# Patient Record
Sex: FEMALE | Race: WHITE | HISPANIC OR LATINO | Employment: OTHER | URBAN - METROPOLITAN AREA
[De-identification: names, ages, dates, MRNs, and addresses within clinical notes are randomized per-mention and may not be internally consistent; named-entity substitution may affect disease eponyms.]

---

## 2017-02-24 ENCOUNTER — TELEPHONE (OUTPATIENT)
Dept: NEUROLOGY | Facility: CLINIC | Age: 47
End: 2017-02-24

## 2017-02-24 NOTE — TELEPHONE ENCOUNTER
----- Message from Jw Prescott sent at 2/24/2017  8:23 AM CST -----  Contact: Pt  Pt would like call back from nurse    Pt states she has movement issues and would like to establish w/ Dr. Edwards.    Pt can be reached at [phone]

## 2017-03-14 ENCOUNTER — PATIENT MESSAGE (OUTPATIENT)
Dept: ENDOCRINOLOGY | Facility: CLINIC | Age: 47
End: 2017-03-14

## 2017-03-14 DIAGNOSIS — E03.9 HYPOTHYROIDISM, UNSPECIFIED TYPE: Primary | ICD-10-CM

## 2017-03-15 ENCOUNTER — PATIENT MESSAGE (OUTPATIENT)
Dept: ENDOCRINOLOGY | Facility: CLINIC | Age: 47
End: 2017-03-15

## 2017-04-08 ENCOUNTER — PATIENT MESSAGE (OUTPATIENT)
Dept: ENDOCRINOLOGY | Facility: CLINIC | Age: 47
End: 2017-04-08

## 2017-04-11 DIAGNOSIS — E07.9 THYROID DISEASE: ICD-10-CM

## 2017-04-11 RX ORDER — LEVOTHYROXINE SODIUM 88 UG/1
88 TABLET ORAL
Qty: 30 TABLET | Refills: 6 | Status: SHIPPED | OUTPATIENT
Start: 2017-04-11 | End: 2017-04-25 | Stop reason: SDUPTHER

## 2017-04-23 ENCOUNTER — PATIENT MESSAGE (OUTPATIENT)
Dept: NEUROLOGY | Facility: CLINIC | Age: 47
End: 2017-04-23

## 2017-04-23 ENCOUNTER — PATIENT MESSAGE (OUTPATIENT)
Dept: ENDOCRINOLOGY | Facility: CLINIC | Age: 47
End: 2017-04-23

## 2017-04-25 ENCOUNTER — OFFICE VISIT (OUTPATIENT)
Dept: SURGERY | Facility: CLINIC | Age: 47
End: 2017-04-25
Payer: COMMERCIAL

## 2017-04-25 ENCOUNTER — PATIENT MESSAGE (OUTPATIENT)
Dept: SURGERY | Facility: CLINIC | Age: 47
End: 2017-04-25

## 2017-04-25 VITALS
SYSTOLIC BLOOD PRESSURE: 119 MMHG | WEIGHT: 130.5 LBS | DIASTOLIC BLOOD PRESSURE: 65 MMHG | HEIGHT: 63 IN | BODY MASS INDEX: 23.12 KG/M2 | HEART RATE: 87 BPM

## 2017-04-25 DIAGNOSIS — K64.9 HEMORRHOIDS, UNSPECIFIED HEMORRHOID TYPE: Primary | ICD-10-CM

## 2017-04-25 DIAGNOSIS — E07.9 THYROID DISEASE: ICD-10-CM

## 2017-04-25 PROCEDURE — 99203 OFFICE O/P NEW LOW 30 MIN: CPT | Mod: 25,S$GLB,, | Performed by: COLON & RECTAL SURGERY

## 2017-04-25 PROCEDURE — 1160F RVW MEDS BY RX/DR IN RCRD: CPT | Mod: S$GLB,,, | Performed by: COLON & RECTAL SURGERY

## 2017-04-25 PROCEDURE — 46600 DIAGNOSTIC ANOSCOPY SPX: CPT | Mod: S$GLB,,, | Performed by: COLON & RECTAL SURGERY

## 2017-04-25 PROCEDURE — 99999 PR PBB SHADOW E&M-EST. PATIENT-LVL III: CPT | Mod: PBBFAC,,, | Performed by: COLON & RECTAL SURGERY

## 2017-04-25 RX ORDER — LEVOTHYROXINE SODIUM 88 UG/1
88 TABLET ORAL
Qty: 90 TABLET | Refills: 3 | Status: SHIPPED | OUTPATIENT
Start: 2017-04-25 | End: 2018-03-02

## 2017-04-25 RX ORDER — LIOTHYRONINE SODIUM 5 UG/1
5 TABLET ORAL 2 TIMES DAILY
Refills: 3 | COMMUNITY
Start: 2017-03-25 | End: 2017-05-09 | Stop reason: SDUPTHER

## 2017-04-25 NOTE — TELEPHONE ENCOUNTER
Pt requested rx for synthroid be printed an faxed to a specific pharmacy not on file,  Fax number provided

## 2017-04-25 NOTE — PROGRESS NOTES
"Subjective:       Patient ID: Ntahalie Rodriguez is a 46 y.o. female.    Chief Complaint: No chief complaint on file.    HPI   46 F who presents to clinic with complaints of hemorrhoids for several years. She has seen Dr. Sharpe in 2012 who performed a RBL. Once a month her hemorrhoids "flare up", she has rectal pain for 2-3 days then subsides. She takes benefiber daily. She uses preperation H during the flares. Has a soft BM daily. No blood in stool. No previous colonoscopy. No family histroy of CRC.  Review of Systems   Constitutional: Negative for fatigue, fever and unexpected weight change.   Respiratory: Negative for shortness of breath.    Cardiovascular: Negative for chest pain.   Gastrointestinal: Positive for rectal pain. Negative for abdominal distention, abdominal pain, anal bleeding, blood in stool, constipation, diarrhea, nausea and vomiting.       Objective:      Physical Exam   Constitutional: She is oriented to person, place, and time. She appears well-developed and well-nourished. No distress.   Eyes: Conjunctivae and EOM are normal.   Pulmonary/Chest: Effort normal. No respiratory distress.   Abdominal: Soft. She exhibits no distension. There is no tenderness.   Genitourinary:   Genitourinary Comments: Resolving thrombosed internal hemorrhoids    Musculoskeletal: Normal range of motion.   Neurological: She is alert and oriented to person, place, and time.   Skin: Skin is warm and dry.   Psychiatric: She has a normal mood and affect. Her behavior is normal.       Assessment:       1. External hemorrhoid        Plan:       Increase fiber and water intake  Tucks pads PRN  Discussed hemorrhoidectomy  RTC if desires surgical intervention   Have seen and examined the patient with the fellow and agree with their plan.  BARB GOODSON    "

## 2017-04-26 ENCOUNTER — PATIENT MESSAGE (OUTPATIENT)
Dept: ENDOCRINOLOGY | Facility: CLINIC | Age: 47
End: 2017-04-26

## 2017-05-09 ENCOUNTER — PATIENT MESSAGE (OUTPATIENT)
Dept: ENDOCRINOLOGY | Facility: CLINIC | Age: 47
End: 2017-05-09

## 2017-05-09 RX ORDER — LIOTHYRONINE SODIUM 5 UG/1
5 TABLET ORAL 2 TIMES DAILY
Qty: 60 TABLET | Refills: 3 | Status: SHIPPED | OUTPATIENT
Start: 2017-05-09 | End: 2017-09-04 | Stop reason: SDUPTHER

## 2017-05-11 ENCOUNTER — TELEPHONE (OUTPATIENT)
Dept: SURGERY | Facility: CLINIC | Age: 47
End: 2017-05-11

## 2017-05-11 NOTE — TELEPHONE ENCOUNTER
----- Message from Gissel Madsen sent at 5/2/2017  8:33 AM CDT -----  Contact: Pt:[phone]  Pt states she saw  in clinic and she has some questions and concerns and would like to speak with the doctor or his nurse.

## 2017-05-12 ENCOUNTER — OFFICE VISIT (OUTPATIENT)
Dept: ENDOCRINOLOGY | Facility: CLINIC | Age: 47
End: 2017-05-12
Payer: COMMERCIAL

## 2017-05-12 VITALS
WEIGHT: 129.19 LBS | SYSTOLIC BLOOD PRESSURE: 110 MMHG | BODY MASS INDEX: 22.89 KG/M2 | HEIGHT: 63 IN | DIASTOLIC BLOOD PRESSURE: 70 MMHG | HEART RATE: 88 BPM

## 2017-05-12 DIAGNOSIS — E03.9 HYPOTHYROIDISM, UNSPECIFIED TYPE: Primary | ICD-10-CM

## 2017-05-12 PROCEDURE — 99213 OFFICE O/P EST LOW 20 MIN: CPT | Mod: S$GLB,,, | Performed by: INTERNAL MEDICINE

## 2017-05-12 PROCEDURE — 1160F RVW MEDS BY RX/DR IN RCRD: CPT | Mod: S$GLB,,, | Performed by: INTERNAL MEDICINE

## 2017-05-12 PROCEDURE — 99999 PR PBB SHADOW E&M-EST. PATIENT-LVL III: CPT | Mod: PBBFAC,,, | Performed by: INTERNAL MEDICINE

## 2017-05-12 NOTE — MR AVS SNAPSHOT
[facility]  [address]  [place] [address]  Phone: [phone]  Fax: [phone]                  Nathalie Rodriguez   2017 8:30 AM   Office Visit    Description:  Female : 1970   Provider:  Yousif Gibbons DO   Department:  [facility]           Reason for Visit     Hypothyroidism           Diagnoses this Visit        Comments    Hypothyroidism, unspecified type    -  Primary            To Do List           Future Appointments        Provider Department Dept Phone    5/15/2017 3:20 PM Krish Edwards MD [facility] [phone]      Goals (5 Years of Data)     None      [facility]     [facility] On Call Nurse Care Line -  Assistance  Unless otherwise directed by your provider, please contact [facility], our nurse care line that is available for  assistance.     Registered nurses in the [facility] provide: appointment scheduling, clinical advisement, health education, and other advisory services.  Call: [phone] (toll free)               Medications           Message regarding Medications     Verify the changes and/or additions to your medication regime listed below are the same as discussed with your clinician today.  If any of these changes or additions are incorrect, please notify your healthcare provider.        STOP taking these medications     fluticasone (FLONASE) 50 mcg/actuation nasal spray 1 spray by Each Nare route 2 (two) times daily as needed for Rhinitis.    PROCTOSOL HC 2.5 % rectal cream APPLY TO ANAL AREA TWICE DAILY           Verify that the below list of medications is an accurate representation of the medications you are currently taking.  If none reported, the list may be blank. If incorrect, please contact your healthcare provider. Carry this list with you in case of emergency.           Current Medications     cyclobenzaprine (FLEXERIL) 10 MG tablet Tab 1 po at HS prn muscle spasms.  Warning  "of drowsiness.    levothyroxine (SYNTHROID) 88 MCG tablet Take 1 tablet (88 mcg total) by mouth before breakfast.    liothyronine (CYTOMEL) 5 MCG Tab Take 1 tablet (5 mcg total) by mouth 2 (two) times daily.    mecobal-levomefolat Ca-B6 phos 3-35-2 mg Tab Take one tablet by mouth one time daily    meloxicam (MOBIC) 15 MG tablet TAKE 1 TABLET BY MOUTH ONCE DAILY AS NEEDED FOR KNEE PAIN    VITAMIN D2 50,000 unit capsule 50,000 Units every 30 days.            Clinical Reference Information           Your Vitals Were     BP Pulse Height Weight Last Period BMI    110/70 (BP Location: Left arm, Patient Position: Sitting, BP Method: Manual) 88 5' 3" (1.6 m) 58.6 kg (129 lb 3 oz) 05/04/2017 (Exact Date) 22.88 kg/m2      Blood Pressure          Most Recent Value    BP  110/70      Allergies as of 5/12/2017     Codeine    Hydrocodone    Sulfa (Sulfonamide Antibiotics)      Immunizations Administered on Date of Encounter - 5/12/2017     None      Orders Placed During Today's Visit     Future Labs/Procedures Expected by Expires    T3, free  5/12/2017 5/12/2018    T4, free  5/12/2017 5/12/2018    TSH  5/12/2017 5/12/2018      Language Assistance Services     ATTENTION: Language assistance services are available, free of charge. Please call [phone].      ATENCIÓN: Si habla español, tiene a tubbs disposición servicios gratuitos de asistencia lingüística. Llame al [phone].     CHÚ Ý: N?u b?n nói Ti?ng Vi?t, có các d?ch v? h? tr? ngôn ng? mi?n phí dành cho b?n. G?i s? [phone].         [facility] complies with applicable Federal civil rights laws and does not discriminate on the basis of race, color, national origin, age, disability, or sex.        "

## 2017-05-12 NOTE — PROGRESS NOTES
CHIEF COMPLAINT: Hypothyroid  46 year old being seen as a f/u. Hypothyroidism x 25 years. On synthroid 88 mcg and cytomel 5 twice a day. No Palpitations. No fatigue. No diarrhea or constipation.                PAST MEDICAL HISTORY/PAST SURGICAL HISTORY:  Reviewed in EPIC    SOCIAL HISTORY: No Tobacco. Social alcohol.     FAMILY HISTORY:  Sister hypothyroid. No DM 2. Parkinsons.     MEDICATIONS/ALLERGIES: The patient's MedCard has been updated and reviewed.      ROS:   Constitutional: No recent significant weight change  Eyes: No recent visual changes  ENT: No dysphagia  Cardiovascular: Denies current anginal symptoms  Respiratory: Denies current respiratory difficulty  Gastrointestinal: No N/V  GenitoUrinary - No dysuria  Skin: No new skin rash  Neurologic: No focal neurologic complaints  Remainder ROS negative        PE:    GENERAL: Well developed, well nourished.  NECK: Supple, trachea midline, No palpable thyroid nodules  CHEST: Resp even and unlabored, CTA bilateral.  CARDIAC: RRR, S1, S2 heard, no murmurs, rubs, S3, or S4    Labs scanned in Media (from April)        ASSESSMENT/PLAN:  1. Hypothyroidism- TSH WNL. Continue current Tx.     FOLLOWUP  F/U 1 year- TSH, Ft4, FT3

## 2017-05-15 ENCOUNTER — OFFICE VISIT (OUTPATIENT)
Dept: NEUROLOGY | Facility: CLINIC | Age: 47
End: 2017-05-15
Payer: COMMERCIAL

## 2017-05-15 ENCOUNTER — PATIENT MESSAGE (OUTPATIENT)
Dept: NEUROLOGY | Facility: CLINIC | Age: 47
End: 2017-05-15

## 2017-05-15 VITALS
BODY MASS INDEX: 23.01 KG/M2 | WEIGHT: 129.88 LBS | HEIGHT: 63 IN | HEART RATE: 84 BPM | SYSTOLIC BLOOD PRESSURE: 111 MMHG | DIASTOLIC BLOOD PRESSURE: 74 MMHG

## 2017-05-15 DIAGNOSIS — G20.C PARKINSONISM, UNSPECIFIED PARKINSONISM TYPE: Primary | ICD-10-CM

## 2017-05-15 DIAGNOSIS — R25.1 TREMOR: ICD-10-CM

## 2017-05-15 DIAGNOSIS — G24.8 HEMIDYSTONIA: ICD-10-CM

## 2017-05-15 PROCEDURE — 99999 PR PBB SHADOW E&M-EST. PATIENT-LVL III: CPT | Mod: PBBFAC,,, | Performed by: PSYCHIATRY & NEUROLOGY

## 2017-05-15 PROCEDURE — 99214 OFFICE O/P EST MOD 30 MIN: CPT | Mod: S$GLB,,, | Performed by: PSYCHIATRY & NEUROLOGY

## 2017-05-15 RX ORDER — CARBIDOPA AND LEVODOPA 25; 100 MG/1; MG/1
TABLET ORAL
Qty: 20 TABLET | Refills: 0 | Status: SHIPPED | OUTPATIENT
Start: 2017-05-15 | End: 2017-06-20 | Stop reason: SDUPTHER

## 2017-05-15 NOTE — ASSESSMENT & PLAN NOTE
Young onset PD vs hemidystonia vs Dopar esponsive dystonia     - Check ceruloplasmin, 24 hour urine copper   - Levodopa trial

## 2017-05-15 NOTE — ASSESSMENT & PLAN NOTE
Tremor + Hemidystonia vs tremor as a part of dopa responsive dystonia or young onset Parkinson's     - Levodopa trial  - Urine copper and serum ceruloplasmin   - F/U in 6-8 weeks

## 2017-05-15 NOTE — PROGRESS NOTES
Name: Nathalie Rodriguez  MRN: [id number]   CSN: [id number]      Date: 05/15/2017    Referring physician:  Aaareferral Self  No address on file    Chief Complaint / Interval History: Consult  Consult tremor    History of Present Illness (HPI):  RH female of mixed southern  and mediterranean descent. Pt has had a left hand tremor for about a year and a half now. Her father had PD, dx in 60s and passed in his 70s. She has seen Dr. Norman as well on the [facility]. She has also had a strange feeling in her left leg. It always feels tense, like she has to consciously relax it. No decrease in mobility or change in strength. She thinks this has not gotten any worse in the last few months but she gets a feeling as if her toes want to curl down as well. Tremor in the left hand can sometimes involve the arm, she feels like she has lost some strength in the left hand too. Tremor increases with stress. Not present at rest, only present during action. No feeling of pulling or twisting in the left hand or arm. Sometimes the left eye twitches. Has known bony abnormality of the neck that she reports as well.      Nonmotor/Premotor ROS:  Hyposmia (HENT)?No  RBD/sleep issues (Constitutional)?No  Depression/anxiety (Psychiatric)?some anxiety, has not required medications  Fatigue (Constitutional)?No  Constipation (GI)?intermittent, takes fiber and water   Urinary issues ()?No  Sexual dysfunction ()?No  Orthostasis (Cardiovascular)?No  Leg swelling (Cardiovascular)? No  Falls (Musculoskeletal)?No  Cognitive impairment (Neurologic)?No  Psychoses (Psychiatric)?No  Pain/Paresthesia (Neurologic)?chronic neck pain   Visual changes (Eyes)?No  Moles / skin changes (Skin)?No  Stridor / SOB (Pulm)?No  Bruising (Heme)?No    Past Medical History: The patient  has a past medical history of Allergy; Hypothyroidism; and Sleep apnea. Sleep apnea was related to pregnancy - had surgical correction     Social History: The patient  reports that  "she has never smoked. She has never used smokeless tobacco. She reports that she drinks alcohol. She reports that she does not use illicit drugs.    Family History: Their family history includes Cancer in her maternal aunt; Parkinsonism in her father.    No other family members with neurologic disorders     Allergies: Codeine; Hydrocodone; and Sulfa (sulfonamide antibiotics)     Meds:   Current Outpatient Prescriptions on File Prior to Visit   Medication Sig Dispense Refill    cyclobenzaprine (FLEXERIL) 10 MG tablet Tab 1 po at HS prn muscle spasms.  Warning of drowsiness. 30 tablet 1    levothyroxine (SYNTHROID) 88 MCG tablet Take 1 tablet (88 mcg total) by mouth before breakfast. 90 tablet 3    liothyronine (CYTOMEL) 5 MCG Tab Take 1 tablet (5 mcg total) by mouth 2 (two) times daily. 60 tablet 3    mecobal-levomefolat Ca-B6 phos 3-35-2 mg Tab Take one tablet by mouth one time daily 90 tablet 4    meloxicam (MOBIC) 15 MG tablet TAKE 1 TABLET BY MOUTH ONCE DAILY AS NEEDED FOR KNEE PAIN 90 tablet 0    VITAMIN D2 50,000 unit capsule 50,000 Units every 30 days.        No current facility-administered medications on file prior to visit.        Exam:  /74 (BP Location: Right arm, Patient Position: Sitting, BP Method: Automatic)  Pulse 84  Ht 5' 3" (1.6 m)  Wt 58.9 kg (129 lb 13.6 oz)  LMP 05/04/2017 (Exact Date)  BMI 23 kg/m2    Constitutional  Well-developed, well-nourished, appears stated age   HEENT  NC/AT, MMM, normal pharynx    Neurological    * Mental status      - Orientation  Oriented to person, place, time, and situation     - Memory   Intact recent and remote     - Attention/concentration  Attentive, vigilant during exam     - Language  Naming & repetition intact     - Fund of knowledge  Aware of current events     - Executive  Well-organized thoughts     - Other     * Cranial nerves       - CN II  PERRL, visual fields full to confrontation     - CN III, IV, VI  Extraocular movements full, " normal pursuits and saccades     - CN V  Sensation V1 - V3 intact     - CN VII  Face strong and symmetric bilaterally     - CN VIII  Hearing intact bilaterally     - CN IX, X  Palate raises midline and symmetric     - CN XI  SCM and trapezius 5/5 bilaterally     - CN XII  Tongue midline   * Motor  Muscle bulk normal, strength 5/5 throughout   * Sensory   Intact to temperature and vibration throughout   * Coordination  No dysmetria with finger-to-nose or heel-to-shin   * Gait  See below.   * Deep tendon reflexes  2+ R biceps, triceps, aiden, knee and ankle jerk 3+ L biceps, triceps, aiden, knee and ankle jerk   Hoffmans negative bilaterally    Babinski downgoing bilaterally   * Specialized movement exam  No hypophonic speech.    No facial masking.   No cogwheel rigidity.     Mild bradykinesia on hand open/close, finger taps, pronation/supination on the left    High frequency, low amplitude tremor of the L>R hand on posturing and distraction    No other visual dystonia, chorea, athetosis, myoclonus, or tics.   No motor impersistence.   Normal-based gait.   No shortened stride length.   Reduced arm swing on the left    No postural instability.      Laboratory/Radiological:  - Results:No visits with results within 3 Month(s) from this visit.  Latest known visit with results is:    Lab Visit on 12/09/2016   Component Date Value Ref Range Status    TSH 12/09/2016 0.111* 0.400 - 4.000 uIU/mL Final    Free T4 12/09/2016 1.11  0.71 - 1.51 ng/dL Final       - Independent review of images:    Diagnoses:          1. Hemidystonia  Copper, urine, 24 hour    CERULOPLASMIN    CERULOPLASMIN   2. Tremor     3. Parkinsonism, unspecified Parkinsonism type         Medical Decision Making:    Problem List Items Addressed This Visit        Neuro    Hemidystonia    Overview     Feeling of pulling/tightness and toes curling mostly at night          Current Assessment & Plan     Young onset PD vs hemidystonia vs Dopar esponsive dystonia      - Check ceruloplasmin, 24 hour urine copper   - Levodopa trial          Relevant Orders    Copper, urine, 24 hour    CERULOPLASMIN    Parkinsonism - Primary    Overview     Bradykinesia and resting tremor when distracted          Current Assessment & Plan     Father with PD    - Levodopa trial as above  - Consider future CRIS scan             Other    Tremor    Overview     High frequency, low amplitude          Current Assessment & Plan     Tremor + Hemidystonia vs tremor as a part of dopa responsive dystonia or young onset Parkinson's     - Levodopa trial  - Urine copper and serum ceruloplasmin   - F/U in 6-8 weeks                Alok Sarabia MD  Movement Disorders Fellow  [facility]     ==========================  Patient seen and examined.  I agree with the history, exam, assessment and plan within the fellow's note as stated above.  Note has been edited by me to reflect my work and changes.    Krish Edwards MD, MPH  Division of Movement and Memory Disorders  [facility]

## 2017-05-16 ENCOUNTER — PATIENT MESSAGE (OUTPATIENT)
Dept: NEUROLOGY | Facility: CLINIC | Age: 47
End: 2017-05-16

## 2017-05-18 ENCOUNTER — PATIENT MESSAGE (OUTPATIENT)
Dept: NEUROLOGY | Facility: CLINIC | Age: 47
End: 2017-05-18

## 2017-05-23 ENCOUNTER — PATIENT MESSAGE (OUTPATIENT)
Dept: SURGERY | Facility: CLINIC | Age: 47
End: 2017-05-23

## 2017-05-24 ENCOUNTER — TELEPHONE (OUTPATIENT)
Dept: SURGERY | Facility: CLINIC | Age: 47
End: 2017-05-24

## 2017-05-24 NOTE — TELEPHONE ENCOUNTER
Spoke with patient. Requests to speak with Dr. Sebastian concerning other surgical options other than Hemorrhoidectomy surgery. Discussed clinic options but states she has found alternative surgeries on the Internet and wants his options of them. Message to Dr. Sebastian.

## 2017-05-29 ENCOUNTER — PATIENT MESSAGE (OUTPATIENT)
Dept: NEUROLOGY | Facility: CLINIC | Age: 47
End: 2017-05-29

## 2017-05-29 DIAGNOSIS — R25.1 TREMOR: Primary | ICD-10-CM

## 2017-05-31 ENCOUNTER — PATIENT MESSAGE (OUTPATIENT)
Dept: NEUROLOGY | Facility: CLINIC | Age: 47
End: 2017-05-31

## 2017-06-02 ENCOUNTER — PATIENT MESSAGE (OUTPATIENT)
Dept: NEUROLOGY | Facility: CLINIC | Age: 47
End: 2017-06-02

## 2017-06-08 ENCOUNTER — PATIENT MESSAGE (OUTPATIENT)
Dept: NEUROLOGY | Facility: CLINIC | Age: 47
End: 2017-06-08

## 2017-06-08 ENCOUNTER — TELEPHONE (OUTPATIENT)
Dept: NEUROLOGY | Facility: CLINIC | Age: 47
End: 2017-06-08

## 2017-06-08 NOTE — TELEPHONE ENCOUNTER
Returned call to pt and she wants and update on her [employer] message sent to provider. She will await for an answer from Dr. Edwards

## 2017-06-08 NOTE — TELEPHONE ENCOUNTER
----- Message from Laila Geiger sent at 6/8/2017 10:14 AM CDT -----  Contact: Patient [phone]  Patient is calling to speak with nurse about some bad side effects that she is having to her medication carbidopa-levodopa  mg (SINEMET) 25-. Please call

## 2017-06-09 ENCOUNTER — PATIENT MESSAGE (OUTPATIENT)
Dept: NEUROLOGY | Facility: CLINIC | Age: 47
End: 2017-06-09

## 2017-06-12 ENCOUNTER — PATIENT MESSAGE (OUTPATIENT)
Dept: NEUROLOGY | Facility: CLINIC | Age: 47
End: 2017-06-12

## 2017-06-14 ENCOUNTER — OFFICE VISIT (OUTPATIENT)
Dept: NEUROLOGY | Facility: CLINIC | Age: 47
End: 2017-06-14
Payer: COMMERCIAL

## 2017-06-14 VITALS
HEART RATE: 98 BPM | WEIGHT: 126.13 LBS | HEIGHT: 63 IN | BODY MASS INDEX: 22.35 KG/M2 | SYSTOLIC BLOOD PRESSURE: 134 MMHG | DIASTOLIC BLOOD PRESSURE: 82 MMHG

## 2017-06-14 DIAGNOSIS — G47.00 INSOMNIA, UNSPECIFIED TYPE: ICD-10-CM

## 2017-06-14 DIAGNOSIS — R25.1 TREMOR: ICD-10-CM

## 2017-06-14 DIAGNOSIS — G20.C PARKINSONISM, UNSPECIFIED PARKINSONISM TYPE: ICD-10-CM

## 2017-06-14 PROCEDURE — 99999 PR PBB SHADOW E&M-EST. PATIENT-LVL III: CPT | Mod: PBBFAC,,, | Performed by: PSYCHIATRY & NEUROLOGY

## 2017-06-14 PROCEDURE — 99213 OFFICE O/P EST LOW 20 MIN: CPT | Mod: S$GLB,,, | Performed by: PSYCHIATRY & NEUROLOGY

## 2017-06-14 NOTE — ASSESSMENT & PLAN NOTE
Slightly worse on exam today. Discussed multiple treatment options including propranolol, artane. Management of tremor somewhat clouded by degree of anxiety and reported lack of sleep as these factors could be contributing to worsening physiologic tremor.     - Encouraged levodopa trial for now but cautioned that this is not likely to help her anxiety or sleep  - Offered to test metanephrines today, pt and family declined  - Referred to psychiatry on the [facility]   - To have ophtho eval tomorrow to examine for KF rings

## 2017-06-14 NOTE — ASSESSMENT & PLAN NOTE
- Psychiatry referral, pt requests to be seen on the [facility], message left with demographics on [facility] answering system.

## 2017-06-14 NOTE — PROGRESS NOTES
Name: Nathalie Rodriguez  MRN: [id number]   CSN: [id number]      Date: 06/14/2017    Referring physician:  No referring provider defined for this encounter.    Chief Complaint / Interval History:   Patient states that she had insomnia about a week after he saw us. Started on Lexapro and Xannax by primary care doctor. Anxiety has been very up and down.     Patient has not been sleeping. She has not been sleeping. Tremors have been worse since last visit. She is not sure if anxiety is making things worse.     Has not started CD/LD yet     She is also loosing weight.     Tightness in leg is worse.     She is very concerned about not sleeping. Has been about two weeks now.       History of Present Illness (HPI):  RH female of mixed southern  and mediterranean descent. Pt has had a left hand tremor for about a year and a half now. Her father had PD, dx in 60s and passed in his 70s. She has seen Dr. Norman as well on the [facility]. She has also had a strange feeling in her left leg. It always feels tense, like she has to consciously relax it. No decrease in mobility or change in strength. She thinks this has not gotten any worse in the last few months but she gets a feeling as if her toes want to curl down as well. Tremor in the left hand can sometimes involve the arm, she feels like she has lost some strength in the left hand too. Tremor increases with stress. Not present at rest, only present during action. No feeling of pulling or twisting in the left hand or arm. Sometimes the left eye twitches. Has known bony abnormality of the neck that she reports as well.        Past Medical History: The patient  has a past medical history of Allergy; Hypothyroidism; and Sleep apnea. Sleep apnea was related to pregnancy - had surgical correction     Social History: The patient  reports that she has never smoked. She has never used smokeless tobacco. She reports that she drinks alcohol. She reports that she does not use  "drugs.    Family History: Their family history includes Cancer in her maternal aunt; Parkinsonism in her father.    No other family members with neurologic disorders     Allergies: Codeine; Hydrocodone; and Sulfa (sulfonamide antibiotics)     Meds:   Current Outpatient Prescriptions on File Prior to Visit   Medication Sig Dispense Refill    alprazolam (XANAX) 0.5 MG tablet Tab 1/2 to 1 po at HS prn anxiety/insomnia. 30 tablet 0    carbidopa-levodopa  mg (SINEMET)  mg per tablet Take 1/2 - 1 tab twice per day 20 tablet 0    cyclobenzaprine (FLEXERIL) 10 MG tablet Tab 1 po at HS prn muscle spasms.  Warning of drowsiness. 30 tablet 1    escitalopram oxalate (LEXAPRO) 20 MG tablet Take 1 tablet (20 mg total) by mouth once daily. 30 tablet 0    levothyroxine (SYNTHROID) 88 MCG tablet Take 1 tablet (88 mcg total) by mouth before breakfast. 90 tablet 3    liothyronine (CYTOMEL) 5 MCG Tab Take 1 tablet (5 mcg total) by mouth 2 (two) times daily. 60 tablet 3    mecobal-levomefolat Ca-B6 phos 3-35-2 mg Tab Take one tablet by mouth one time daily 90 tablet 4    meloxicam (MOBIC) 15 MG tablet TAKE 1 TABLET BY MOUTH ONCE DAILY AS NEEDED FOR KNEE PAIN 90 tablet 0    trazodone (DESYREL) 50 MG tablet Tab 1 po at HS as needed for insomnia. 30 tablet 1    VITAMIN D2 50,000 unit capsule 50,000 Units every 30 days.        No current facility-administered medications on file prior to visit.        Exam:  /82   Pulse 98   Ht 5' 3" (1.6 m)   Wt 57.2 kg (126 lb 1.7 oz)   BMI 22.34 kg/m²     Constitutional  Well-developed, well-nourished, appears stated age   HEENT  NC/AT, MMM, normal pharynx    * Deep tendon reflexes  2+ R biceps, triceps, aiden, knee and ankle jerk 3+ L biceps, triceps, aiden, knee and ankle jerk    * Specialized movement exam  No hypophonic speech.    No facial masking.   No cogwheel rigidity.     Mild bradykinesia on hand open/close, finger taps, pronation/supination on the left    High " frequency, low amplitude tremor of the L>R hand on posturing and distraction at rest  Left foot is slightly intorted    No other visual dystonia, chorea, athetosis, myoclonus, or tics.   No motor impersistence.     Laboratory/Radiological:  - Results:  No visits with results within 3 Month(s) from this visit.   Latest known visit with results is:   Lab Visit on 12/09/2016   Component Date Value Ref Range Status    TSH 12/09/2016 0.111* 0.400 - 4.000 uIU/mL Final    Free T4 12/09/2016 1.11  0.71 - 1.51 ng/dL Final       - Independent review of images:    Medical Decision Making:    Problem List Items Addressed This Visit        Neuro    Parkinsonism    Overview     Bradykinesia and resting tremor when distracted     - Levodopa trial as above. Consider future CRIS scan          Insomnia    Overview     Managed by PCP currently, refractory to Ambien, trazodone, Xanax          Current Assessment & Plan     - Psychiatry referral, pt requests to be seen on the [facility], message left with demographics on [facility] answering system.          Relevant Orders    Ambulatory consult to Psychiatry       Other    Tremor    Overview     High frequency, low amplitude          Current Assessment & Plan     Slightly worse on exam today. Discussed multiple treatment options including propranolol, artane. Management of tremor somewhat clouded by degree of anxiety and reported lack of sleep as these factors could be contributing to worsening physiologic tremor.     - Encouraged levodopa trial for now but cautioned that this is not likely to help her anxiety or sleep  - Offered to test metanephrines today, pt and family declined  - Referred to psychiatry on the [place]   - To have ophtho eval tomorrow to examine for KF rings            Other Visit Diagnoses    None.       - F/ U in three weeks for med trial results    Alok Sarabia MD  Movement Disorders Fellow  [facility]

## 2017-06-16 ENCOUNTER — PATIENT MESSAGE (OUTPATIENT)
Dept: NEUROLOGY | Facility: CLINIC | Age: 47
End: 2017-06-16

## 2017-06-20 ENCOUNTER — PATIENT MESSAGE (OUTPATIENT)
Dept: NEUROLOGY | Facility: CLINIC | Age: 47
End: 2017-06-20

## 2017-06-20 RX ORDER — CARBIDOPA AND LEVODOPA 25; 100 MG/1; MG/1
TABLET ORAL
Qty: 60 TABLET | Refills: 0 | Status: SHIPPED | OUTPATIENT
Start: 2017-06-20 | End: 2018-04-26

## 2017-07-05 ENCOUNTER — OFFICE VISIT (OUTPATIENT)
Dept: NEUROLOGY | Facility: CLINIC | Age: 47
End: 2017-07-05
Payer: COMMERCIAL

## 2017-07-05 VITALS
HEIGHT: 63 IN | HEART RATE: 78 BPM | SYSTOLIC BLOOD PRESSURE: 103 MMHG | BODY MASS INDEX: 22.27 KG/M2 | DIASTOLIC BLOOD PRESSURE: 65 MMHG | WEIGHT: 125.69 LBS

## 2017-07-05 DIAGNOSIS — G20.C PARKINSONISM, UNSPECIFIED PARKINSONISM TYPE: Primary | ICD-10-CM

## 2017-07-05 DIAGNOSIS — G24.8 HEMIDYSTONIA: ICD-10-CM

## 2017-07-05 PROCEDURE — 99999 PR PBB SHADOW E&M-EST. PATIENT-LVL III: CPT | Mod: PBBFAC,,, | Performed by: PSYCHIATRY & NEUROLOGY

## 2017-07-05 PROCEDURE — 99213 OFFICE O/P EST LOW 20 MIN: CPT | Mod: S$GLB,,, | Performed by: PSYCHIATRY & NEUROLOGY

## 2017-07-05 NOTE — LETTER
July 5, 2017      Osito Sykes  [address]  [place] [address]           [facility]  [address]  [facility] [address]  Phone: [phone]  Fax: [phone]          Patient: Nathalie Rodriguez   MR Number: [id number]   YOB: 1970   Date of Visit: 7/5/2017       Dear Osito Sykes:    Thank you for referring Nathalie Rodriguez to me for evaluation. Attached you will find relevant portions of my assessment and plan of care.    If you have questions, please do not hesitate to call me. I look forward to following Nathalie Rodriguez along with you.    Sincerely,    Alok Sarabia MD    Enclosure  CC:  No Recipients    If you would like to receive this communication electronically, please contact [email or web address] or [phone] to request more information on [facility] access.    For providers and/or their staff who would like to refer a patient to [facility], please contact us through our one-stop-shop provider referral line, [facility], at [phone].    If you feel you have received this communication in error or would no longer like to receive these types of communications, please e-mail [email or web address]

## 2017-07-06 ENCOUNTER — PATIENT MESSAGE (OUTPATIENT)
Dept: NEUROLOGY | Facility: CLINIC | Age: 47
End: 2017-07-06

## 2017-07-06 DIAGNOSIS — G24.8 HEMIDYSTONIA: ICD-10-CM

## 2017-07-06 DIAGNOSIS — R25.1 TREMOR: Primary | ICD-10-CM

## 2017-07-06 DIAGNOSIS — G20.C PARKINSONISM, UNSPECIFIED PARKINSONISM TYPE: ICD-10-CM

## 2017-07-07 NOTE — TELEPHONE ENCOUNTER
I have both referrals in but I'm unable to to attach both providers to the referral. So within the comment I put the providers name.

## 2017-07-11 ENCOUNTER — TELEPHONE (OUTPATIENT)
Dept: NEUROLOGY | Facility: CLINIC | Age: 47
End: 2017-07-11

## 2017-07-11 ENCOUNTER — PATIENT MESSAGE (OUTPATIENT)
Dept: NEUROLOGY | Facility: CLINIC | Age: 47
End: 2017-07-11

## 2017-07-11 NOTE — TELEPHONE ENCOUNTER
----- Message from Georgette Rivera sent at 7/10/2017 10:58 AM CDT -----  Contact: self @ [phone]  Pt says she sent a message through the portal last week because she will be going out of town.  Pt says the message has not been addressed yet.  pls call asap.

## 2017-07-11 NOTE — TELEPHONE ENCOUNTER
Nathalie waldron needs two referrals in for neurologist Dr. Ingrid Choe and Dr. Philly Mckeon at [facility].     Dr. Leonardo can you sign pt referrals while DR. Edwards is out.

## 2017-07-11 NOTE — TELEPHONE ENCOUNTER
I'm going to be in [place] in September, and my mother would like me to take the opportunity to consult with a neurologist up there. However, they require a referral from a neurologist. Would you kindly email me or mail me a referral to Dr. Ingrid Choe and Dr. Philly Mckeon at [facility]? Maybe you can somehow leave it on the portal and I can just print it out at home. I was also wondering if the Escitalopram is going to interfere with the daTscan.  Also,  when I'm ready can I just stop taking trazadone or do I have to wean off of it?    Thank you, Nathalie Rodriguez

## 2017-07-11 NOTE — LETTER
July 11, 2017    [facility]             [facility] - Neurology  [address]  [facility] [address]  Phone: [phone]  Fax: [phone]   Patient: Nathalie Rodriguez   MR Number: [id number]   YOB: 1970   Date of Visit: 7/11/2017       To Whom It May Concern:    I am referring my patient, Nathalie Rodriguez, to you for evaluation of parkinsonism. She seeks second opinion at [facility] given planned travel to [place].    Last clinic visit assessment and plan:    High frequency low amplitude tremor present at rest and on posturing with dystonia and parkinsonism     - Obtain CRIS scan  - Stop Levodopa to see if it was having beneficial effect  - Consider Artane as next add on (already on trazodone and lexapro)   - Pt to speak to PCP about reducing lexapro  - Counseled patient on diet and exercise      Alok Sarabia MD  Movement Disorders Fellow  [facility]      Patient seen and examined.  I agree with the history, exam, assessment and plan within the fellow's note as stated above.  Note has been edited by me to reflect my work and changes.     Krish Edwards MD, MPH  Division of Movement and Memory Disorders  [facility]      I appreciate your assistance in her care and look forward to your findings and recommendations.    Sincerely,                      Krish Edwards MD

## 2017-07-14 ENCOUNTER — TELEPHONE (OUTPATIENT)
Dept: NEUROLOGY | Facility: CLINIC | Age: 47
End: 2017-07-14

## 2017-07-14 NOTE — TELEPHONE ENCOUNTER
----- Message from Martha Veliz sent at 7/13/2017  9:16 AM CDT -----  Contact: pt [phone]  Pt is calling to get her orders for [facility] ct [facility] [place].pls call

## 2017-07-14 NOTE — TELEPHONE ENCOUNTER
Iona, please tell the patient that it is pending review.  That information does not need to come from me.

## 2017-07-14 NOTE — TELEPHONE ENCOUNTER
Called pt and she stated that the order is taking to long do the brain imaging and that she willing to pay out of pocket, she mentioned that the center only need the order!

## 2017-07-18 ENCOUNTER — TELEPHONE (OUTPATIENT)
Dept: NEUROLOGY | Facility: CLINIC | Age: 47
End: 2017-07-18

## 2017-07-18 NOTE — TELEPHONE ENCOUNTER
----- Message from Martha Veliz sent at 7/18/2017  9:32 AM CDT -----  Contact: pt [phone]  Pt states she is still waiting on her orders to be sent to [facility].donn call

## 2017-07-18 NOTE — TELEPHONE ENCOUNTER
Called patient and informed test is not approve by her insurance. She states she will pay for test out-of-pocket and would still like referral sent over to [facility]. Will fax to [facility] per patient's request.

## 2017-07-19 ENCOUNTER — TELEPHONE (OUTPATIENT)
Dept: NEUROLOGY | Facility: CLINIC | Age: 47
End: 2017-07-19

## 2017-07-19 NOTE — TELEPHONE ENCOUNTER
----- Message from Alejandro Herrera sent at 7/19/2017  4:36 PM CDT -----  Contact: Krish with [facility] [phone]  Caller is calling to get the pt's medication list faxed to [phone] AttN: nuclear medicine

## 2017-07-20 ENCOUNTER — PATIENT MESSAGE (OUTPATIENT)
Dept: NEUROLOGY | Facility: CLINIC | Age: 47
End: 2017-07-20

## 2017-07-24 ENCOUNTER — PATIENT MESSAGE (OUTPATIENT)
Dept: NEUROLOGY | Facility: CLINIC | Age: 47
End: 2017-07-24

## 2017-07-25 ENCOUNTER — PATIENT MESSAGE (OUTPATIENT)
Dept: NEUROLOGY | Facility: CLINIC | Age: 47
End: 2017-07-25

## 2017-07-31 ENCOUNTER — PATIENT MESSAGE (OUTPATIENT)
Dept: NEUROLOGY | Facility: CLINIC | Age: 47
End: 2017-07-31

## 2017-08-01 ENCOUNTER — PATIENT MESSAGE (OUTPATIENT)
Dept: NEUROLOGY | Facility: CLINIC | Age: 47
End: 2017-08-01

## 2017-09-04 RX ORDER — LIOTHYRONINE SODIUM 5 UG/1
TABLET ORAL
Qty: 60 TABLET | Refills: 3 | Status: SHIPPED | OUTPATIENT
Start: 2017-09-04 | End: 2018-03-02

## 2017-10-16 ENCOUNTER — PATIENT MESSAGE (OUTPATIENT)
Dept: NEUROLOGY | Facility: CLINIC | Age: 47
End: 2017-10-16

## 2018-01-22 ENCOUNTER — OFFICE VISIT (OUTPATIENT)
Dept: PHYSICAL MEDICINE AND REHAB | Facility: CLINIC | Age: 48
End: 2018-01-22
Payer: COMMERCIAL

## 2018-01-22 VITALS
HEART RATE: 81 BPM | HEIGHT: 63 IN | DIASTOLIC BLOOD PRESSURE: 71 MMHG | BODY MASS INDEX: 20.88 KG/M2 | SYSTOLIC BLOOD PRESSURE: 101 MMHG | WEIGHT: 117.81 LBS

## 2018-01-22 DIAGNOSIS — R25.1 TREMORS OF NERVOUS SYSTEM: Primary | ICD-10-CM

## 2018-01-22 PROCEDURE — 99999 PR PBB SHADOW E&M-EST. PATIENT-LVL III: CPT | Mod: PBBFAC,,, | Performed by: PHYSICAL MEDICINE & REHABILITATION

## 2018-01-22 PROCEDURE — 99204 OFFICE O/P NEW MOD 45 MIN: CPT | Mod: S$GLB,,, | Performed by: PHYSICAL MEDICINE & REHABILITATION

## 2018-01-22 RX ORDER — BACLOFEN 10 MG/1
10 TABLET ORAL NIGHTLY
Qty: 30 TABLET | Refills: 6 | Status: SHIPPED | OUTPATIENT
Start: 2018-01-22 | End: 2018-06-07

## 2018-01-22 RX ORDER — PROGESTERONE 100 MG/1
50 CAPSULE ORAL DAILY
COMMUNITY
End: 2018-10-16

## 2018-01-22 NOTE — PROGRESS NOTES
HPI:  Patient is a 47 y.o. year old female previously evaluated by numerous neurologist including  and . Initially was diagnosed w. Essential tremor. She was a musician for over 20yrs and was told there was also a possibility of having developed musician's dystonia. The last diagnosis was parkinson's. She was at one point started on Carbidopa/levidopa with no improvement. MRI of brain was nl. MRI of cervical spine showed some degenerative changes, but neither indicated an etiology of her symptoms.  Her tremors originated in the left arm, which was the one she would use the most during her 20yrs as a musician. Currently she has noticed tremors of her leg. She does feel subjective weakness of her left side of her body. She also describes muscle spasms and tightness on this side. She routinely goes to a massage therapist to help w. The muscle tightness.    Imaging   MRI cervical spine  Multilevel degenerative facet joint dz  Small broad based central disc protrusion w. Mild encroachment of anterior thecal sac at C5/C6    Mri brain  Nl    Labs  Low hormones-cortisol, progesterone, estradiol, testorone being followed by endocrinology  Low total T4, myron TPO      Past Medical History:   Diagnosis Date    Allergy     Hypothyroidism     Sleep apnea      Past Surgical History:   Procedure Laterality Date    KNEE SURGERY      bilateral    MOUTH SURGERY      NOSE SURGERY       Family History   Problem Relation Age of Onset    Parkinsonism Father     Cancer Maternal Aunt      breast    Breast cancer Maternal Aunt     Ovarian cancer Maternal Aunt      Social History     Social History    Marital status:      Spouse name: N/A    Number of children: N/A    Years of education: N/A     Social History Main Topics    Smoking status: Never Smoker    Smokeless tobacco: Never Used    Alcohol use 0.0 oz/week      Comment: occasional    Drug use: No    Sexual activity: Not on file     Other  Topics Concern    Not on file     Social History Narrative    No narrative on file       Review of patient's allergies indicates:   Allergen Reactions    Codeine     Hydrocodone     Sulfa (sulfonamide antibiotics)        Current Outpatient Prescriptions:     escitalopram oxalate (LEXAPRO) 10 MG tablet, Take 1 tablet (10 mg total) by mouth once daily., Disp: 90 tablet, Rfl: 1    levothyroxine (SYNTHROID) 88 MCG tablet, Take 1 tablet (88 mcg total) by mouth before breakfast., Disp: 90 tablet, Rfl: 3    liothyronine (CYTOMEL) 5 MCG Tab, TAKE 1 TABLET BY MOUTH TWICE DAILY, Disp: 60 tablet, Rfl: 3    METANX 3-35-2 mg Tab, TAKE ONE TABLET BY MOUTH ONE TIME DAILY, Disp: 90 tablet, Rfl: 3    progesterone (PROMETRIUM) 100 MG capsule, Take 100 mg by mouth once daily. Pt takes 150mg from [facility], Disp: , Rfl:     VITAMIN D2 50,000 unit capsule, 50,000 Units every 30 days. , Disp: , Rfl:     baclofen (LIORESAL) 10 MG tablet, Take 1 tablet (10 mg total) by mouth every evening., Disp: 30 tablet, Rfl: 6    carbidopa-levodopa  mg (SINEMET)  mg per tablet, Take 1 tab twice per day, Disp: 60 tablet, Rfl: 0    meloxicam (MOBIC) 15 MG tablet, TAKE 1 TABLET BY MOUTH ONCE DAILY AS NEEDED FOR KNEE PAIN, Disp: 90 tablet, Rfl: 0    trazodone (DESYREL) 50 MG tablet, Tab 1 1/2 po at HS as needed for insomnia., Disp: 135 tablet, Rfl: 1      Review of Systems:  No nausea, vomiting, fevers, chills , contipation, diarrhea +sweats,no weight change, occ neck stiffness, no chest pain, no sob, no change of bowel or bladder habits,no coordination issues        Physical Exam:      Vitals:    01/22/18 0809   BP: 101/71   Pulse: 81   alert and oriented ×4 follows commands answers all questions appropriately,affect wnl  Manual muscle test 5 out of 5 except triceps 4/5 sensation to light touch grossly intact  +resting tremors left hand and left leg  Good arm swing   No masked facies  No shuffling of gait  Nl  gait  -slR  -JOHN  Full hip ROM  babinsky down  No clonus  DTR's symmetric 2+  No C/C/E        Assessment:  Parkinsonian symptoms including tremors  Spasms likely d/t #1  Perimenopause  Left arm weakness  History of parkinson's (father)    Plan:  Emg/ncs of left arm and leg to rule out other sources of weakness and tremors  Trial of baclofen  This is likely a parkinson's presentation, does not appear as a focal dystonia (ie musciians dystonia) but more widespread. I have recommended she follow wJanet Mchugh. It may be to her benefit to do a trial of Artane vs other parkinson's medication that was previously recommended by movement disorder specialist

## 2018-02-02 ENCOUNTER — PROCEDURE VISIT (OUTPATIENT)
Dept: PHYSICAL MEDICINE AND REHAB | Facility: CLINIC | Age: 48
End: 2018-02-02
Payer: COMMERCIAL

## 2018-02-02 ENCOUNTER — OFFICE VISIT (OUTPATIENT)
Dept: PHYSICAL MEDICINE AND REHAB | Facility: CLINIC | Age: 48
End: 2018-02-02
Payer: COMMERCIAL

## 2018-02-02 ENCOUNTER — PATIENT MESSAGE (OUTPATIENT)
Dept: PHYSICAL MEDICINE AND REHAB | Facility: CLINIC | Age: 48
End: 2018-02-02

## 2018-02-02 VITALS
WEIGHT: 117.75 LBS | SYSTOLIC BLOOD PRESSURE: 108 MMHG | HEIGHT: 63 IN | BODY MASS INDEX: 20.86 KG/M2 | DIASTOLIC BLOOD PRESSURE: 71 MMHG | HEART RATE: 81 BPM

## 2018-02-02 DIAGNOSIS — R25.1 TREMORS OF NERVOUS SYSTEM: ICD-10-CM

## 2018-02-02 DIAGNOSIS — M54.16 LUMBAR RADICULOPATHY, CHRONIC: ICD-10-CM

## 2018-02-02 PROCEDURE — 95886 MUSC TEST DONE W/N TEST COMP: CPT | Mod: S$GLB,,, | Performed by: PHYSICAL MEDICINE & REHABILITATION

## 2018-02-02 PROCEDURE — 99999 PR PBB SHADOW E&M-EST. PATIENT-LVL III: CPT | Mod: PBBFAC,,, | Performed by: PHYSICAL MEDICINE & REHABILITATION

## 2018-02-02 PROCEDURE — 99214 OFFICE O/P EST MOD 30 MIN: CPT | Mod: S$GLB,,, | Performed by: PHYSICAL MEDICINE & REHABILITATION

## 2018-02-02 PROCEDURE — 95913 NRV CNDJ TEST 13/> STUDIES: CPT | Mod: S$GLB,,, | Performed by: PHYSICAL MEDICINE & REHABILITATION

## 2018-02-02 PROCEDURE — 3008F BODY MASS INDEX DOCD: CPT | Mod: S$GLB,,, | Performed by: PHYSICAL MEDICINE & REHABILITATION

## 2018-02-02 NOTE — PROCEDURES
Procedures     [facility]  Emily Cho D.O.  Physical Medicine and Rehab  Phone: [phone]  Fax: [phone]              Patient: Nathalie Rodriguez   Patient ID: [id number]   Sex: Female   YOB: 1970   Age: 47 Years 4 Months   Notes:     Last visit date: 2/2/2018             Visit date and time: 2/2/2018 07:53   Patient Age on Visit Date: 47 Years 4 Months   Referring Physician:     Diagnoses:               Sensory NCS      Nerve / Sites Rec. Site Onset Lat Peak Lat Ref. NP Amp Ref. PP Amp Ref. Segments Distance Velocity     ms ms ms µV µV µV µV  cm m/s   R Median - Digit II (Antidromic)      Wrist Dig II 3.23 4.01 ?3.40 17.7 ?15.0 28.9 ?20.0 Wrist - Dig II 13 40   L Median - Digit II (Antidromic)      Wrist Dig II 2.76 3.65 ?3.40 23.4 ?15.0 40.6 ?20.0 Wrist - Dig II 13 47   R Ulnar - Digit V (Antidromic)      Wrist Dig V 2.34 3.07 ?3.10 16.2 ?10.0 29.8 ?15.0 Wrist - Dig V 11 47   L Ulnar - Digit V (Antidromic)      Wrist Dig V 2.08 2.76 ?3.10 18.5 ?10.0 32.1 ?15.0 Wrist - Dig V 11 53   R Sural - Ankle (Calf)      Calf Ankle 2.86 4.17 ?4.20 15.9 ?5.0 5.2 ?5.0 Calf - Ankle 14 49   L Sural - Ankle (Calf)      Calf Ankle 3.65 4.17 ?4.20 5.5 ?5.0 5.0 ?5.0 Calf - Ankle 14 38   R Superficial peroneal - Ankle      Lat leg Ankle 1.46 1.77 ?4.40 16.5 ?5.0 18.8 ?5.0 Lat leg - Ankle 14 96   L Superficial peroneal - Ankle      Lat leg Ankle 0.89 1.25 ?4.40 10.4 ?5.0 13.4 ?5.0 Lat leg - Ankle 14 158           Motor NCS      Nerve / Sites Muscle Latency Ref. Amplitude Ref. Amp % Duration Segments Distance Lat Diff Velocity Ref.     ms ms mV mV % ms  cm ms m/s m/s   L Median - APB      Wrist APB 3.65 ?4.40 5.3 ?4.0 100 8.54 Wrist - APB 7         Elbow APB 7.86  4.5  84.8 8.65 Elbow - Wrist 20.5 4.22 49 ?49   R Median - APB      Wrist APB 3.85 ?4.40 6.8 ?4.0 100 6.98 Wrist - APB 7         Elbow APB 8.07  6.1  89.8 6.93 Elbow - Wrist 20 4.22 47 ?49   L Ulnar - ADM      Wrist ADM 2.71 ?3.60 9.4 ?5.0 100  5.26 Wrist - ADM 7         B.Elbow ADM 6.41  11.2  119 5.26 B.Elbow - Wrist 18.5 3.70 50 ?49      A.Elbow ADM 8.33  10.2  108 5.36 A.Elbow - B.Elbow 10 1.93 52 ?49   R Ulnar - ADM      Wrist ADM 2.86 ?3.60 7.1 ?5.0 100 5.89 Wrist - ADM 7         B.Elbow ADM 6.04  10.3  146 5.78 B.Elbow - Wrist 19 3.18 60 ?49      A.Elbow ADM 8.02  10.8  152 5.73 A.Elbow - B.Elbow 10 1.98 51 ?49   R Peroneal - EDB      Ankle EDB 4.43 ?6.20 5.5 ?2.0 100 6.61 Ankle - EDB 8         Fib head EDB 10.21  5.6  102 7.08 Fib head - Ankle 27.5 5.78 48 ?39      Pop fossa EDB 11.51  6.7  123 7.81 Pop fossa - Fib head 10 1.30 77 ?39   L Peroneal - EDB      Ankle EDB 7.55 ?6.20 0.1 ?2.0 100 11.82 Ankle - EDB 8         Pop fossa EDB       Pop fossa - Ankle    ?39   R Tibial - AH      Ankle AH 4.53 ?6.00 11.8 ?3.0 100 7.92 Ankle - AH 8         Pop fossa AH 12.29  10.1  85.3  Pop fossa - Ankle 35 7.76 45 ?39   L Tibial - AH      Ankle AH 3.96 ?6.00 13.7 ?3.0 100 8.13 Ankle - AH 8         Pop fossa AH 11.67  7.1  51.8  Pop fossa - Ankle 32 7.71 42 ?39           F  Wave      Nerve Fmin Ref.    ms ms   R Tibial - AH 45.94 ?58.00   L Tibial - AH 48.02 ?58.00           EMG          EMG Summary Table     Spontaneous MUAP Recruitment   Muscle IA Fib PSW Fasc H.F. Amp Dur. PPP Pattern   L. Biceps femoris (short head) N None None None None N N N N   R. Biceps femoris (short head) N None None None None N N N N   L. Vastus lateralis N None None None None 1+ 1+ 1+ Reduced   R. Vastus lateralis N None None None None 1+ 1+ 1+ Reduced   L. Gastrocnemius (Medial head) N None None None None N N N N   R. Gastrocnemius (Medial head) N None None None None N N N N   L. Rectus femoris N None None None None 1+ 1+ 1+ Reduced   R. Rectus femoris N None None None None 1+ 1+ 1+ Reduced   L. Tibialis anterior N None None None None 1+ 1+ 1+ Reduced   R. Tibialis anterior N None None None None 1+ 1+ 1+ Reduced   L. Lumbar paraspinals (low) N None None None None N N N N   R.  Lumbar paraspinals (low) N None None None None N N N N   L. Lumbar paraspinals (mid) N None None None None N N N N   R. Lumbar paraspinals (mid) N None None None None N N N N   L. Biceps brachii N None None None None N N N + intention and restingtremors   R. Biceps brachii N None None None None N N N N   L. Deltoid N None None None None N N N    R. Deltoid N None None None None N N N N   L. Triceps brachii N None None None None N N N +intention/resting tremor   R. Triceps brachii N None None None None N N N N   L. Extensor carpi radialis brevis N None None None None N N N N   R. Extensor carpi radialis brevis N None None None None N N N N   L. First dorsal interosseous N None None None None N N N N   R. First dorsal interosseous N None None None None N N N N           Summary    The motor conduction test was performed on 8 nerve(s). The results were normal in 5 nerve(s): L Ulnar - ADM, R Ulnar - ADM, R Peroneal - EDB, R Tibial - AH, L Tibial - AH. Results outside the specified normal range were found in 3 nerve(s), as follows:   In the L Median - APB study  o the take off velocity result was reduced for Elbow - Wrist segment   In the R Median - APB study  o the take off velocity result was reduced for Elbow - Wrist segment   In the L Peroneal - EDB study  o the take off latency result was increased for Ankle stimulation  o the peak amplitude result was reduced for Ankle stimulation    The sensory conduction test was performed on 8 nerve(s). The results were normal in 6 nerve(s): R Ulnar - Digit V (Antidromic), L Ulnar - Digit V (Antidromic), R Sural - Ankle (Calf), L Sural - Ankle (Calf), R Superficial peroneal - Ankle, L Superficial peroneal - Ankle. Results outside the specified normal range were found in 2 nerve(s), as follows:   In the R Median - Digit II (Antidromic) study  o the peak latency result was increased for Wrist stimulation   In the L Median - Digit II (Antidromic) study  o the peak latency  result was increased for Wrist stimulation    The F wave study was normal in all 2 of the tested nerves: R Tibial - AH, L Tibial - AH.    The needle EMG examination was performed in 24 muscles. It was normal in 15 muscle(s): L. Biceps femoris (short head), R. Biceps femoris (short head), L. Gastrocnemius (Medial head), R. Gastrocnemius (Medial head), L. Lumbar paraspinals (low), R. Lumbar paraspinals (low), L. Lumbar paraspinals (mid), R. Lumbar paraspinals (mid), R. Biceps brachii, R. Deltoid, R. Triceps brachii, L. Extensor carpi radialis brevis, R. Extensor carpi radialis brevis, L. First dorsal interosseous, R. First dorsal interosseous. The study was abnormal in 9 muscle(s), with the following distribution:   The MUP waveform abnormality was found in L. Vastus lateralis, R. Vastus lateralis, L. Rectus femoris, R. Rectus femoris, L. Tibialis anterior, R. Tibialis anterior.   Abnormal interference pattern was found in L. Vastus lateralis, R. Vastus lateralis, L. Rectus femoris, R. Rectus femoris, L. Tibialis anterior, R. Tibialis anterior, L. Biceps brachii, L. Deltoid, L. Triceps brachii.              Conclusion:   Moderate bilateral carpal tunnel syndrome  Mild right chronic L4/L5 radiculopathy with NO active denervation  Moderate left chronic L4/L5 radiculopathy with NO active denervation  There were no dystonic waveforms noted  There were prominent tremors of the left arm, both intention and resting tremors, in all muscles tested in the left arm..  There were occasional tremors in the left tibialis anterior , to a much lesser degree than the arm. They were both intention and resting tremors.          ____________________________  Emily Cho D.O.

## 2018-02-02 NOTE — PROGRESS NOTES
HPI:  Patient is a 47 y.o. year old female w. Parkinsonian symptoms including tremors of the left arm and leg. She had an emg/ncs today which indicated the following:    Moderate bilateral carpal tunnel syndrome  Mild right chronic L4/L5 radiculopathy with NO active denervation  Moderate left chronic L4/L5 radiculopathy with NO active denervation  There were no dystonic waveforms noted  There were prominent tremors of the left arm, both intention and resting tremors, in all muscles tested in the left arm..  There were occasional tremors in the left tibialis anterior , to a much lesser degree than the arm. They were both intention and resting tremors.       Past Medical History:   Diagnosis Date    Allergy     Hypothyroidism     Sleep apnea      Past Surgical History:   Procedure Laterality Date    KNEE SURGERY      bilateral    MOUTH SURGERY      NOSE SURGERY       Family History   Problem Relation Age of Onset    Parkinsonism Father     Cancer Maternal Aunt      breast    Breast cancer Maternal Aunt     Ovarian cancer Maternal Aunt      Social History     Social History    Marital status:      Spouse name: N/A    Number of children: N/A    Years of education: N/A     Social History Main Topics    Smoking status: Never Smoker    Smokeless tobacco: Never Used    Alcohol use 0.0 oz/week      Comment: occasional    Drug use: No    Sexual activity: Not on file     Other Topics Concern    Not on file     Social History Narrative    No narrative on file       Review of patient's allergies indicates:   Allergen Reactions    Codeine     Hydrocodone     Sulfa (sulfonamide antibiotics)        Current Outpatient Prescriptions:     baclofen (LIORESAL) 10 MG tablet, Take 1 tablet (10 mg total) by mouth every evening., Disp: 30 tablet, Rfl: 6    carbidopa-levodopa  mg (SINEMET)  mg per tablet, Take 1 tab twice per day, Disp: 60 tablet, Rfl: 0    escitalopram oxalate (LEXAPRO) 10 MG  tablet, Take 1 tablet (10 mg total) by mouth once daily., Disp: 90 tablet, Rfl: 1    levothyroxine (SYNTHROID) 88 MCG tablet, Take 1 tablet (88 mcg total) by mouth before breakfast., Disp: 90 tablet, Rfl: 3    liothyronine (CYTOMEL) 5 MCG Tab, TAKE 1 TABLET BY MOUTH TWICE DAILY, Disp: 60 tablet, Rfl: 3    meloxicam (MOBIC) 15 MG tablet, TAKE 1 TABLET BY MOUTH ONCE DAILY AS NEEDED FOR KNEE PAIN, Disp: 90 tablet, Rfl: 0    METANX 3-35-2 mg Tab, TAKE ONE TABLET BY MOUTH ONE TIME DAILY, Disp: 90 tablet, Rfl: 3    progesterone (PROMETRIUM) 100 MG capsule, Take 100 mg by mouth once daily. Pt takes 150mg from [facility], Disp: , Rfl:     trazodone (DESYREL) 50 MG tablet, Tab 1 1/2 po at HS as needed for insomnia., Disp: 135 tablet, Rfl: 1    VITAMIN D2 50,000 unit capsule, 50,000 Units every 30 days. , Disp: , Rfl:       Review of Systems  No nausea, vomiting, fevers, Chills , contipation, diarrhea or sweats      Physical Exam:      Vitals:    02/02/18 0744   BP: 108/71   Pulse: 81     alert and oriented ×4 follows commands answers all questions appropriately,affect wnl  Manual muscle test 5 out of 5 sensation to light touch grossly intact  +tremors lue  And left foot  Nl gait  No C/C/E      Assessment:  Parkinsonian symptoms  Moderate bilateral carpal tunnel syndrome  Mild right chronic L4/L5 radiculopathy with NO active denervation  Moderate left chronic L4/L5 radiculopathy with NO active denervation    Plan:  Follow up w. Movement disorder specialist -tremors likely related to parkinson's  No dystonia noted on emg, so I have not offered botox but will defer to movement specialist  Will get MRI of lumbar spine d/t emg findings  Use carpal tunnel splints for now

## 2018-02-05 ENCOUNTER — TELEPHONE (OUTPATIENT)
Dept: NEUROLOGY | Facility: CLINIC | Age: 48
End: 2018-02-05

## 2018-02-05 NOTE — TELEPHONE ENCOUNTER
----- Message from Alejandro Herrera sent at 2/5/2018  9:04 AM CST -----  Contact: Patient @ [phone]  Patient is requesting a return call about scheduling a NP appt on the [facility] with  to consult on a movement disorder,pls call

## 2018-02-06 ENCOUNTER — TELEPHONE (OUTPATIENT)
Dept: PHYSICAL MEDICINE AND REHAB | Facility: CLINIC | Age: 48
End: 2018-02-06

## 2018-02-06 NOTE — TELEPHONE ENCOUNTER
----- Message from Kira Jurado sent at 2/6/2018  2:16 PM CST -----  Contact Padmaja with [facility] regarding obtaining a signed order for patients appointment tomorrow fax to [phone] cotnact # [phone].    Thank you

## 2018-02-06 NOTE — TELEPHONE ENCOUNTER
Dr. Cho is gone for the day, but she will sign the order first thing in the morning and I will fax to [facility] tomorrow.

## 2018-02-07 ENCOUNTER — TELEPHONE (OUTPATIENT)
Dept: PHYSICAL MEDICINE AND REHAB | Facility: CLINIC | Age: 48
End: 2018-02-07

## 2018-02-07 NOTE — TELEPHONE ENCOUNTER
----- Message from Catrinachris Atkinson sent at 2/7/2018 10:50 AM CST -----  Patient is at the office. [facility] states they need the orders signed and fax back.  Please fax to [phone].  Any questions call  [phone]

## 2018-02-07 NOTE — TELEPHONE ENCOUNTER
Spoke with [facility] and let them know I would fax the order today. She thanked me for calling and is scanning the pt now.

## 2018-02-08 ENCOUNTER — PATIENT MESSAGE (OUTPATIENT)
Dept: PHYSICAL MEDICINE AND REHAB | Facility: CLINIC | Age: 48
End: 2018-02-08

## 2018-02-09 ENCOUNTER — OFFICE VISIT (OUTPATIENT)
Dept: PHYSICAL MEDICINE AND REHAB | Facility: CLINIC | Age: 48
End: 2018-02-09
Payer: COMMERCIAL

## 2018-02-09 ENCOUNTER — TELEPHONE (OUTPATIENT)
Dept: PHYSICAL MEDICINE AND REHAB | Facility: CLINIC | Age: 48
End: 2018-02-09

## 2018-02-09 VITALS
WEIGHT: 117.75 LBS | HEART RATE: 80 BPM | SYSTOLIC BLOOD PRESSURE: 96 MMHG | DIASTOLIC BLOOD PRESSURE: 63 MMHG | HEIGHT: 63 IN | BODY MASS INDEX: 20.86 KG/M2

## 2018-02-09 DIAGNOSIS — M79.10 MYALGIA: ICD-10-CM

## 2018-02-09 DIAGNOSIS — G20.A1 PARKINSON DISEASE: Primary | ICD-10-CM

## 2018-02-09 DIAGNOSIS — M54.2 CERVICALGIA: ICD-10-CM

## 2018-02-09 PROCEDURE — 99214 OFFICE O/P EST MOD 30 MIN: CPT | Mod: 25,S$GLB,, | Performed by: PHYSICAL MEDICINE & REHABILITATION

## 2018-02-09 PROCEDURE — 99999 PR PBB SHADOW E&M-EST. PATIENT-LVL III: CPT | Mod: PBBFAC,,, | Performed by: PHYSICAL MEDICINE & REHABILITATION

## 2018-02-09 PROCEDURE — 20553 NJX 1/MLT TRIGGER POINTS 3/>: CPT | Mod: S$GLB,,, | Performed by: PHYSICAL MEDICINE & REHABILITATION

## 2018-02-09 PROCEDURE — 3008F BODY MASS INDEX DOCD: CPT | Mod: S$GLB,,, | Performed by: PHYSICAL MEDICINE & REHABILITATION

## 2018-02-09 RX ORDER — LIDOCAINE HYDROCHLORIDE 10 MG/ML
3 INJECTION INFILTRATION; PERINEURAL ONCE
Status: COMPLETED | OUTPATIENT
Start: 2018-02-09 | End: 2018-02-09

## 2018-02-09 RX ORDER — GABAPENTIN 300 MG/1
300 CAPSULE ORAL NIGHTLY
Qty: 30 CAPSULE | Refills: 11 | Status: SHIPPED | OUTPATIENT
Start: 2018-02-09 | End: 2018-06-07

## 2018-02-09 RX ADMIN — LIDOCAINE HYDROCHLORIDE 3 ML: 10 INJECTION INFILTRATION; PERINEURAL at 03:02

## 2018-02-09 NOTE — PROGRESS NOTES
HPI:  Patient is a 47 y.o. year old female w. Left sided neck pain. It hurts to ambulate. She was last eval. For emg which indicated b/l l4/L5 radics left>right. MRI of lspine was ordered. She is having a sensation on tightness down her legs L>R but not significant pain. No weakness. Continues to have tremors in left arm and leg.     Imaging  Mild facet arthropathy at L4/L5 and L5/S1 wout significant compromise.      Past Medical History:   Diagnosis Date    Allergy     Hypothyroidism     Sleep apnea      Past Surgical History:   Procedure Laterality Date    KNEE SURGERY      bilateral    MOUTH SURGERY      NOSE SURGERY       Family History   Problem Relation Age of Onset    Parkinsonism Father     Cancer Maternal Aunt      breast    Breast cancer Maternal Aunt     Ovarian cancer Maternal Aunt      Social History     Social History    Marital status:      Spouse name: N/A    Number of children: N/A    Years of education: N/A     Social History Main Topics    Smoking status: Never Smoker    Smokeless tobacco: Never Used    Alcohol use 0.0 oz/week      Comment: occasional    Drug use: No    Sexual activity: Not on file     Other Topics Concern    Not on file     Social History Narrative    No narrative on file       Review of patient's allergies indicates:   Allergen Reactions    Codeine     Hydrocodone     Sulfa (sulfonamide antibiotics)        Current Outpatient Prescriptions:     baclofen (LIORESAL) 10 MG tablet, Take 1 tablet (10 mg total) by mouth every evening., Disp: 30 tablet, Rfl: 6    carbidopa-levodopa  mg (SINEMET)  mg per tablet, Take 1 tab twice per day, Disp: 60 tablet, Rfl: 0    escitalopram oxalate (LEXAPRO) 10 MG tablet, Take 1 tablet (10 mg total) by mouth once daily., Disp: 90 tablet, Rfl: 1    levothyroxine (SYNTHROID) 88 MCG tablet, Take 1 tablet (88 mcg total) by mouth before breakfast., Disp: 90 tablet, Rfl: 3    liothyronine (CYTOMEL) 5 MCG Tab,  "TAKE 1 TABLET BY MOUTH TWICE DAILY, Disp: 60 tablet, Rfl: 3    meloxicam (MOBIC) 15 MG tablet, TAKE 1 TABLET BY MOUTH ONCE DAILY AS NEEDED FOR KNEE PAIN, Disp: 90 tablet, Rfl: 0    METANX 3-35-2 mg Tab, TAKE ONE TABLET BY MOUTH ONE TIME DAILY, Disp: 90 tablet, Rfl: 3    progesterone (PROMETRIUM) 100 MG capsule, Take 100 mg by mouth once daily. Pt takes 150mg from compound pharm, Disp: , Rfl:     trazodone (DESYREL) 50 MG tablet, Tab 1 1/2 po at HS as needed for insomnia., Disp: 135 tablet, Rfl: 1    VITAMIN D2 50,000 unit capsule, 50,000 Units every 30 days. , Disp: , Rfl:         Review of Systems  No nausea, vomiting, fevers, Chills , contipation, diarrhea or sweats      Physical Exam:      Vitals:    02/09/18 1436   BP: 96/63   Pulse: 80   alert and oriented ×4 follows commands answers all questions appropriately,affect wnl  Manual muscle test 5 out of 5 sensation to light touch grossly intact  +tenderness left cervical paraspinals  Dec rom of cervical spine  +tremors LUE  Nl gait  No C/C/E      Assessment:  cervcial myofascial pain  Parkinson's symptoms  Lumbar radic  Plan:  F/u w. Dr Leonardo-sent her email pt. Would like to establish in the [facility]  tpi's to neck today  Trial of neurontin  Big an loud therapy at [facility]  PROCEDURE NOTE:Risk and benefit of trigger point injections given to pt. Injections performed w. A" 25G needle after sterile prep w. Betadine, verbal consent obtained . NO complications. b/l trapezius, splenius cap and lev scapulae were inected with a total of 3ML of 1% Lidocaine            "

## 2018-02-16 ENCOUNTER — TELEPHONE (OUTPATIENT)
Dept: NEUROLOGY | Facility: CLINIC | Age: 48
End: 2018-02-16

## 2018-02-16 NOTE — TELEPHONE ENCOUNTER
----- Message from Dariela Leonardo MD sent at 2/16/2018  7:40 AM CST -----      ----- Message -----  From: Emily Cho DO  Sent: 2/9/2018   2:53 PM  To: Dariela Leonardo MD    Could you evaluate this pt. She has parkinson's symptoms.  has seen her in past, but she would like to establish in the [facility]. thanks

## 2018-02-19 ENCOUNTER — TELEPHONE (OUTPATIENT)
Dept: PHYSICAL MEDICINE AND REHAB | Facility: CLINIC | Age: 48
End: 2018-02-19

## 2018-02-19 NOTE — TELEPHONE ENCOUNTER
----- Message from RT Miguelina sent at 2/19/2018 10:16 AM CST -----  Contact: Leonor [phone] [facility] Re[facility]     Leonor , w [facility] rehab needs the pt's  orders please fax to , thanks.

## 2018-02-20 ENCOUNTER — PATIENT MESSAGE (OUTPATIENT)
Dept: PHYSICAL MEDICINE AND REHAB | Facility: CLINIC | Age: 48
End: 2018-02-20

## 2018-03-02 ENCOUNTER — PATIENT MESSAGE (OUTPATIENT)
Dept: ENDOCRINOLOGY | Facility: CLINIC | Age: 48
End: 2018-03-02

## 2018-03-02 DIAGNOSIS — E03.9 HYPOTHYROIDISM, UNSPECIFIED TYPE: Primary | ICD-10-CM

## 2018-03-02 RX ORDER — THYROID 60 MG/1
60 TABLET ORAL
Qty: 30 TABLET | Refills: 11 | Status: SHIPPED | OUTPATIENT
Start: 2018-03-02 | End: 2018-05-23

## 2018-03-02 NOTE — TELEPHONE ENCOUNTER
Stp synthroid and cytomel. Take armour 60 mg once daily. Check TSH, FT4, Total T 3 in 6 weeks. On day of abs always take armour after labs

## 2018-03-16 ENCOUNTER — TELEPHONE (OUTPATIENT)
Dept: PHYSICAL MEDICINE AND REHAB | Facility: CLINIC | Age: 48
End: 2018-03-16

## 2018-03-26 RX ORDER — LIOTHYRONINE SODIUM 5 UG/1
TABLET ORAL
Qty: 60 TABLET | Refills: 2 | Status: SHIPPED | OUTPATIENT
Start: 2018-03-26 | End: 2018-10-16

## 2018-04-19 ENCOUNTER — PATIENT MESSAGE (OUTPATIENT)
Dept: NEUROLOGY | Facility: CLINIC | Age: 48
End: 2018-04-19

## 2018-04-24 NOTE — TELEPHONE ENCOUNTER
I'll be 4th or 5th opinion.  Wants [facility].  Records reviewed, ([facility], [facility]), but nothing of surprise.

## 2018-04-26 ENCOUNTER — OFFICE VISIT (OUTPATIENT)
Dept: NEUROLOGY | Facility: CLINIC | Age: 48
End: 2018-04-26
Payer: COMMERCIAL

## 2018-04-26 ENCOUNTER — PATIENT MESSAGE (OUTPATIENT)
Dept: NEUROLOGY | Facility: CLINIC | Age: 48
End: 2018-04-26

## 2018-04-26 VITALS
HEIGHT: 63 IN | WEIGHT: 118.81 LBS | DIASTOLIC BLOOD PRESSURE: 73 MMHG | SYSTOLIC BLOOD PRESSURE: 124 MMHG | HEART RATE: 81 BPM | BODY MASS INDEX: 21.05 KG/M2

## 2018-04-26 DIAGNOSIS — G20.A1 PARKINSON'S DISEASE: Primary | ICD-10-CM

## 2018-04-26 DIAGNOSIS — G24.8 HEMIDYSTONIA: ICD-10-CM

## 2018-04-26 DIAGNOSIS — G24.8 LIMB DYSTONIA: ICD-10-CM

## 2018-04-26 DIAGNOSIS — R25.1 TREMOR: ICD-10-CM

## 2018-04-26 DIAGNOSIS — Z86.39 HISTORY OF HASHIMOTO THYROIDITIS: ICD-10-CM

## 2018-04-26 PROCEDURE — 99215 OFFICE O/P EST HI 40 MIN: CPT | Mod: S$GLB,,, | Performed by: PSYCHIATRY & NEUROLOGY

## 2018-04-26 PROCEDURE — 99999 PR PBB SHADOW E&M-EST. PATIENT-LVL III: CPT | Mod: PBBFAC,,, | Performed by: PSYCHIATRY & NEUROLOGY

## 2018-04-26 RX ORDER — RASAGILINE 1 MG/1
TABLET ORAL
Qty: 90 TABLET | Refills: 3 | Status: SHIPPED | OUTPATIENT
Start: 2018-04-26 | End: 2018-06-12

## 2018-04-26 RX ORDER — RASAGILINE 0.5 MG/1
0.5 TABLET ORAL DAILY
Qty: 7 TABLET | Refills: 0 | Status: SHIPPED | OUTPATIENT
Start: 2018-04-26 | End: 2018-04-26 | Stop reason: CLARIF

## 2018-04-26 RX ORDER — RASAGILINE 1 MG/1
1 TABLET ORAL DAILY
Qty: 90 TABLET | Refills: 3 | Status: SHIPPED | OUTPATIENT
Start: 2018-04-26 | End: 2018-04-26 | Stop reason: SDUPTHER

## 2018-04-26 RX ORDER — CARBIDOPA AND LEVODOPA 25; 100 MG/1; MG/1
1 TABLET ORAL 3 TIMES DAILY
Qty: 90 TABLET | Refills: 4 | Status: SHIPPED | OUTPATIENT
Start: 2018-04-26 | End: 2018-06-12

## 2018-04-26 NOTE — LETTER
April 27, 2018      Emily Cho, [address] [place] [address]           [facility]  [address] [facility] [address] [place] [address]  Phone: [phone]  Fax: [phone]          Patient: Nathalie Rodriguez   MR Number: [id number]   YOB: 1970   Date of Visit: 4/26/2018       Dear Dr. Emily Cho:    Thank you for referring Nathalie Rodriguez to me for evaluation. Attached you will find relevant portions of my assessment and plan of care.    If you have questions, please do not hesitate to call me. I look forward to following Nathalie Rodriguez along with you.    Sincerely,    Dariela Leonardo MD    Enclosure  CC:  No Recipients    If you would like to receive this communication electronically, please contact [email or web address] or [phone] to request more information on [employer] access.    For providers and/or their staff who would like to refer a patient to [facility], please contact us through our one-stop-shop provider referral line, [facility], at [phone].    If you feel you have received this communication in error or would no longer like to receive these types of communications, please e-mail [email or web address]

## 2018-04-26 NOTE — PROGRESS NOTES
Nathalie LAL Chief Complaints during this visit:  New Patient visit for  parkinsonism    Emily Trish Cho, DO  [address] [place] [address]    Primary Care Physician  Osito Sykes, DO  [address] [place] [address]        History of present illness:   47 y.o.  female seen in consultation at the request of  Dr. Cho for evaluation of parkinsonism.  She was seen by Martir last year and has had multiple other neurologic second opinions ([facility], [place], [facility]).  Now looking for a home-base neurologist.  She would like to stay off medications as long as possible (mostly because of the trials and trauma seen in her father).  Tried one sinemet morning and evening.  No nausea and no effect.  Mucina puriens twice a day seemed to reduce the tightness.    Recognizes anxiety is a big trigger for her.  Insomnia triggers.  CBD oil were not helpful.  Off lexapro due to tremors.  Two docs have consider remeron.    3-4 years ago, had an action tremor at work, intermittently at work (with stained glass).  First 3 docs thought it was ET, offered meds that she declined.   Now the tremor is present most of the time, not just action tremor.     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  She really struggled with diagnosis and could not reach .  This spiraled into insomnia, anxiety.    Unique tried botox in left arm and foot.  Suggested amantadine, azilect.    Her father had PD.  She did the 23&Me, negative for LRRK.  Had whole genome at [facility], still pending.    Former symphony Bass player.    Has Hashimotos and luisito barr.      Interval history 6/14/17:  Patient states that she had insomnia about a week after he saw us. Started on Lexapro and Xannax by primary care doctor. Anxiety has been very up and down.   Patient has not been sleeping. She has not been sleeping. Tremors have been worse since last visit. She is not sure if anxiety is making things worse.   Has  not started CD/LD yet   She is also loosing weight.   Tightness in leg is worse.   She is very concerned about not sleeping. Has been about two weeks now.         History of Present Illness (HPI):  RH female of mixed southern  and med[place] descent. Pt has had a left hand tremor for about a year and a half now. Her father had PD, dx in 60s and passed in his 70s. She has seen Dr. Norman as well on the [facility]. She has also had a strange feeling in her left leg. It always feels tense, like she has to consciously relax it. No decrease in mobility or change in strength. She thinks this has not gotten any worse in the last few months but she gets a feeling as if her toes want to curl down as well. Tremor in the left hand can sometimes involve the arm, she feels like she has lost some strength in the left hand too. Tremor increases with stress. Not present at rest, only present during action. No feeling of pulling or twisting in the left hand or arm. Sometimes the left eye twitches. Has known bony abnormality of the neck that she reports as well.     II.  Review of systems:  As in HPI,  otherwise, balance 10 systems reviewed and are negative.    III.  Past Medical History:   Diagnosis Date    Allergy     Hypothyroidism     Sleep apnea      Family History   Problem Relation Age of Onset    Parkinsonism Father     Cancer Maternal Aunt      breast    Breast cancer Maternal Aunt     Ovarian cancer Maternal Aunt      Social History     Social History    Marital status:      Spouse name: N/A    Number of children: N/A    Years of education: N/A     Social History Main Topics    Smoking status: Never Smoker    Smokeless tobacco: Never Used    Alcohol use 0.0 oz/week      Comment: occasional    Drug use: No    Sexual activity: Not Asked     Other Topics Concern    None     Social History Narrative    None         Current Outpatient Prescriptions on File Prior to Visit   Medication Sig  "Dispense Refill    meloxicam (MOBIC) 15 MG tablet TAKE 1 TABLET BY MOUTH ONCE DAILY AS NEEDED FOR KNEE PAIN 90 tablet 0    METANX 3-35-2 mg Tab TAKE ONE TABLET BY MOUTH ONE TIME DAILY 90 tablet 3    progesterone (PROMETRIUM) 100 MG capsule Take 100 mg by mouth once daily. Pt takes 150mg from compound pharm      thyroid, pork, (ARMOUR THYROID) 60 mg Tab Take 1 tablet (60 mg total) by mouth before breakfast. 30 tablet 11    VITAMIN D2 50,000 unit capsule 50,000 Units every 30 days.       baclofen (LIORESAL) 10 MG tablet Take 1 tablet (10 mg total) by mouth every evening. 30 tablet 6    gabapentin (NEURONTIN) 300 MG capsule Take 1 capsule (300 mg total) by mouth every evening. 30 capsule 11    liothyronine (CYTOMEL) 5 MCG Tab TAKE 1 TABLET BY MOUTH TWICE DAILY 60 tablet 2    trazodone (DESYREL) 50 MG tablet Tab 1 1/2 po at HS as needed for insomnia. 135 tablet 1     No current facility-administered medications on file prior to visit.        PRIOR problem-specific medications tried:  Sinemet bid not helpful    Review of patient's allergies indicates:   Allergen Reactions    Codeine     Hydrocodone     Sulfa (sulfonamide antibiotics)        IV. Physical Exam    Vitals:    04/26/18 1512   BP: 124/73   Pulse: 81   Weight: 53.9 kg (118 lb 13.3 oz)   Height: 5' 3" (1.6 m)     Observational exam only as 100% of this 60min visit was in intake, counseling, education    General appearance: Well nourished, well developed, no acute distress.           -------------------------------------------------------------  Facial Expression: 1: Slight: Minimal masked facies manifested only by decreased frequency of blinking.       Affect: full       Orientation to time & place:  Oriented to time, place, person and situation       Attention & concentration:  Normal attention span and concentration       Fund of knowledge:  Aware of current events        Speech:  normal (not " dysarthric)  -------------------------------------------------------  Cranial nerves: wears glasses, face symmetrical, hearing intact        -------------------------------------------------------   MOVEMENT DISORDERS FOCUSED EXAM  Abnormality of movement (bradykinesia, hyperkinesia) present? Yes, bradykinetic, left posturing hand   Tremor present?   Yes, resting tremor left hand and foot      V.  Laboratory/ Radiological Data:          Lab Results   Component Value Date    TSH 0.111 (L) 12/09/2016     Lab Results   Component Value Date    DSWVKYUK98 1001 (H) 03/11/2016         VI. Assessment and Plan            Problem List Items Addressed This Visit        1 - High    Parkinson's disease - Primary    Overview     2013 Bradykinesia and left resting tremor.         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  She is reluctant, but ready for pharmacologic therapy.  VERY interested in DBS as she has seen the negative effects of medications.   -> start azilect   -> RE-test sinemet using my instructions   -> OT   -> DBS candidate for right gpi         Relevant Medications    carbidopa-levodopa  mg (SINEMET)  mg per tablet    rasagiline (AZILECT) 1 mg Tab    Tremor    Overview     High frequency, low amplitude             2     Hemidystonia    Overview     Feeling of pulling/tightness and toes curling mostly at night          Limb dystonia    Current Assessment & Plan     Left foot dystonia.   -> plan for botox next [facility]            3     History of Hashimoto thyroiditis    Current Assessment & Plan     Autoimmune history so I do wonder about her PD as a result of this in some way (not hashimotos, but other autoimmunity) and wonder if steroids or ivig would have any effect.   -> briefly discussed                     No Follow-up on file.

## 2018-04-26 NOTE — PATIENT INSTRUCTIONS
Test sinemet (carbidopa-levodopa) to see if it works on your tremor as well as your foot cramping.      Take one pill about 4 hours apart, 3 times a day.  Example:  Take one at 8am, 12pm and 4pm.  Do this for 3-4 days.  If you get nauseated, please stop taking it.  If you feel it HELPS your tremor or your walking, please let me know by eVoter message.  If you cannot tell any difference, DOUBLE the dose to 2 pills and then let me know.    Let me know results.

## 2018-04-27 ENCOUNTER — PATIENT MESSAGE (OUTPATIENT)
Dept: NEUROLOGY | Facility: CLINIC | Age: 48
End: 2018-04-27

## 2018-04-27 PROBLEM — G24.8 LIMB DYSTONIA: Status: ACTIVE | Noted: 2018-04-27

## 2018-04-27 NOTE — ASSESSMENT & PLAN NOTE
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  She is reluctant, but ready for pharmacologic therapy.  VERY interested in DBS as she has seen the negative effects of medications.   -> start azilect   -> RE-test sinemet using my instructions   -> OT   -> DBS candidate for right gpi

## 2018-04-27 NOTE — ASSESSMENT & PLAN NOTE
Autoimmune history so I do wonder about her PD as a result of this in some way (not hashimotos, but other autoimmunity) and wonder if steroids or ivig would have any effect.   -> briefly discussed

## 2018-04-28 ENCOUNTER — PATIENT MESSAGE (OUTPATIENT)
Dept: NEUROLOGY | Facility: CLINIC | Age: 48
End: 2018-04-28

## 2018-04-30 DIAGNOSIS — G24.8 LIMB DYSTONIA: Primary | ICD-10-CM

## 2018-05-01 ENCOUNTER — PATIENT MESSAGE (OUTPATIENT)
Dept: NEUROLOGY | Facility: CLINIC | Age: 48
End: 2018-05-01

## 2018-05-02 ENCOUNTER — PATIENT MESSAGE (OUTPATIENT)
Dept: NEUROLOGY | Facility: CLINIC | Age: 48
End: 2018-05-02

## 2018-05-02 NOTE — LETTER
May 8, 2018    Pharmacy             [facility]  [address] [facility] [address] [place] [address]  Phone: [phone]  Fax: [phone]   Patient: Nathalie Rodriguez   MR Number: [id number]   YOB: 1970   Date of Visit: 5/2/2018 5/7/18  Naltrexone 1.5mg QHS.  Defer formulation choice to pharmacist.     One month supply, 3 refills.                       Dariela Leonardo MD  NARCISA:  [id number]

## 2018-05-08 ENCOUNTER — CLINICAL SUPPORT (OUTPATIENT)
Dept: REHABILITATION | Facility: HOSPITAL | Age: 48
End: 2018-05-08
Attending: PSYCHIATRY & NEUROLOGY
Payer: COMMERCIAL

## 2018-05-08 ENCOUNTER — PATIENT MESSAGE (OUTPATIENT)
Dept: NEUROLOGY | Facility: CLINIC | Age: 48
End: 2018-05-08

## 2018-05-08 DIAGNOSIS — R29.898 DECREASED GRIP STRENGTH: Primary | ICD-10-CM

## 2018-05-08 DIAGNOSIS — R29.898 DECREASED PINCH STRENGTH: ICD-10-CM

## 2018-05-08 PROCEDURE — 97165 OT EVAL LOW COMPLEX 30 MIN: CPT | Mod: PN

## 2018-05-08 PROCEDURE — 97110 THERAPEUTIC EXERCISES: CPT | Mod: 59,PN

## 2018-05-09 NOTE — PLAN OF CARE
"Occupational Therapy Evaluation    Patient:  Nathalie Rodriguez "Maile"  Date of Therapy Visit:  2018  Referring Physician:  Dr Dariela Leonardo  Diagnosis: Parkinson's disease   MRN : [id number]  Referral Orders:  Eval and treat     Start Time: 400pm  End Time:  500pm  Total Time:  60 mins    Certification period from to 18  Visit # 1 of 20      HISTORY/OCCUPATIONAL PROFILE/ Medical and Therapy History:  Subjective:  Patient is a 47 year old  Right hand dominant female who presents for occupational therapy today,2018 with a diagnosis of Parkinson's disease.  She states she has a light tremor in her for the past 3 years.  She feels that her hand is "slower" on the left.   She is also complaining of loss of strength in her left hand.  She lives locally and went to OT one time about a month ago but did not want to go back to therapy because she did not feel she was given "useful information".  She does the "Big [facility]" for Parkinson's every day.  She has had a trial of botox injections to her arm and her foot. Her father had parkinson's disease.    Date of onset:  3-4 years ago  Location of injury: left hand     Current History of Injury /Mechanism of Injury:  unknown  Rehabilitation Expectation/Goals: Patient's goals for therapy are to be able to  -increase strength  Pain:   During no work/At Rest:     0 out of 10   While working/ With Activity:  0 out of 10   Sleepin out of 10      Previous Medical Management/ Past Medical History/Physical Systems Review:    She has a history of hashimoto's and luisito barr  Past Medical History:   Diagnosis Date    Allergy     Hypothyroidism     Sleep apnea      Review of patient's allergies indicates:   Allergen Reactions    Codeine     Hydrocodone     Sulfa (sulfonamide antibiotics)        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     FUNCTIONAL STATUS:   Previous functional status includes: Independent with all ADLs and " ambulating without assistance   Current FunctionalStatus:  Independent with all ADLs and ambulating without assistance   A typical day includes:  Exercise 7 days per week   Exercise routine prior to onset : cardio 30 mins, ana lilia 2 x week, modesta chi 2 x week, weight training 2 x week   Home/Living environment :  Lives with , 10 year old daughter and puppy   Limitation of Functional Status:   ADLs (difficulty with the following tasks):    - Feeding: toss salad    - Meal Prep:  i    - Bathing: washing hair    - Dressing: i    - Grooming: clip in hair       Self Care / ADL:     IADLs: (difficulty with the following tasks):    - managing finances/ writing: i    - driving/handling transportation: i    - shopping: i    - using phone: i    - computer: [employer]     - managing medications:  i    - housework & basic home maintenance: i    Leisure: birdwatcher binoculars; stained glass; drawing designs for stained glass > precision on right causes increased triggering on left    Environmental Concerns/ Fall Risk:  None   Barriers to Learning:  None   Cultural/Spiritual : None  Developmental/Education: None  Nutritional Deficit: None   Abuse/Neglect : None    Language: None   Hearing/Vision Deficit: nearsighted    Objective:  Edema/ Girth/Volume: Pre-Treatment Girth (cm):     Date: 5/8/18  Left 5/8/18  Right   Wrist 14.8 15.4   mcps 18.8 19.3     Observations:    Sensation Test:  Sensation grossly intact to light touch all dermatomal regions at the time of this evaluation.  However, she does complain of occasionally waking up with numbness in hands.   Stereognosis:  Intact  Houston-Sudhakar Testing:  n/a  Sensitivity: Patient denies numbness & tingling; Temp, touch and pressure sense are intact bilaterally    Palpation:  Skin Condition: unremarkable    Range of Motion: AROM    Manual Muscle Test:  Brachioradialis: 5/5  Brachialis: 5/5  Biceps: 5/5  Triceps: 5/5  Wrist Extension: 5/5  Wrist Flexion: 5/5  Supinator:  5/5  Pronator Teres: 5/5  FDP: 5/5  EPL: 5/5  Dorsal Interossei: 4/5       Special Testing:  Cozen's Test: -  Phalen's Test: -  Durkan's Test: -  Tinel's @ cubital tunnel: -  Tinel's @ carpal tunnel: -  Valgus Instability: -  Varus Instability: -    Coordination:  not impaired for FMC in left  in ADL/IADL's; however, pt does complain of left hand tremoring    9 hole peg test; results in seconds   Right Left    5/8/18 5/8/18   seconds 15.83 17.83     Endurance: not impaired for light ADL/IADL's w/ use of left    Strength: (MARCOS Dynamometer in lbs.), Average 3 trials, Position II             RIGHT           LEFT     Date:   5/8/18 5/8/18     Gross  II Elbow flex 80,80,80# 65,64,62#     Gross  II Elbow ext 84,84,84# 65,66,65#     Lateral Pinch 23,23,22# 20,20,19#     Palmar Pinch/ 3JC 23,26,24# 16,17,16#     Tip Pinch 15,15,15# 9,9,10#     Treatment/ Patient and family/caregiver learning and education:     OT eval performed today and instruction in written HEP including putty strengthening exercises  Patient did require any verbal  cues to perform exercises correctly.  THERAPEUTIC EXERCISES x 15 mins: to increase ROM, increase strength and increase functional use   -issued turquoise putty x 2 with specific exercises related to muscle weakness x 15 reps each    Assessment:   Profile and History Assessment of Occupational Performance Level of Clinical Decision Making Complexity Score   Occupational Profile:   Nathalie Rodriguez is a 47 y.o. female who lives with their family and is currently on disability. Nathalie Rodriguez has difficulty with  Placing hair clip in her hair but independent with everything else  affecting his/her daily functional abilities. His/her main goal for therapy is to avoid losing any more strength.     Medical and Therapy History Review:   Past Medical History:   Diagnosis Date    Allergy     Hypothyroidism     Sleep apnea                   Performance  Deficits    Physical:   Strength  Pinch Strength    Cognitive:  No Deficits    Psychosocial:    No Deficits     Clinical Decision Making:  low    Assessment Process:  Problem-Focused Assessments    Modification/Need for Assistance:  Not Necessary    Intervention Selection:  Limited Treatment Options       low  Based on PMHX, co morbidities , data from assessments and functional level of assistance required with task and clinical presentation directly impacting function.       Medical necessity is demonstrated by the following IMPAIRMENTS/PROBLEMS:  Patient Lack of Knowledge/Awareness in Home Program  Decreased  strength  Decreased pinch strength    Maile presents to occupational therapy with an OT diagnosis of Parkinson's disease and presents with the above listed problem areas.   Based on objective measures, Maile has some  and pinch strength deficits. She is independent with all her functional activities and 9 hole peg test was WNLs on non-dominant left side. We reviewed a detailed home program to address these areas and patient was able to independently demonstrate these while in therapy today.  At this time, Maile does not want to attend therapy but prefers to be educated on exercises that will help her with strengthening of her hand.  She will be discharged with her home program today.    GOALS:  Short Therm Goals: (2 weeks)    STG: Patient will be independent in home exercise and self care program  MET      Pt's spiritual, cultural and educational needs considered and patient is agreeable to plan of care and goals as stated above.     There are no Anticipated Barriers for Occupational  therapy      Plan:     Patient will be discharged today with her HEP.          I certify the need for these services furnished under this plan of treatment and while under my care    ____________________________________                        ______________  Physician/Referring Practitioner                   Date of  Signature

## 2018-05-20 ENCOUNTER — PATIENT MESSAGE (OUTPATIENT)
Dept: ENDOCRINOLOGY | Facility: CLINIC | Age: 48
End: 2018-05-20

## 2018-05-20 DIAGNOSIS — E03.9 HYPOTHYROIDISM, UNSPECIFIED TYPE: Primary | ICD-10-CM

## 2018-05-21 ENCOUNTER — PATIENT MESSAGE (OUTPATIENT)
Dept: NEUROLOGY | Facility: CLINIC | Age: 48
End: 2018-05-21

## 2018-05-21 RX ORDER — LEVOTHYROXINE SODIUM 88 UG/1
88 TABLET ORAL DAILY
Qty: 15 TABLET | Refills: 0 | OUTPATIENT
Start: 2018-05-21 | End: 2019-05-21

## 2018-05-21 RX ORDER — LEVOTHYROXINE SODIUM 88 UG/1
88 TABLET ORAL DAILY
Qty: 90 TABLET | Refills: 1 | OUTPATIENT
Start: 2018-05-21 | End: 2019-05-21

## 2018-05-21 NOTE — TELEPHONE ENCOUNTER
Orders for TSH, Ft4, T3, TPO.   We are not following the ferritin. May want to call PCP to see if they want to add any other labs that they need  Also has been a year since we saw her. Remind her she needs to make an appt so we can continue to refill meds. Patients have to be seen minimum once a year for us to manage their medications

## 2018-05-23 ENCOUNTER — TELEPHONE (OUTPATIENT)
Dept: ENDOCRINOLOGY | Facility: CLINIC | Age: 48
End: 2018-05-23

## 2018-05-23 RX ORDER — LEVOTHYROXINE SODIUM 88 UG/1
88 TABLET ORAL DAILY
Qty: 30 TABLET | Refills: 11 | Status: SHIPPED | OUTPATIENT
Start: 2018-05-23 | End: 2018-05-28

## 2018-05-23 NOTE — TELEPHONE ENCOUNTER
If changing regimen would be best to do labs after being on new dose x 6 weeks.   Stop armour. Can try synthroid 88 mcg once daily

## 2018-05-23 NOTE — TELEPHONE ENCOUNTER
Spoke to pt to adv of Dr Gibbons previous note. She stated she never started the armour thyroid, has only been taking the levothyroxine 88mcg all this time. Spoke with Dr Gibbons and adv of this, he stated to go ahead and cont with labs on 05/24/18. Voiced understanding.

## 2018-05-23 NOTE — TELEPHONE ENCOUNTER
----- Message from Justen Kaur sent at 5/23/2018  8:15 AM CDT -----  Contact: self   Patient need a refill on Synthroid please send to [facility], any questions please call back at [phone] (home)

## 2018-05-24 ENCOUNTER — PATIENT MESSAGE (OUTPATIENT)
Dept: NEUROLOGY | Facility: CLINIC | Age: 48
End: 2018-05-24

## 2018-05-24 ENCOUNTER — LAB VISIT (OUTPATIENT)
Dept: LAB | Facility: HOSPITAL | Age: 48
End: 2018-05-24
Attending: INTERNAL MEDICINE
Payer: COMMERCIAL

## 2018-05-24 DIAGNOSIS — Z00.00 WELL ADULT EXAM: ICD-10-CM

## 2018-05-24 DIAGNOSIS — E06.3 HASHIMOTO'S THYROIDITIS: ICD-10-CM

## 2018-05-24 DIAGNOSIS — R79.0 LOW FERRITIN: ICD-10-CM

## 2018-05-24 DIAGNOSIS — G20.A1 PARKINSON'S DISEASE: ICD-10-CM

## 2018-05-24 DIAGNOSIS — E03.9 HYPOTHYROIDISM, UNSPECIFIED TYPE: ICD-10-CM

## 2018-05-24 DIAGNOSIS — R79.83 HOMOCYSTINEMIA: ICD-10-CM

## 2018-05-24 DIAGNOSIS — E55.9 VITAMIN D DEFICIENCY: ICD-10-CM

## 2018-05-24 LAB
25(OH)D3+25(OH)D2 SERPL-MCNC: 55 NG/ML
ALBUMIN SERPL BCP-MCNC: 3.7 G/DL
ALP SERPL-CCNC: 34 U/L
ALT SERPL W/O P-5'-P-CCNC: 22 U/L
ANION GAP SERPL CALC-SCNC: 5 MMOL/L
AST SERPL-CCNC: 19 U/L
BASOPHILS # BLD AUTO: 0.03 K/UL
BASOPHILS NFR BLD: 0.9 %
BILIRUB SERPL-MCNC: 0.9 MG/DL
BUN SERPL-MCNC: 15 MG/DL
CALCIUM SERPL-MCNC: 9.3 MG/DL
CHLORIDE SERPL-SCNC: 107 MMOL/L
CHOLEST SERPL-MCNC: 176 MG/DL
CHOLEST/HDLC SERPL: 2.3 {RATIO}
CO2 SERPL-SCNC: 27 MMOL/L
CREAT SERPL-MCNC: 0.9 MG/DL
CRP SERPL-MCNC: 0.4 MG/L
DIFFERENTIAL METHOD: ABNORMAL
EOSINOPHIL # BLD AUTO: 0.1 K/UL
EOSINOPHIL NFR BLD: 4 %
ERYTHROCYTE [DISTWIDTH] IN BLOOD BY AUTOMATED COUNT: 13.5 %
EST. GFR  (AFRICAN AMERICAN): >60 ML/MIN/1.73 M^2
EST. GFR  (NON AFRICAN AMERICAN): >60 ML/MIN/1.73 M^2
FERRITIN SERPL-MCNC: 21 NG/ML
GLUCOSE SERPL-MCNC: 92 MG/DL
HCT VFR BLD AUTO: 37.7 %
HCYS SERPL-SCNC: 5.8 UMOL/L
HDLC SERPL-MCNC: 77 MG/DL
HDLC SERPL: 43.8 %
HGB BLD-MCNC: 11.7 G/DL
IMM GRANULOCYTES # BLD AUTO: 0.01 K/UL
IMM GRANULOCYTES NFR BLD AUTO: 0.3 %
LDLC SERPL CALC-MCNC: 87.8 MG/DL
LYMPHOCYTES # BLD AUTO: 1.2 K/UL
LYMPHOCYTES NFR BLD: 36.3 %
MCH RBC QN AUTO: 31.3 PG
MCHC RBC AUTO-ENTMCNC: 31 G/DL
MCV RBC AUTO: 101 FL
MONOCYTES # BLD AUTO: 0.2 K/UL
MONOCYTES NFR BLD: 6.5 %
NEUTROPHILS # BLD AUTO: 1.7 K/UL
NEUTROPHILS NFR BLD: 52 %
NONHDLC SERPL-MCNC: 99 MG/DL
NRBC BLD-RTO: 0 /100 WBC
PLATELET # BLD AUTO: 194 K/UL
PMV BLD AUTO: 9.8 FL
POTASSIUM SERPL-SCNC: 4.4 MMOL/L
PROT SERPL-MCNC: 6.8 G/DL
RBC # BLD AUTO: 3.74 M/UL
SODIUM SERPL-SCNC: 139 MMOL/L
T3 SERPL-MCNC: 63 NG/DL
T4 FREE SERPL-MCNC: 0.84 NG/DL
THYROPEROXIDASE IGG SERPL-ACNC: 404.5 IU/ML
TRIGL SERPL-MCNC: 56 MG/DL
TSH SERPL DL<=0.005 MIU/L-ACNC: 1.41 UIU/ML
WBC # BLD AUTO: 3.22 K/UL

## 2018-05-24 PROCEDURE — 83090 ASSAY OF HOMOCYSTEINE: CPT

## 2018-05-24 PROCEDURE — 84439 ASSAY OF FREE THYROXINE: CPT

## 2018-05-24 PROCEDURE — 85025 COMPLETE CBC W/AUTO DIFF WBC: CPT

## 2018-05-24 PROCEDURE — 36415 COLL VENOUS BLD VENIPUNCTURE: CPT | Mod: PO

## 2018-05-24 PROCEDURE — 80061 LIPID PANEL: CPT

## 2018-05-24 PROCEDURE — 80053 COMPREHEN METABOLIC PANEL: CPT

## 2018-05-24 PROCEDURE — 84443 ASSAY THYROID STIM HORMONE: CPT

## 2018-05-24 PROCEDURE — 86140 C-REACTIVE PROTEIN: CPT

## 2018-05-24 PROCEDURE — 82728 ASSAY OF FERRITIN: CPT

## 2018-05-24 PROCEDURE — 86376 MICROSOMAL ANTIBODY EACH: CPT

## 2018-05-24 PROCEDURE — 84480 ASSAY TRIIODOTHYRONINE (T3): CPT

## 2018-05-24 PROCEDURE — 82306 VITAMIN D 25 HYDROXY: CPT

## 2018-05-25 ENCOUNTER — PATIENT MESSAGE (OUTPATIENT)
Dept: NEUROLOGY | Facility: CLINIC | Age: 48
End: 2018-05-25

## 2018-05-28 RX ORDER — LEVOTHYROXINE SODIUM 88 UG/1
88 TABLET ORAL DAILY
Qty: 90 TABLET | Refills: 3 | Status: SHIPPED | OUTPATIENT
Start: 2018-05-28 | End: 2018-06-22 | Stop reason: SDUPTHER

## 2018-05-28 NOTE — TELEPHONE ENCOUNTER
I dont see that pharmacy listed to e-scribe. Can you call:  Synthroid 88 mcg once daily. Dispense 90 with 3 refills.

## 2018-06-01 ENCOUNTER — PATIENT MESSAGE (OUTPATIENT)
Dept: NEUROLOGY | Facility: CLINIC | Age: 48
End: 2018-06-01

## 2018-06-01 ENCOUNTER — PATIENT MESSAGE (OUTPATIENT)
Dept: PHYSICAL MEDICINE AND REHAB | Facility: CLINIC | Age: 48
End: 2018-06-01

## 2018-06-05 ENCOUNTER — TELEPHONE (OUTPATIENT)
Dept: VASCULAR SURGERY | Facility: CLINIC | Age: 48
End: 2018-06-05

## 2018-06-05 ENCOUNTER — PATIENT MESSAGE (OUTPATIENT)
Dept: NEUROLOGY | Facility: CLINIC | Age: 48
End: 2018-06-05

## 2018-06-05 DIAGNOSIS — G24.8 HEMIDYSTONIA: Primary | ICD-10-CM

## 2018-06-05 NOTE — TELEPHONE ENCOUNTER
----- Message from Kira Jurado sent at 6/5/2018  1:10 PM CDT -----  Type:  Patient Returning Call    Who Called:  Patient   Who Left Message for Patient:  Candy  Does the patient know what this is regarding?:  Appointment information  Best Call Back Number:  [phone]  Additional Information:

## 2018-06-05 NOTE — TELEPHONE ENCOUNTER
Pt instructed to call [facility] medicine for eval for Thoracic outlet syndrome.

## 2018-06-07 ENCOUNTER — TELEPHONE (OUTPATIENT)
Dept: NEUROLOGY | Facility: CLINIC | Age: 48
End: 2018-06-07

## 2018-06-07 ENCOUNTER — PATIENT MESSAGE (OUTPATIENT)
Dept: NEUROLOGY | Facility: CLINIC | Age: 48
End: 2018-06-07

## 2018-06-07 ENCOUNTER — PROCEDURE VISIT (OUTPATIENT)
Dept: NEUROLOGY | Facility: CLINIC | Age: 48
End: 2018-06-07
Payer: COMMERCIAL

## 2018-06-07 VITALS
SYSTOLIC BLOOD PRESSURE: 110 MMHG | DIASTOLIC BLOOD PRESSURE: 69 MMHG | WEIGHT: 116.63 LBS | HEIGHT: 63 IN | HEART RATE: 78 BPM | BODY MASS INDEX: 20.66 KG/M2

## 2018-06-07 DIAGNOSIS — G24.8 LIMB DYSTONIA: Primary | ICD-10-CM

## 2018-06-07 PROCEDURE — 64642 CHEMODENERV 1 EXTREMITY 1-4: CPT | Mod: S$GLB,,, | Performed by: PSYCHIATRY & NEUROLOGY

## 2018-06-07 RX ORDER — RASAGILINE 0.5 MG/1
TABLET ORAL
COMMUNITY
Start: 2018-05-29 | End: 2018-06-12

## 2018-06-07 NOTE — PROCEDURES
"Procedures   Clinic Documentation for botulinum Injection   Nathalie Rodriguez   1970       Nathalie Rodriguez was seen in clinic today for botulinum injections for the treatment of:  1. Limb dystonia        Botulinum injections are medically necessary for this patient, due to discomfort and effect on the patient's quality of life, causing social embarrassment.    Anticholinergics have not been effective.     Last injections (outside provider) were not effective.  3, 4, 5th digits on left curl under.    PHYSICAL EXAM (FOCUSED):    Vitals:    06/07/18 1255   BP: 110/69   Pulse: 78   Weight: 52.9 kg (116 lb 10 oz)   Height: 5' 3" (1.6 m)       healthy, alert, no distress  Left foot, 3rd, 4th and 5th digits are flexed down.    ASSESSMENT:  Left foot dystonia, appropriate for re-injections.    PLAN/PROCEDURE:  After explaining the most frequently reported adverse reactions in patients with siallorhea, I answered all the patient's questions and consent was obtained.       Two patient identifiers were confirmed with the patient prior to performing this procedure. A time out to determine correct patient and and agreement on procedure performed was conducted prior to the procedure.     The goal of the injections will be to reduce spasms.   Using a 30 gauge injection needle and aseptic technique the following muscles were identified and injected with BOTOX.     BOTULINUM  INJECTION PROCEDURE:   Dilution: 100 units in 1cc normal saline    For foot dystonia:  I injected a total of 90 units total of botox into left intrinsics of 3,4,5 toes.          Patient supplied botulinum toxin?  no  Total amount of toxin used:  90 units  Total amount of toxin wasted:  10 units    "

## 2018-06-07 NOTE — TELEPHONE ENCOUNTER
----- Message from Jes Lloyd sent at 6/6/2018  6:08 PM CDT -----  Contact: Self   Pt is requesting a call back in regards to scheduling an appt to be screened for thoracic outlet syndrome.       Pt can be contacted at [phone]

## 2018-06-20 ENCOUNTER — HOSPITAL ENCOUNTER (OUTPATIENT)
Dept: RADIOLOGY | Facility: HOSPITAL | Age: 48
Discharge: HOME | End: 2018-06-20
Attending: PSYCHIATRY & NEUROLOGY
Payer: COMMERCIAL

## 2018-06-20 DIAGNOSIS — G24.8 HEMIDYSTONIA: ICD-10-CM

## 2018-06-20 PROCEDURE — 93886 INTRACRANIAL COMPLETE STUDY: CPT | Mod: TC

## 2018-06-20 PROCEDURE — 93886 INTRACRANIAL COMPLETE STUDY: CPT | Mod: 26,,, | Performed by: RADIOLOGY

## 2018-06-22 ENCOUNTER — PATIENT MESSAGE (OUTPATIENT)
Dept: ENDOCRINOLOGY | Facility: CLINIC | Age: 48
End: 2018-06-22

## 2018-06-22 RX ORDER — LEVOTHYROXINE SODIUM 88 UG/1
88 TABLET ORAL DAILY
Qty: 90 TABLET | Refills: 3 | Status: SHIPPED | OUTPATIENT
Start: 2018-06-22 | End: 2018-08-02

## 2018-07-18 ENCOUNTER — PATIENT MESSAGE (OUTPATIENT)
Dept: NEUROLOGY | Facility: CLINIC | Age: 48
End: 2018-07-18

## 2018-07-18 DIAGNOSIS — R25.1 TREMOR: Primary | ICD-10-CM

## 2018-07-20 ENCOUNTER — PATIENT MESSAGE (OUTPATIENT)
Dept: NEUROLOGY | Facility: CLINIC | Age: 48
End: 2018-07-20

## 2018-07-20 RX ORDER — PROPRANOLOL HYDROCHLORIDE 20 MG/1
10-20 TABLET ORAL 3 TIMES DAILY PRN
Qty: 90 TABLET | Refills: 11 | Status: SHIPPED | OUTPATIENT
Start: 2018-07-20 | End: 2018-10-16

## 2018-08-01 ENCOUNTER — PATIENT MESSAGE (OUTPATIENT)
Dept: NEUROLOGY | Facility: CLINIC | Age: 48
End: 2018-08-01

## 2018-08-02 ENCOUNTER — OFFICE VISIT (OUTPATIENT)
Dept: NEUROLOGY | Facility: CLINIC | Age: 48
End: 2018-08-02
Payer: COMMERCIAL

## 2018-08-02 VITALS
HEIGHT: 63 IN | SYSTOLIC BLOOD PRESSURE: 95 MMHG | DIASTOLIC BLOOD PRESSURE: 63 MMHG | BODY MASS INDEX: 20.86 KG/M2 | WEIGHT: 117.75 LBS | HEART RATE: 66 BPM

## 2018-08-02 DIAGNOSIS — G56.22 ULNAR NEUROPATHY OF LEFT UPPER EXTREMITY: Primary | ICD-10-CM

## 2018-08-02 PROCEDURE — 3008F BODY MASS INDEX DOCD: CPT | Mod: CPTII,S$GLB,, | Performed by: PSYCHIATRY & NEUROLOGY

## 2018-08-02 PROCEDURE — 99999 PR PBB SHADOW E&M-EST. PATIENT-LVL III: CPT | Mod: PBBFAC,,, | Performed by: PSYCHIATRY & NEUROLOGY

## 2018-08-02 PROCEDURE — 99214 OFFICE O/P EST MOD 30 MIN: CPT | Mod: S$GLB,,, | Performed by: PSYCHIATRY & NEUROLOGY

## 2018-08-02 RX ORDER — ESTRADIOL 0.03 MG/D
1 FILM, EXTENDED RELEASE TRANSDERMAL
Refills: 2 | COMMUNITY
Start: 2018-07-23 | End: 2018-10-16

## 2018-08-02 RX ORDER — CHOLECALCIFEROL (VITAMIN D3) 125 MCG/ML
5000 DROPS ORAL
COMMUNITY

## 2018-08-02 NOTE — PROGRESS NOTES
Patient Name: Nathalie Rodriguez  MRN: [id number]    CC: numbness in fingers on left hand    HPI: Nathalie Rodriguez is a 47 y.o. female who sees Dr Leonardo for PD/limb dystonia who is here to discuss whether she has thoracic outlet syndrome on the left side. Symptoms include bilateral, intermittent numbness in digits 4-5 bilaterally at night when she's sleeping. Sleeps with arms flexed. No weakness or shoulder pain noted.          Review of Systems   Constitutional: Negative for activity change, appetite change, chills, diaphoresis, fatigue, fever and unexpected weight change.   HENT: Negative for congestion, drooling, trouble swallowing and voice change.    Eyes: Negative for photophobia and visual disturbance.   Respiratory: Negative for choking, shortness of breath and stridor.    Cardiovascular: Negative for chest pain and leg swelling.   Gastrointestinal: Negative for abdominal pain, constipation, diarrhea, nausea and vomiting.   Endocrine: Negative for cold intolerance and heat intolerance.   Genitourinary: Negative for difficulty urinating, dysuria and urgency.   Musculoskeletal: Negative for back pain, gait problem, myalgias, neck pain and neck stiffness.   Skin: Negative for color change and rash.   Allergic/Immunologic: Negative for environmental allergies and food allergies.   Neurological: Positive for tremors and numbness. Negative for dizziness, facial asymmetry, speech difficulty, weakness and headaches.   Psychiatric/Behavioral: Negative for confusion, decreased concentration and hallucinations. The patient is not nervous/anxious.        Past Medical History  Past Medical History:   Diagnosis Date    Allergy     Hypothyroidism     Sleep apnea        Medications    Current Outpatient Prescriptions:     cholecalciferol, vitamin D3, 5,000 unit/mL Drop, Take by mouth., Disp: , Rfl:     cyanocobalamin 1,000 mcg/mL injection, Inject 1 mL (1,000 mcg total) into the muscle every 28 days., Disp: 10 mL, Rfl:  "1    liothyronine (CYTOMEL) 5 MCG Tab, TAKE 1 TABLET BY MOUTH TWICE DAILY (Patient taking differently: TAKE 1 TABLET BY MOUTH ONCE DAILY), Disp: 60 tablet, Rfl: 2    meloxicam (MOBIC) 15 MG tablet, TAKE 1 TABLET BY MOUTH ONCE DAILY AS NEEDED FOR KNEE PAIN, Disp: 90 tablet, Rfl: 0    MINIVELLE 0.025 mg/24 hr, Place 1 patch onto the skin twice a week., Disp: , Rfl: 2    NATURE-THROID 65 mg Tab, 65 mg., Disp: , Rfl: 0    progesterone (PROMETRIUM) 100 MG capsule, Take 50 mg by mouth once daily. , Disp: , Rfl:     propranolol (INDERAL) 20 MG tablet, Take 0.5-1 tablets (10-20 mg total) by mouth 3 (three) times daily as needed (tremor)., Disp: 90 tablet, Rfl: 11    Current Facility-Administered Medications:     onabotulinumtoxina injection 100 Units, 100 Units, Intramuscular, Q90 Days, Dariela Leonardo MD, 100 Units at 06/07/18 1312    Allergies  Review of patient's allergies indicates:   Allergen Reactions    Codeine     Hydrocodone     Sulfa (sulfonamide antibiotics)        Social History  Social History     Social History    Marital status:      Spouse name: N/A    Number of children: N/A    Years of education: N/A     Occupational History    Not on file.     Social History Main Topics    Smoking status: Never Smoker    Smokeless tobacco: Never Used    Alcohol use 0.0 oz/week      Comment: occasional    Drug use: No    Sexual activity: Not on file     Other Topics Concern    Not on file     Social History Narrative    No narrative on file       Family History  Family History   Problem Relation Age of Onset    Parkinsonism Father     Cancer Maternal Aunt         breast    Breast cancer Maternal Aunt     Ovarian cancer Maternal Aunt        Physical Exam  BP 95/63   Pulse 66   Ht 5' 3" (1.6 m)   Wt 53.4 kg (117 lb 11.6 oz)   BMI 20.85 kg/m²     General appearance: Well-developed, well-groomed.     Neurologic Exam: The patient is awake, alert and oriented. Language is fluent. Recent and " remote memory are normal. Attention span and concentration are normal. Fund of knowledge is appropriate.     Cranial nerves: pupils are round and reactive to light and accommodation. Ocular motility is full in all cardinal positions of gaze. Facial activation is symmetric. Hearing is normal bilaterally. Palate elevates symmetrically. Shoulder elevation is symmetric and full strength bilaterally. Tongue is midline and neck rotation strength is normal bilaterally. Neck range of motion is normal.     Motor examination of all extremities demonstrates normal bulk. There are no atrophy or fasciculations. Strength is 5/5 in the upper and lower extremities bilaterally except for trace weakens in the left ADQM. No atrophy.     Sensory examination is normal to pinprick, vibration and proprioception in the upper and lower extremities bilaterally. Romberg is negative.    Deep tendon reflexes are 2+ and symmetric in the upper and lower extremities bilaterally. Toes are mute bilaterally.     Gait: Normal casual gait.    Coordination: Left UE resting tremor noted.         Lab and Test Results    WBC   Date Value Ref Range Status   05/24/2018 3.22 (L) 3.90 - 12.70 K/uL Final   04/08/2016 4.11 3.90 - 12.70 K/uL Final   01/30/2006 4.50 (L) 4.8 - 10.8 K/uL Final     Hemoglobin   Date Value Ref Range Status   05/24/2018 11.7 (L) 12.0 - 16.0 g/dL Final   04/08/2016 12.1 12.0 - 16.0 g/dL Final   01/30/2006 11.1 (L) 12.0 - 16.0 gm/dl Final     Hematocrit   Date Value Ref Range Status   05/24/2018 37.7 37.0 - 48.5 % Final   04/08/2016 36.7 (L) 37.0 - 48.5 % Final   01/30/2006 34.7 (L) 37.0 - 48.5 % Final     Platelets   Date Value Ref Range Status   05/24/2018 194 150 - 350 K/uL Final   04/08/2016 241 150 - 350 K/uL Final   01/30/2006 282 150 - 350 K/uL Final     Glucose   Date Value Ref Range Status   05/24/2018 92 70 - 110 mg/dL Final   05/24/2016 161 (H) 70 - 110 mg/dL Final   01/30/2006 98 70 - 110 mg/dl Final     Sodium   Date Value  Ref Range Status   05/24/2018 139 136 - 145 mmol/L Final   05/24/2016 139 136 - 145 mmol/L Final   01/30/2006 143 136 - 145 mMol/l Final     Potassium   Date Value Ref Range Status   05/24/2018 4.4 3.5 - 5.1 mmol/L Final   05/24/2016 3.7 3.5 - 5.1 mmol/L Final   01/30/2006 4.2 3.3 - 5.3 mMol/l Final     Chloride   Date Value Ref Range Status   05/24/2018 107 95 - 110 mmol/L Final   05/24/2016 106 95 - 110 mmol/L Final   01/30/2006 106 95 - 110 mMol/l Final     CO2   Date Value Ref Range Status   05/24/2018 27 23 - 29 mmol/L Final   05/24/2016 24 23 - 29 mmol/L Final   01/30/2006 25 23.0 - 29.0 mEq/L Final     BUN, Bld   Date Value Ref Range Status   05/24/2018 15 6 - 20 mg/dL Final   05/24/2016 7 6 - 20 mg/dL Final   01/30/2006 10 5 - 23 mg/dl Final     Creatinine   Date Value Ref Range Status   05/24/2018 0.9 0.5 - 1.4 mg/dL Final   05/24/2016 0.8 0.5 - 1.4 mg/dL Final   01/30/2006 0.8 0.5 - 1.4 mg/dl Final     Calcium   Date Value Ref Range Status   05/24/2018 9.3 8.7 - 10.5 mg/dL Final   05/24/2016 9.3 8.7 - 10.5 mg/dL Final   01/30/2006 9.5 8.7 - 10.5 mg/dl Final     Alkaline Phosphatase   Date Value Ref Range Status   05/24/2018 34 (L) 55 - 135 U/L Final   05/24/2016 41 (L) 55 - 135 U/L Final   01/30/2006 38 (L) 45 - 130 U/L Final     ALT   Date Value Ref Range Status   05/24/2018 22 10 - 44 U/L Final   05/24/2016 17 10 - 44 U/L Final   01/30/2006 10 0 - 31 U/L Final     AST   Date Value Ref Range Status   05/24/2018 19 10 - 40 U/L Final   05/24/2016 16 10 - 40 U/L Final   01/30/2006 14 0 - 31 U/L Final       Assessment and Plan    Problem List Items Addressed This Visit     Ulnar neuropathy of left upper extremity - Primary    Current Assessment & Plan     Mild left ulnar neuropathy. Discussed strategies around reducing stress across elbow, including bracing at night, not resting elbow on hard surfaces, etc. Since symptoms are so mild, she is not interested in intervention or investigations at this time. Will  monitor. Can investigate further if symptoms worsen or new symptoms arise             Roby Shen MD, DELPIHNE, FAAN  Department of Neurology  [facility] [place] [address]

## 2018-08-03 PROBLEM — G56.22 ULNAR NEUROPATHY OF LEFT UPPER EXTREMITY: Status: ACTIVE | Noted: 2018-08-03

## 2018-08-03 NOTE — ASSESSMENT & PLAN NOTE
Mild left ulnar neuropathy. Discussed strategies around reducing stress across elbow, including bracing at night, not resting elbow on hard surfaces, etc. Since symptoms are so mild, she is not interested in intervention or investigations at this time. Will monitor. Can investigate further if symptoms worsen or new symptoms arise

## 2018-08-28 DIAGNOSIS — G24.8 LIMB DYSTONIA: Primary | ICD-10-CM

## 2018-08-30 ENCOUNTER — TELEPHONE (OUTPATIENT)
Dept: NEUROLOGY | Facility: CLINIC | Age: 48
End: 2018-08-30

## 2018-08-30 NOTE — TELEPHONE ENCOUNTER
----- Message from Susanne Mendez sent at 8/30/2018  3:39 PM CDT -----  Contact: pt @ [phone]  Calling to speak with someone in Dr. Leonardo's office regarding canceling/rescheduling her appt on 9/5. Please call before canceling.

## 2018-09-04 ENCOUNTER — PATIENT MESSAGE (OUTPATIENT)
Dept: NEUROLOGY | Facility: CLINIC | Age: 48
End: 2018-09-04

## 2018-09-04 ENCOUNTER — TELEPHONE (OUTPATIENT)
Dept: NEUROLOGY | Facility: CLINIC | Age: 48
End: 2018-09-04

## 2018-09-04 NOTE — TELEPHONE ENCOUNTER
----- Message from Alejandro Herrera sent at 9/4/2018 11:20 AM CDT -----  Contact: Patient @ [phone]  Patient is calling to r/s the 9-5th appt, donn call

## 2018-09-20 ENCOUNTER — PATIENT MESSAGE (OUTPATIENT)
Dept: NEUROLOGY | Facility: CLINIC | Age: 48
End: 2018-09-20

## 2018-09-25 ENCOUNTER — PATIENT MESSAGE (OUTPATIENT)
Dept: NEUROLOGY | Facility: CLINIC | Age: 48
End: 2018-09-25

## 2018-10-01 ENCOUNTER — TELEPHONE (OUTPATIENT)
Dept: NEUROLOGY | Facility: CLINIC | Age: 48
End: 2018-10-01

## 2018-10-01 NOTE — TELEPHONE ENCOUNTER
----- Message from Corey Doll sent at 10/1/2018  2:31 PM CDT -----  Needs Advice    Reason for call: Pt is calling to reschedule botox appt on 10/08 to a later date, a month later        Communication Preference: [phone]    Additional Information:

## 2018-10-02 ENCOUNTER — PATIENT MESSAGE (OUTPATIENT)
Dept: NEUROLOGY | Facility: CLINIC | Age: 48
End: 2018-10-02

## 2018-10-02 ENCOUNTER — TELEPHONE (OUTPATIENT)
Dept: NEUROLOGY | Facility: CLINIC | Age: 48
End: 2018-10-02

## 2018-10-02 NOTE — TELEPHONE ENCOUNTER
----- Message from Susanne Mendez sent at 10/2/2018  4:56 PM CDT -----  Patient Returning Call from [facility]    Who Left Message for Patient:Jessy  Communication Preference:pt @ [phone]  Additional Information:

## 2018-10-03 ENCOUNTER — PATIENT MESSAGE (OUTPATIENT)
Dept: ENDOCRINOLOGY | Facility: CLINIC | Age: 48
End: 2018-10-03

## 2018-10-03 DIAGNOSIS — E03.9 HYPOTHYROIDISM, UNSPECIFIED TYPE: Primary | ICD-10-CM

## 2018-10-03 RX ORDER — LEVOTHYROXINE SODIUM 100 UG/1
100 TABLET ORAL DAILY
Qty: 30 TABLET | Refills: 3 | Status: SHIPPED | OUTPATIENT
Start: 2018-10-03 | End: 2018-12-19

## 2018-10-03 NOTE — TELEPHONE ENCOUNTER
Pt already has an appt on 11/29/18; can she keep this appt with labs 1-2 weeks prior?  Is this soon enough or should she come in at 4 weeks with labs?    Please advise

## 2018-10-03 NOTE — TELEPHONE ENCOUNTER
Returned call to patient to reschedule appointment. Appointment scheduled for 12/7/2018 at 1:20 PM.  Patient states this will be her last time rescheduling. She feels she does not need the Botox injections yet. Advised patient she does not need to keep rescheduling, but she can call when she feels  She needs the injections, preferably 2 weeks out to allow for approval. Patient agreed and verbalized understanding. Advised will inform Dr. Leonardo.

## 2018-10-03 NOTE — TELEPHONE ENCOUNTER
Let her know with hormone replacement therapy, there may be a lightly higher requirement for thyroid hormone replacement although TSH should not go that high. Would recommend if on 88 mcg before, to try 100 mcg daily. The u;trasound will only tell you the thyroid anatomy but not answer any question about your thyroid levels.   Lets put you on the synthroid 100 mcg once daily. folowup with me in 4 weeks with TSH, FT4, T3- ok to double book or use urgent spot

## 2018-10-04 ENCOUNTER — PATIENT MESSAGE (OUTPATIENT)
Dept: ENDOCRINOLOGY | Facility: CLINIC | Age: 48
End: 2018-10-04

## 2018-10-09 ENCOUNTER — TELEPHONE (OUTPATIENT)
Dept: PSYCHIATRY | Facility: CLINIC | Age: 48
End: 2018-10-09

## 2018-10-09 NOTE — TELEPHONE ENCOUNTER
Called and spoke to patient and explained not able to take new patients at this time and let her know will be getting a NP provider in Nov or Dec and she stated she will call and research some others on the [place] and will call if cannot get anyone before Nov or Dec and she will check to get with new provider here if cannot find anyone sooner.

## 2018-10-09 NOTE — TELEPHONE ENCOUNTER
----- Message from Marleen Arriaga sent at 10/9/2018  2:14 PM CDT -----  Contact: self   Pt is request a message be sent. Pt would like to schedule a new patient appt.    Pt would like a call back at [phone].    Thank you

## 2018-10-12 ENCOUNTER — PATIENT MESSAGE (OUTPATIENT)
Dept: NEUROLOGY | Facility: CLINIC | Age: 48
End: 2018-10-12

## 2018-10-12 DIAGNOSIS — F41.9 ANXIETY: Primary | ICD-10-CM

## 2018-10-12 RX ORDER — MIRTAZAPINE 7.5 MG/1
7.5 TABLET, FILM COATED ORAL NIGHTLY
Qty: 30 TABLET | Refills: 11 | Status: SHIPPED | OUTPATIENT
Start: 2018-10-12 | End: 2018-10-26

## 2018-10-16 DIAGNOSIS — M25.552 LEFT HIP PAIN: Primary | ICD-10-CM

## 2018-10-17 ENCOUNTER — HOSPITAL ENCOUNTER (OUTPATIENT)
Dept: RADIOLOGY | Facility: HOSPITAL | Age: 48
Discharge: HOME | End: 2018-10-17
Attending: ORTHOPAEDIC SURGERY
Payer: COMMERCIAL

## 2018-10-17 ENCOUNTER — OFFICE VISIT (OUTPATIENT)
Dept: ORTHOPEDICS | Facility: CLINIC | Age: 48
End: 2018-10-17
Payer: COMMERCIAL

## 2018-10-17 VITALS
DIASTOLIC BLOOD PRESSURE: 56 MMHG | HEART RATE: 68 BPM | HEIGHT: 63 IN | SYSTOLIC BLOOD PRESSURE: 103 MMHG | BODY MASS INDEX: 21.09 KG/M2 | WEIGHT: 119 LBS

## 2018-10-17 DIAGNOSIS — M76.32 IT BAND SYNDROME, LEFT: Primary | ICD-10-CM

## 2018-10-17 DIAGNOSIS — M25.552 LEFT HIP PAIN: ICD-10-CM

## 2018-10-17 PROCEDURE — 73502 X-RAY EXAM HIP UNI 2-3 VIEWS: CPT | Mod: 26,LT,, | Performed by: RADIOLOGY

## 2018-10-17 PROCEDURE — 99999 PR PBB SHADOW E&M-EST. PATIENT-LVL III: CPT | Mod: PBBFAC,,, | Performed by: ORTHOPAEDIC SURGERY

## 2018-10-17 PROCEDURE — 99243 OFF/OP CNSLTJ NEW/EST LOW 30: CPT | Mod: S$GLB,,, | Performed by: ORTHOPAEDIC SURGERY

## 2018-10-17 PROCEDURE — 73502 X-RAY EXAM HIP UNI 2-3 VIEWS: CPT | Mod: TC,PO,LT

## 2018-10-17 NOTE — LETTER
October 17, 2018      Osito Sykes, DO  [address]  [address]  [place] [address]           [facility]  [address] [facility] [address] [place] [address]  Phone: [phone]          Patient: Nathalie Rodriguez   MR Number: [id number]   YOB: 1970   Date of Visit: 10/17/2018       Dear Dr. Osito Sykes:    Thank you for referring Nathalie Rodriguez to me for evaluation. Attached you will find relevant portions of my assessment and plan of care.    If you have questions, please do not hesitate to call me. I look forward to following Nathalie Rodriguez along with you.    Sincerely,    Shmuel Jones MD    Enclosure  CC:  No Recipients    If you would like to receive this communication electronically, please contact [email or web address] or [phone] to request more information on [employer] Link access.    For providers and/or their staff who would like to refer a patient to [facility], please contact us through our one-stop-shop provider referral line, Kat Newberry, at [phone].    If you feel you have received this communication in error or would no longer like to receive these types of communications, please e-mail [email or web address]

## 2018-10-25 ENCOUNTER — PATIENT MESSAGE (OUTPATIENT)
Dept: NEUROLOGY | Facility: CLINIC | Age: 48
End: 2018-10-25

## 2018-10-25 DIAGNOSIS — F41.9 ANXIETY: ICD-10-CM

## 2018-10-26 ENCOUNTER — PATIENT MESSAGE (OUTPATIENT)
Dept: NEUROLOGY | Facility: CLINIC | Age: 48
End: 2018-10-26

## 2018-10-26 RX ORDER — MIRTAZAPINE 15 MG/1
7.5 TABLET, FILM COATED ORAL NIGHTLY
Qty: 15 TABLET | Refills: 11 | Status: SHIPPED | OUTPATIENT
Start: 2018-10-26 | End: 2018-11-28

## 2018-11-20 ENCOUNTER — LAB VISIT (OUTPATIENT)
Dept: LAB | Facility: HOSPITAL | Age: 48
End: 2018-11-20
Attending: INTERNAL MEDICINE
Payer: COMMERCIAL

## 2018-11-20 DIAGNOSIS — E03.9 HYPOTHYROIDISM, UNSPECIFIED TYPE: ICD-10-CM

## 2018-11-20 LAB
T3 SERPL-MCNC: 59 NG/DL
T4 FREE SERPL-MCNC: 0.84 NG/DL
TSH SERPL DL<=0.005 MIU/L-ACNC: 8.34 UIU/ML

## 2018-11-20 PROCEDURE — 36415 COLL VENOUS BLD VENIPUNCTURE: CPT | Mod: PO

## 2018-11-20 PROCEDURE — 84439 ASSAY OF FREE THYROXINE: CPT

## 2018-11-20 PROCEDURE — 84480 ASSAY TRIIODOTHYRONINE (T3): CPT

## 2018-11-20 PROCEDURE — 84443 ASSAY THYROID STIM HORMONE: CPT

## 2018-11-27 ENCOUNTER — PATIENT MESSAGE (OUTPATIENT)
Dept: ENDOCRINOLOGY | Facility: CLINIC | Age: 48
End: 2018-11-27

## 2018-11-27 ENCOUNTER — PATIENT MESSAGE (OUTPATIENT)
Dept: NEUROLOGY | Facility: CLINIC | Age: 48
End: 2018-11-27

## 2018-11-27 DIAGNOSIS — F51.04 PSYCHOPHYSIOLOGICAL INSOMNIA: Primary | ICD-10-CM

## 2018-11-28 RX ORDER — CLONAZEPAM 0.25 MG/1
0.25 TABLET, ORALLY DISINTEGRATING ORAL NIGHTLY PRN
Qty: 30 TABLET | Refills: 5 | Status: SHIPPED | OUTPATIENT
Start: 2018-11-28 | End: 2019-01-11

## 2018-12-03 ENCOUNTER — OFFICE VISIT (OUTPATIENT)
Dept: ALLERGY | Facility: CLINIC | Age: 48
End: 2018-12-03
Payer: COMMERCIAL

## 2018-12-03 VITALS — OXYGEN SATURATION: 98 % | BODY MASS INDEX: 21.76 KG/M2 | HEART RATE: 80 BPM | HEIGHT: 63 IN | WEIGHT: 122.81 LBS

## 2018-12-03 DIAGNOSIS — J30.89 SEASONAL ALLERGIC RHINITIS DUE TO OTHER ALLERGIC TRIGGER: ICD-10-CM

## 2018-12-03 DIAGNOSIS — R10.84 GENERALIZED ABDOMINAL PAIN: Primary | ICD-10-CM

## 2018-12-03 DIAGNOSIS — Z86.39 HISTORY OF HASHIMOTO THYROIDITIS: ICD-10-CM

## 2018-12-03 PROCEDURE — 99999 PR PBB SHADOW E&M-EST. PATIENT-LVL III: CPT | Mod: PBBFAC,,, | Performed by: ALLERGY & IMMUNOLOGY

## 2018-12-03 PROCEDURE — 99204 OFFICE O/P NEW MOD 45 MIN: CPT | Mod: S$GLB,,, | Performed by: ALLERGY & IMMUNOLOGY

## 2018-12-03 PROCEDURE — 3008F BODY MASS INDEX DOCD: CPT | Mod: CPTII,S$GLB,, | Performed by: ALLERGY & IMMUNOLOGY

## 2018-12-03 NOTE — PROGRESS NOTES
Subjective:       Patient ID: Nathalie Rodriguez is a 48 y.o. female.    Chief Complaint:  Allergy Testing (suspects food allergies, gastro type symptoms.)      47 yo woman presents for new patient evaluation of possible food reactions. She states for years she has had IBS with bloating, gas, abdominal pain off and in. 10 years ago was bad enough was supposed to have scope but then found out she was pregnant so never did. She has done gluten free diet for past year and does help, less bloat, gas. She also finds dairy to be trigger but she does eat some cheese and is ok. She has had Hashimoto's since 20's and recently diagnosed with Parkinson's so is worried there is food causing inflammation. She has neve had H vies, swelling, SOB with any food ingestion. She does have seasonal sneeze, runny nose congestion and would get sinus infections from it. However since gluten free that also seems better. She has no asthma or eczema. No latex allergy. She gets large local reactions to insect bite but no systemic. She takes allegra or zyrtec prn but not for awhile. no sinus surgery.         Environmental History: see history section for home environment  Review of Systems   Constitutional: Negative for appetite change, chills, fatigue and fever.   HENT: Positive for congestion, rhinorrhea and sneezing. Negative for ear discharge, ear pain, facial swelling, nosebleeds, postnasal drip, sinus pressure, sore throat, trouble swallowing and voice change.    Eyes: Negative for discharge, redness, itching and visual disturbance.   Respiratory: Negative for cough, choking, chest tightness, shortness of breath and wheezing.    Cardiovascular: Negative for chest pain, palpitations and leg swelling.   Gastrointestinal: Positive for abdominal distention, abdominal pain and nausea. Negative for constipation, diarrhea and vomiting.   Genitourinary: Negative for difficulty urinating.   Musculoskeletal: Positive for arthralgias. Negative for  gait problem, joint swelling and myalgias.   Skin: Negative for color change and rash.   Neurological: Negative for dizziness, syncope, weakness, light-headedness and headaches.   Hematological: Negative for adenopathy. Does not bruise/bleed easily.   Psychiatric/Behavioral: Negative for agitation, behavioral problems, confusion and sleep disturbance. The patient is not nervous/anxious.         Objective:      Physical Exam   Constitutional: She is oriented to person, place, and time. She appears well-developed and well-nourished. No distress.   HENT:   Head: Normocephalic and atraumatic.   Right Ear: Hearing, tympanic membrane, external ear and ear canal normal.   Left Ear: Hearing, tympanic membrane, external ear and ear canal normal.   Nose: No mucosal edema, rhinorrhea, sinus tenderness or septal deviation. No epistaxis. Right sinus exhibits no maxillary sinus tenderness and no frontal sinus tenderness. Left sinus exhibits no maxillary sinus tenderness and no frontal sinus tenderness.   Mouth/Throat: Uvula is midline, oropharynx is clear and moist and mucous membranes are normal. No uvula swelling.   Eyes: Conjunctivae are normal. Right eye exhibits no discharge. Left eye exhibits no discharge.   Neck: Normal range of motion. No thyromegaly present.   Cardiovascular: Normal rate, regular rhythm and normal heart sounds.   No murmur heard.  Pulmonary/Chest: Effort normal and breath sounds normal. No respiratory distress. She has no wheezes.   Abdominal: Soft. She exhibits no distension. There is no tenderness.   Musculoskeletal: Normal range of motion. She exhibits no edema or tenderness.   Lymphadenopathy:     She has no cervical adenopathy.   Neurological: She is alert and oriented to person, place, and time.   Skin: Skin is warm and dry. No rash noted. No erythema.   Psychiatric: She has a normal mood and affect. Her behavior is normal. Judgment and thought content normal.   Nursing note and vitals  reviewed.      Laboratory:   none performed   Assessment:       1. Generalized abdominal pain    2. Seasonal allergic rhinitis due to other allergic trigger    3. History of Hashimoto thyroiditis         Plan:       1. discussed food reactions with pt advised you can have IgE mediated allergy vs intolerance/sensitivity. Discussed symptoms of IgE allergy and that she lacks those. Advised has more intolerances. Also discussed no identified like between food and inflammation but more processed foods and additives may cause worse arthralgias and GI symptoms. Advised to continue her current diet, can do food journal to see if any other sensitivities  2. Refer to rheumatology as she has Hashimoto and is concerned abut other autoimmune issues

## 2018-12-06 ENCOUNTER — PATIENT MESSAGE (OUTPATIENT)
Dept: NEUROLOGY | Facility: CLINIC | Age: 48
End: 2018-12-06

## 2018-12-06 DIAGNOSIS — R25.1 TREMOR: Primary | ICD-10-CM

## 2018-12-07 ENCOUNTER — PATIENT MESSAGE (OUTPATIENT)
Dept: NEUROLOGY | Facility: CLINIC | Age: 48
End: 2018-12-07

## 2018-12-07 RX ORDER — MIRTAZAPINE 15 MG/1
15 TABLET, FILM COATED ORAL
COMMUNITY
Start: 2017-10-09 | End: 2018-12-07 | Stop reason: SDUPTHER

## 2018-12-07 RX ORDER — MIRTAZAPINE 15 MG/1
15 TABLET, FILM COATED ORAL NIGHTLY
Qty: 30 TABLET | Refills: 11 | Status: SHIPPED | OUTPATIENT
Start: 2018-12-07 | End: 2019-09-27 | Stop reason: SDUPTHER

## 2018-12-19 ENCOUNTER — OFFICE VISIT (OUTPATIENT)
Dept: ENDOCRINOLOGY | Facility: CLINIC | Age: 48
End: 2018-12-19
Payer: COMMERCIAL

## 2018-12-19 VITALS
DIASTOLIC BLOOD PRESSURE: 64 MMHG | HEART RATE: 70 BPM | HEIGHT: 63 IN | WEIGHT: 123 LBS | SYSTOLIC BLOOD PRESSURE: 110 MMHG | BODY MASS INDEX: 21.79 KG/M2

## 2018-12-19 DIAGNOSIS — E03.9 HYPOTHYROIDISM, UNSPECIFIED TYPE: Primary | ICD-10-CM

## 2018-12-19 PROCEDURE — 99999 PR PBB SHADOW E&M-EST. PATIENT-LVL III: CPT | Mod: PBBFAC,,, | Performed by: INTERNAL MEDICINE

## 2018-12-19 PROCEDURE — 99213 OFFICE O/P EST LOW 20 MIN: CPT | Mod: S$GLB,,, | Performed by: INTERNAL MEDICINE

## 2018-12-19 PROCEDURE — 3008F BODY MASS INDEX DOCD: CPT | Mod: CPTII,S$GLB,, | Performed by: INTERNAL MEDICINE

## 2018-12-19 RX ORDER — LEVOTHYROXINE SODIUM 88 UG/1
88 TABLET ORAL DAILY
COMMUNITY
End: 2018-12-19

## 2018-12-19 RX ORDER — LEVOTHYROXINE SODIUM 100 UG/1
100 TABLET ORAL DAILY
Qty: 30 TABLET | Refills: 11 | Status: SHIPPED | OUTPATIENT
Start: 2018-12-19 | End: 2019-03-20

## 2018-12-19 NOTE — PROGRESS NOTES
"  CHIEF COMPLAINT: Hypothyroid  48 year old being seen as a f/u. Hypothyroidism x 25 years. On synthroid 88 mcg. States felt "jittery" on 100 mcg synthroid. Was not on cytomel at that time. No palpitations. No tremors. No diarrhea or constipation.         PAST MEDICAL HISTORY/PAST SURGICAL HISTORY:  Reviewed in [facility]    SOCIAL HISTORY: No Tobacco. Social alcohol.     FAMILY HISTORY:  Sister hypothyroid. No DM 2. Parkinsons.     MEDICATIONS/ALLERGIES: The patient's MedCard has been updated and reviewed.      ROS:   Constitutional: No recent significant weight change  Eyes: No recent visual changes  ENT: No dysphagia  Cardiovascular: Denies current anginal symptoms  Respiratory: Denies current respiratory difficulty  Gastrointestinal: No N/V  GenitoUrinary - No dysuria  Skin: No new skin rash  Neurologic: No focal neurologic complaints  Remainder ROS negative        PE:    GENERAL: Well developed, well nourished.  NECK: Supple, trachea midline, No palpable thyroid nodules  CHEST: Resp even and unlabored, CTA bilateral.  CARDIAC: RRR, S1, S2 heard, no murmurs, rubs, S3, or S4      Results for BRANDON LYLE (MRN [id number]) as of 12/19/2018 08:34   Ref. Range 11/20/2018 10:37   TSH Latest Ref Range: 0.400 - 4.000 uIU/mL 8.341 (H)   T3, Total Latest Ref Range: 60 - 180 ng/dL 59 (L)   Free T4 Latest Ref Range: 0.71 - 1.51 ng/dL 0.84         ASSESSMENT/PLAN:  1. Hypothyroidism- TSH elevated. Taking appropriately. She would like to try synthroid 100 mcg gain so will Rx that dose. Discussed symptoms to monitor for.     FOLLOWUP  6 weeks- TSH, Ft4  F/U 1 year- TSH, Ft4    "

## 2019-01-11 ENCOUNTER — OFFICE VISIT (OUTPATIENT)
Dept: PSYCHIATRY | Facility: CLINIC | Age: 49
End: 2019-01-11
Payer: COMMERCIAL

## 2019-01-11 VITALS
HEART RATE: 74 BPM | WEIGHT: 124.31 LBS | HEIGHT: 63 IN | SYSTOLIC BLOOD PRESSURE: 93 MMHG | DIASTOLIC BLOOD PRESSURE: 54 MMHG | BODY MASS INDEX: 22.03 KG/M2

## 2019-01-11 DIAGNOSIS — G20.A1 PARKINSON'S DISEASE: Primary | ICD-10-CM

## 2019-01-11 DIAGNOSIS — F43.23 ADJUSTMENT DISORDER WITH MIXED ANXIETY AND DEPRESSED MOOD: ICD-10-CM

## 2019-01-11 PROCEDURE — 99999 PR PBB SHADOW E&M-EST. PATIENT-LVL III: CPT | Mod: PBBFAC,,, | Performed by: NURSE PRACTITIONER

## 2019-01-11 PROCEDURE — 90792 PSYCH DIAG EVAL W/MED SRVCS: CPT | Mod: S$GLB,,, | Performed by: NURSE PRACTITIONER

## 2019-01-11 PROCEDURE — 90792 PR PSYCHIATRIC DIAGNOSTIC EVALUATION W/MEDICAL SERVICES: ICD-10-PCS | Mod: S$GLB,,, | Performed by: NURSE PRACTITIONER

## 2019-01-11 PROCEDURE — 99999 PR PBB SHADOW E&M-EST. PATIENT-LVL III: ICD-10-PCS | Mod: PBBFAC,,, | Performed by: NURSE PRACTITIONER

## 2019-01-11 NOTE — PROGRESS NOTES
"Outpatient Psychiatry Initial Visit  01/11/2019    ID: 48 year old, , female presenting for an initial evaluation. Met with patient.    Reason for encounter: Self-referral. Pt complains of anxiety surrounding recent dx of Parkinson's disease.    History of Present Illness:  Pt is a 48 year old,  female with a past psychiatric history of anxiety. Presenting to the clinic for an initial evaluation and treatment. Patient also has a past medical history of Parkinson's disease.  He/she is currently taking Remeron 15mg nightly for insomnia, which was prescribed and managed by her PCP. Patient has been taking these medications for about 1 month. She reports that these medications have been effective in treating her insomnia.    Pt received a diagnosis of Parkinson's disease in October of 2018. This started a cycle of anxiety that eventually led to insomnia. She has not yet found a way to cope. "I am still trying to come to terms with it. I know there is not a cure, so now I'm just saying fuck it. I don't even like nanci to see the neurologists anymore because there's nothing they can do."    Tremor started in left hand, then moved to her left foot. As it progresses, she expresses hopelessness about the outcome of the disease process. Soon after being diagnosed, the anxiety led to insomnia. Lack of sleep led to increased anxiety.    She was recently prescribed Remeron 7.5mg nightly, with some efficacy. Eventually dose was increased to 15mg nightly - this is a good dose for her. Feels she is sleeping better now than ever before. This has caused her to become less anxious throughout the day. "I'm actually in a better place now".    Pt has been generally reluctant to try medications. Eventually, she would like to taper off all of them.    Psychologically and emotionally feels at a very good place and has been making progress in coming to terms with her diagnosis.    Pt prescribed rasagiline by neurologist - is " "planning to pick it up this week,      Pt currently endorses or denies the following symptoms:  Psych ROS:  Depression: "pretty good" mood - sleep, enjoys bird watching, some guilt that her family has to deal with this, good energy, good concentration, good appetite, no psychomotor slowing. No SI, no access to firearms  Anxiety: no panic attacks, no agoraphobia, no social anxiety, some "generalized worry"  PTSD: no flashbacks, nightmares, or avoidance of stimuli  Lorene/Psychosis: No manic episodes, no A/V hallucinations  SI - No SI - no access to firearms    Past Psychiatric History:  Past Psych Hx: "post-partum anxiety" - saw LCSW for this No prior suicide attempts or self harm?  Prior diagnosis: no previous psychiatric dx  Prior meds: List each med - strength/dose/effectiveness. Off meds since? (if applicable). No prior knowledge of trying ___.  Current meds: Remeron 15mg nightly  Prior psychotherapy: Post partum anxiety around 9 years ago    Past Medical Hx: PCP Osito Sykes - no TBI, No seizures    Past Surgical Hx: L and R knee arthroscopies    Family Hx: Maternal - "Mom was pretty anxious" when she was young, grandfather alcoholic, no suicidal hx    Father - no significant psychiatric hx, no substance abuse, no suicidal hx    Social Hx:   Childhood: Grew up in [place] [facility], raised by both parents , one older sister. Overall good chilhood  Marital Status:  - Che[place] 20 years  Children: one daughter - Vitaliy  Resides: [place], [place]  Occupation: no full-time job  Hobbies: Bird-watching  Orthodoxy: Raised Denominational  Education level: Masters in music performance  : no  Legal: no    Substance Hx:  Tobacco: No  Alcohol: No  Drug use: No  Caffeine: 1 cup daily  Rehab: no  Prior current AA: no    Review of Symptoms  GENERAL: no weight gain/loss  SKIN: no rashes or lacerations  HEAD: no headahces  EYES: no jaundice, blindness. No exophthalmos  EARS: no dizziness, tinnitus, or hearing loss  NOSE: no changes in " "smell  Mouth/throat: no dyskinetic movements or obvious goiter  CHEST: no SOB, hyperventilation or cough  CARDIO: no tachycardia, bradycardia, or chest pain  ABDOMEN: no nausea, vomiting, pain, constipation, or diarrhea  URINARY:  no frequency, dysuria, or sexual dysfunction  ENDOCRINE: No polydipsia, polyuria, no cold/hot intolerance  MUSCULOSKELETAL - no  joint pain/stiffness - experiences some "dystonia every now and then" in left forearm.  NEUROLOGIC: no seizures, no confusion, memory loss, or forgetfulness. Notable tremor in L hand and L leg r/t Parkinson's dx.    Current Evaluation:  Nutritional Screening:  Considering the patient's height and weight, medications, medical history and preferences, should a referral be made to the dietitian? No  Vitals: most recent vitals signs, dated greater than 90 days prior to this appointment, were reviewed  General: age appropriate, well nourished, casually dressed, neatly groomed  MSK: muscle strength/tone : Termor noted in L hand and L leg r/t Parkinson's dx. Gait/Station: no ataxic, steady    Suicide Risk Assessment:  Protective factors: age, gender, no prior attempts, no prior hospitalizations, no ongoing substance abuse, no psychosis, , has children, denies SI/intent/plan, seeking treatment, access to treatment, future oriented, good primary support, no access to firearms    Risks: dx of chronic illness    Patient is a low immediate and long-term risk considering risk factors    Psychiatric:  Speech: Normal rate, rhythm, volume. Latency, pressured  Mood/Affect: mood "good today", congruent and appropriate   Though Process: organized, logical, linear  Thought Content: no suicidal or homicidal ideation, no A/V hallucinations, delusions or paranoia  Insight: Intact; aware of illness  Judgement: behavior is adequate to circumstances  Orientation: A&O x 4,  Memory: Intact for content of interview, 3/3 immediate, 3/3 after 3 mins. Able to recall recent and remote " events.  Language: Grossly intact, no aphasias   Concentration: world forward and back  Knowledge/Intelligence: appropriate to age and level of education.   Functioning in Relationships: Healthy relationship with  and child  Spouse/Partner: Supportive      ASSESSMENT - DIAGNOSIS - GOALS:  Impression: Pt is a 48 year old,  female with a past psychiatric history of anxiety. Presenting to the clinic for an initial evaluation and treatment. Patient also has a past medical history of Parkinson's disease.  She is currently taking Remeron 15mg nightly for insomnia, which was prescribed and managed by her PCP. Patient has been taking these medications for about 1 month. She reports that these medications have been effective in treating her insomnia. Pt symptoms are currently consistent with adjustment disorder surrounding recent Parkinson's disease diagnosis.    Diagnosis: Adjustment disorder    Pt will soon be starting rasagiline, which was prescribed by her neurologist.    Pt is understandably struggling to come to terms with recent diagnosis of Parkinson's disease    Strengths/Liabilities: Patient accepts feedback & guidance. Patient is motivated for change.     Treatment Goals: Specify outcomes written in observable, behavioral terms  Anxiety: acquire relapse prevention skills, reduce physical symptoms of anxiety, reduce time spent worrying (>30 minutes/day)  Depression: Acquire relapse prevention skills, increasing energy, increasing interest in usual activities, increasing motivation, reducing excessive guilt and reducing fatigue.    Treatment Plan/Recommendations:   Medication Management: The risks and benefits of medication were discussed with the patient.   Meds: At this time, I will not be including pharmacotherapy as part of the treatment plan. Pt instructed to follow up in one month to evaluate effectiveness of rasagiline as prescribed by neurologist.    Return to Clinic: 4 wks  Counseling time: 35  mins  Total time: 60 mins  - Encouraged to begin counseling with one of our on-site LCSWs.  - Call to report any worsening of symptoms or problems associated with medication  - Pt instructed to go to ER if thoughts of harming self or others arise     -Spent 60min face to face with the pt; >50% time spent in counseling   -Supportive therapy and psychoeducation provided   -Pt instructed to call clinic, 911 or go to nearest emergency room if sxs worsen or pt is in   crisis. The pt expresses understanding.    Ayush Alberto, NP

## 2019-01-16 ENCOUNTER — PATIENT MESSAGE (OUTPATIENT)
Dept: NEUROLOGY | Facility: CLINIC | Age: 49
End: 2019-01-16

## 2019-01-16 DIAGNOSIS — G20.A1 PARKINSON'S DISEASE: Primary | ICD-10-CM

## 2019-01-17 ENCOUNTER — PATIENT MESSAGE (OUTPATIENT)
Dept: NEUROLOGY | Facility: CLINIC | Age: 49
End: 2019-01-17

## 2019-01-17 RX ORDER — SELEGILINE HYDROCHLORIDE 5 MG/1
5 TABLET ORAL 2 TIMES DAILY WITH MEALS
Qty: 60 TABLET | Refills: 11 | Status: SHIPPED | OUTPATIENT
Start: 2019-01-17 | End: 2019-01-21 | Stop reason: ALTCHOICE

## 2019-01-18 ENCOUNTER — PATIENT MESSAGE (OUTPATIENT)
Dept: NEUROLOGY | Facility: CLINIC | Age: 49
End: 2019-01-18

## 2019-01-18 DIAGNOSIS — G20.A1 PARKINSON'S DISEASE: Primary | ICD-10-CM

## 2019-01-21 ENCOUNTER — PATIENT MESSAGE (OUTPATIENT)
Dept: NEUROLOGY | Facility: CLINIC | Age: 49
End: 2019-01-21

## 2019-01-21 RX ORDER — RASAGILINE 0.5 MG/1
0.5 TABLET ORAL DAILY
Qty: 30 TABLET | Refills: 11 | Status: SHIPPED | OUTPATIENT
Start: 2019-01-21 | End: 2019-04-11

## 2019-02-04 ENCOUNTER — TELEPHONE (OUTPATIENT)
Dept: PSYCHIATRY | Facility: CLINIC | Age: 49
End: 2019-02-04

## 2019-02-14 ENCOUNTER — OFFICE VISIT (OUTPATIENT)
Dept: PSYCHIATRY | Facility: CLINIC | Age: 49
End: 2019-02-14
Payer: COMMERCIAL

## 2019-02-14 VITALS
WEIGHT: 124.31 LBS | DIASTOLIC BLOOD PRESSURE: 55 MMHG | HEART RATE: 84 BPM | SYSTOLIC BLOOD PRESSURE: 101 MMHG | BODY MASS INDEX: 22.03 KG/M2 | HEIGHT: 63 IN

## 2019-02-14 DIAGNOSIS — F43.23 ADJUSTMENT DISORDER WITH MIXED ANXIETY AND DEPRESSED MOOD: Primary | ICD-10-CM

## 2019-02-14 PROCEDURE — 99214 PR OFFICE/OUTPT VISIT, EST, LEVL IV, 30-39 MIN: ICD-10-PCS | Mod: S$GLB,,, | Performed by: NURSE PRACTITIONER

## 2019-02-14 PROCEDURE — 3008F BODY MASS INDEX DOCD: CPT | Mod: CPTII,S$GLB,, | Performed by: NURSE PRACTITIONER

## 2019-02-14 PROCEDURE — 99214 OFFICE O/P EST MOD 30 MIN: CPT | Mod: S$GLB,,, | Performed by: NURSE PRACTITIONER

## 2019-02-14 PROCEDURE — 3008F PR BODY MASS INDEX (BMI) DOCUMENTED: ICD-10-PCS | Mod: CPTII,S$GLB,, | Performed by: NURSE PRACTITIONER

## 2019-02-14 PROCEDURE — 99999 PR PBB SHADOW E&M-EST. PATIENT-LVL III: ICD-10-PCS | Mod: PBBFAC,,, | Performed by: NURSE PRACTITIONER

## 2019-02-14 PROCEDURE — 90833 PSYTX W PT W E/M 30 MIN: CPT | Mod: S$GLB,,, | Performed by: NURSE PRACTITIONER

## 2019-02-14 PROCEDURE — 90833 PR PSYCHOTHERAPY W/PATIENT W/E&M, 30 MIN (ADD ON): ICD-10-PCS | Mod: S$GLB,,, | Performed by: NURSE PRACTITIONER

## 2019-02-14 PROCEDURE — 99999 PR PBB SHADOW E&M-EST. PATIENT-LVL III: CPT | Mod: PBBFAC,,, | Performed by: NURSE PRACTITIONER

## 2019-02-14 NOTE — PROGRESS NOTES
"Outpatient Psychiatry Follow-Up Visit    Clinical Status of Patient: Outpatient (Ambulatory)  02/14/2019     Chief Complaint: Pt is a 48 year old female who presents today for a follow-up. Met with patient.       Interval History and Content of Current Session:  Interim Events/Subjective Report/Content of Current Session:  follow up appointment.    Pt is a 48 year old female with past psychiatric hx of adjustment disorder r/t recent diagnosis of Parkinson's disease, presents for follow up treatment.    Emotionally feels at a very good place and has been making progress in coming to terms with her diagnosis. She is currently taking rasagiline 1mg QD for Parkinson's, and Remeron 15mg QHS for insomnia.    Went [place] [employer] [place] [employer]. She enjoyed herself. "I had my moments, but I handled it well over all". She went to see different [employer] exhibits, which she really enjoyed.    She started taking Azilect at our last appointment, has felt an increase in mood and decrease in anxiety.    Mirtazapine has improved her sleep.    Feels she has handled stress better than she would have a month ago. "It feels like I'm getting to a happier place."    She is considering therapy with KIMBERLY Landrum, but is trying to decide whether or not to leave her current therapist.     "I am starting to be able to cope with my tremor better. It's starting to get slightly better in the morning since I started the Azilect"      Past Psychiatric hx: Pt is a 48 year old,  female with a past psychiatric history of anxiety. Presenting to the clinic for an initial evaluation and treatment. Patient also has a past medical history of Parkinson's disease.  He/she is currently taking Remeron 15mg nightly for insomnia, which was prescribed and managed by her PCP. Patient has been taking these medications for about 1 month. She reports that these medications have been effective in treating her insomnia.     Pt received a diagnosis of Parkinson's " "disease in October of 2018. This started a cycle of anxiety that eventually led to insomnia. She has not yet found a way to cope. "I am still trying to come to terms with it. I know there is not a cure, so now I'm just saying fuck it. I don't even like nanci to see the neurologists anymore because there's nothing they can do."       Past Medical hx:   Past Medical History:   Diagnosis Date    Allergy     Hypothyroidism     Sleep apnea    Parkinson's disease    Interim hx:  Medication changes last visit: Pt was instructed to fill prescription of rasagiline  Anxiety: 4/10  Depression: 4/10     Denies suicidal/homicidal ideations.  Denies hopelessness/worthlessness.    Denies auditory/visual hallucinations      Alcohol: none   Drug: none   Caffeine: 1-2 cups tea daily  Tobacco: none      Review of Systems   · PSYCHIATRIC: Pertinent items are noted in the narrative.        CONSTITUTIONAL: weight stable        M/S: no pain today         ENT: no allergies noted today        ABD: no n/v/d     Past Medical, Family and Social History: The patient's past medical, family and social history have been reviewed and updated as appropriate within the electronic medical record. See encounter notes.     Medication: rasagiline 0.5mg QD, mirtazapine 15mg QHS     Compliance: yes      Side effects: tolerates well     Risk Parameters:  Patient reports no suicidal ideation  Patient reports no homicidal ideation  Patient reports no self-injurious behavior  Patient reports no violent behavior     Exam (detailed: at least 9 elements; comprehensive: all 15 elements)   Constitutional  Vitals:  Most recent vital signs, dated less than 90 days prior to this appointment, were reviewed. BP (!) 101/55   Pulse 84   Ht 5' 3" (1.6 m)   Wt 56.4 kg (124 lb 4.8 oz)   LMP 01/27/2019 (Exact Date)   BMI 22.02 kg/m²    General:  unremarkable, age appropriate, casual attire, good eye contact, good rapport       Musculoskeletal  Muscle Strength/Tone:  " "no flaccidity, no tremor    Gait & Station:  normal      Psychiatric                       Speech:  normal tone, normal rate, rhythm, and volume   Mood & Affect:   euthymic, congruent and appropriate         Thought Process:   Goal directed; Linear    Associations:   intact   Thought Content:   No SI/HI, delusions, or paranoia, no AV/VH   Insight & Judgement:   Good, adequate to circumstances   Orientation:   grossly intact; alert and oriented x 4    Memory:  intact for content of interview    Language:  grossly intact, can repeat    Attention Span  : Grossly intact for content of interview   Fund of Knowledge:   intact and appropriate to age and level of education          Assessment and Diagnosis   Status/Progress: Based on the examination today, the patient's problem(s) is/are under fair control.  New problems have not been presented today.     Recent diagnosis of Parkinson's disease has caused this pt much distress, and a great deal of her anxiety can be attributed to this. She is currently taking rasagiline, which is an irreversible inhibitor of monoamine oxidase type B (MAO-B) that shows promise as an advance in the treatment of Parkinson's disease. Serotonergic antidepressants such as selective serotonin reuptake inhibitors (SSRIs), selective serotonin/norepinephrine reuptake inhibitors, and tricyclic antidepressants (TCAs) should be used with caution alongsideuse rasagiline with these agents.    Impression:     Pt has been experiencing anxiety and a depressed mood r/t recent diagnosis of Parkinson's disease. She is currently taking Remeron 15mg QHS, in addition to rasagiline 0.5mg PO QD for tx of Parkinson's. Pt states that current medication regimen has been moderately effective in treating her symptoms.     Emotionally feels at a very good place and has been making progress in coming to terms with her diagnosis    " I am starting to be able to cope with my tremor better. It's starting to get slightly " "better in the morning since I started the Azilect"      Diagnosis: Adjustment disorder    Intervention/Counseling/Treatment Plan   · Medication Management:      1. Continue Mirtazapine 15mg QHS for insomnia     2. Continue rasagiline 1mg PO QD for tx of Parkinson's.Since nonselective MAO inhibitors can cause acute hypertensive reactions if the patient ingests high-tyramine foods, and since one cannot rule out that rasagiline will be a nonselective MAO inhibitor in some patients, patients should be warned to avoid foods rich in tyramine. For the same reasons, patients should avoid OTC sympathomimetics such as pseudoephedrine, phenylpropanolamine, ephedrine, and phenylephrine.     3. Call to report any worsening of symptoms or problems with the medication. Pt instructed to go to ER with thoughts of harming self, others     4. Continue seeing KIMBERLY Landrum LCSW for counseling     5. Labs: previous labs reviewed; no new orders    Psychotherapy:   · Target symptoms: anxiety r/t recent diagnosis of Parkinson's  · Why chosen therapy is appropriate versus another modality: relevant to diagnosis, patient responds to this modality  · Outcome monitoring methods: self-report, observation, feedback from family   · Therapeutic intervention type: supportive psychotherapy  · Topics discussed/themes: building skills sets for symptom management, symptom recognition, nutrition, exercise  · The patient's response to the intervention is accepting. The patient's progress toward treatment goals is positive progress.  · Duration of intervention: 20 minutes     Return to clinic: 2 months    -Spent 30min face to face with the pt; >50% time spent in counseling   -Supportive therapy and psychoeducation provided  -R/B/SE's of medications discussed with the pt who expresses understanding and chooses to take medications as prescribed.   -Pt instructed to call clinic, 911 or go to nearest emergency room if sxs worsen or pt is in   crisis. The pt " expresses understanding.    Ayush Alberto, NP

## 2019-02-25 ENCOUNTER — PATIENT MESSAGE (OUTPATIENT)
Dept: NEUROLOGY | Facility: CLINIC | Age: 49
End: 2019-02-25

## 2019-02-26 ENCOUNTER — LAB VISIT (OUTPATIENT)
Dept: LAB | Facility: HOSPITAL | Age: 49
End: 2019-02-26
Attending: INTERNAL MEDICINE
Payer: COMMERCIAL

## 2019-02-26 DIAGNOSIS — E03.9 HYPOTHYROIDISM, UNSPECIFIED TYPE: Primary | ICD-10-CM

## 2019-02-26 DIAGNOSIS — E03.9 HYPOTHYROIDISM, UNSPECIFIED TYPE: ICD-10-CM

## 2019-02-26 LAB
T4 FREE SERPL-MCNC: 1.06 NG/DL
TSH SERPL DL<=0.005 MIU/L-ACNC: 4.92 UIU/ML

## 2019-02-26 PROCEDURE — 84439 ASSAY OF FREE THYROXINE: CPT

## 2019-02-26 PROCEDURE — 84443 ASSAY THYROID STIM HORMONE: CPT

## 2019-02-26 PROCEDURE — 36415 COLL VENOUS BLD VENIPUNCTURE: CPT | Mod: PO

## 2019-02-26 RX ORDER — LEVOTHYROXINE SODIUM 112 UG/1
112 TABLET ORAL DAILY
Qty: 90 TABLET | Refills: 3 | Status: SHIPPED | OUTPATIENT
Start: 2019-02-26 | End: 2019-03-20

## 2019-02-26 RX ORDER — LEVOTHYROXINE SODIUM 112 UG/1
112 TABLET ORAL DAILY
Qty: 30 TABLET | Refills: 11 | Status: SHIPPED | OUTPATIENT
Start: 2019-02-26 | End: 2019-02-26 | Stop reason: SDUPTHER

## 2019-02-26 NOTE — TELEPHONE ENCOUNTER
Pt verbalized understanding of Dr Gibbons's message:                Let her know to increase the synthroid from 100 mcg to 112 mcg. Check TSH in 6 weeks.                     appt made for 4/9/19 in [place] lab  Pt wants a hard script. Rx sent to Provider

## 2019-02-28 ENCOUNTER — PATIENT MESSAGE (OUTPATIENT)
Dept: NEUROLOGY | Facility: CLINIC | Age: 49
End: 2019-02-28

## 2019-03-19 ENCOUNTER — PATIENT MESSAGE (OUTPATIENT)
Dept: ENDOCRINOLOGY | Facility: CLINIC | Age: 49
End: 2019-03-19

## 2019-03-19 DIAGNOSIS — E03.9 HYPOTHYROIDISM, UNSPECIFIED TYPE: Primary | ICD-10-CM

## 2019-03-20 ENCOUNTER — PATIENT MESSAGE (OUTPATIENT)
Dept: ENDOCRINOLOGY | Facility: CLINIC | Age: 49
End: 2019-03-20

## 2019-03-20 DIAGNOSIS — E03.9 HYPOTHYROIDISM, UNSPECIFIED TYPE: Primary | ICD-10-CM

## 2019-03-20 RX ORDER — LEVOTHYROXINE SODIUM 100 UG/1
100 TABLET ORAL DAILY
Qty: 30 TABLET | Refills: 11 | Status: SHIPPED | OUTPATIENT
Start: 2019-03-20 | End: 2020-01-20 | Stop reason: SDUPTHER

## 2019-03-20 RX ORDER — LIOTHYRONINE SODIUM 5 UG/1
5 TABLET ORAL DAILY
Qty: 30 TABLET | Refills: 6 | Status: SHIPPED | OUTPATIENT
Start: 2019-03-20 | End: 2019-09-15 | Stop reason: SDUPTHER

## 2019-03-20 NOTE — TELEPHONE ENCOUNTER
Just to clarify before I send. She wants:  Synthroid 100 mcg once daily and cytomel 5 mcg once daily?

## 2019-04-07 ENCOUNTER — PATIENT MESSAGE (OUTPATIENT)
Dept: NEUROLOGY | Facility: CLINIC | Age: 49
End: 2019-04-07

## 2019-04-07 ENCOUNTER — PATIENT MESSAGE (OUTPATIENT)
Dept: PSYCHIATRY | Facility: CLINIC | Age: 49
End: 2019-04-07

## 2019-04-18 ENCOUNTER — PATIENT MESSAGE (OUTPATIENT)
Dept: NEUROLOGY | Facility: CLINIC | Age: 49
End: 2019-04-18

## 2019-04-18 ENCOUNTER — OFFICE VISIT (OUTPATIENT)
Dept: NEUROLOGY | Facility: CLINIC | Age: 49
End: 2019-04-18
Payer: COMMERCIAL

## 2019-04-18 DIAGNOSIS — G20.A1 PARKINSON'S DISEASE: Primary | ICD-10-CM

## 2019-04-18 DIAGNOSIS — G24.8 LIMB DYSTONIA: ICD-10-CM

## 2019-04-18 PROCEDURE — 99212 OFFICE O/P EST SF 10 MIN: CPT | Performed by: PSYCHIATRY & NEUROLOGY

## 2019-04-18 PROCEDURE — 99214 OFFICE O/P EST MOD 30 MIN: CPT | Mod: 95,,, | Performed by: PSYCHIATRY & NEUROLOGY

## 2019-04-18 PROCEDURE — 99214 PR OFFICE/OUTPT VISIT, EST, LEVL IV, 30-39 MIN: ICD-10-PCS | Mod: 95,,, | Performed by: PSYCHIATRY & NEUROLOGY

## 2019-04-18 RX ORDER — TRIHEXYPHENIDYL HYDROCHLORIDE 2 MG/1
1 TABLET ORAL
Qty: 45 TABLET | Refills: 11 | Status: SHIPPED | OUTPATIENT
Start: 2019-04-18 | End: 2019-06-18 | Stop reason: ALTCHOICE

## 2019-04-18 NOTE — ASSESSMENT & PLAN NOTE
No response for azilect, levodopa.  Left tremor is bothersome, mildly disabling, but also the mirror movements are limiting ability.   -> will now try artane   -> next will try bentropine   -> discussed DBS at length

## 2019-04-18 NOTE — PROGRESS NOTES
Nathalie LAL Chief Complaints during this visit:  f/u Patient visit for  parkinsonism    No referring provider defined for this encounter.    Primary Care Physician  Osito Sykes, DO  [address] [place] [address]        History of present illness:   48 y.o. W seen in f/u for PD VIRTUAL VISIT.  On azilect for third month.  No s/e, so she has started weaning off due to cost.        Interval history 4/2018:  ... seen in consultation at the request of  Dr. Cho for evaluation of parkinsonism.  She was seen by Martir last year and has had multiple other neurologic second opinions ([facility], [facility], [facility]).  Now looking for a home-base neurologist.  She would like to stay off medications as long as possible (mostly because of the trials and trauma seen in her father).  Tried one sinemet morning and evening.  No nausea and no effect.  Mucina puriens twice a day seemed to reduce the tightness.  Recognizes anxiety is a big trigger for her.  Insomnia triggers.  CBD oil were not helpful.  Off lexapro due to tremors.  Two docs have consider remeron.  3-4 years ago, had an action tremor at work, intermittently at work (with stained glass).  First 3 docs thought it was ET, offered meds that she declined.   Now the tremor is present most of the time, not just action tremor.   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  She really struggled with diagnosis and could not reach .  This spiraled into insomnia, anxiety.  Unique tried botox in left arm and foot.  Suggested amantadine, azilect.  Her father had PD.  She did the 23&Me, negative for LRRK.  Had whole genome at [facility], still pending.  Former symphony Bass player.  Has Hashimotos and luisito barr.      Interval history 6/14/17:  Patient states that she had insomnia about a week after he saw us. Started on Lexapro and Xannax by primary care doctor. Anxiety has been very up and down.   Patient has not been sleeping. She has not  been sleeping. Tremors have been worse since last visit. She is not sure if anxiety is making things worse.   Has not started CD/LD yet   She is also loosing weight.   Tightness in leg is worse.   She is very concerned about not sleeping. Has been about two weeks now.         History of Present Illness (HPI):  RH female of mixed southern  and med[place] descent. Pt has had a left hand tremor for about a year and a half now. Her father had PD, dx in 60s and passed in his 70s. She has seen Dr. Norman as well on the [facility]. She has also had a strange feeling in her left leg. It always feels tense, like she has to consciously relax it. No decrease in mobility or change in strength. She thinks this has not gotten any worse in the last few months but she gets a feeling as if her toes want to curl down as well. Tremor in the left hand can sometimes involve the arm, she feels like she has lost some strength in the left hand too. Tremor increases with stress. Not present at rest, only present during action. No feeling of pulling or twisting in the left hand or arm. Sometimes the left eye twitches. Has known bony abnormality of the neck that she reports as well.     II.  Review of systems:  As in HPI,  otherwise, balance 3 systems reviewed and are negative.    III.  Past Medical History:   Diagnosis Date    Allergy     Hypothyroidism     Sleep apnea      Family History   Problem Relation Age of Onset    Parkinsonism Father     Breast cancer Maternal Aunt     Ovarian cancer Maternal Aunt     Allergies Mother     Angioedema Neg Hx     Asthma Neg Hx     Atopy Neg Hx     Eczema Neg Hx     Immunodeficiency Neg Hx     Rhinitis Neg Hx     Urticaria Neg Hx      Social history:  , artist works in stained glass      Current Outpatient Medications on File Prior to Visit   Medication Sig Dispense Refill    cholecalciferol, vitamin D3, 5,000 unit/mL Drop Take 50,000 Units by mouth every 7 days.        coenzyme Q10 100 mg capsule Take 100 mg by mouth.      cyanocobalamin 1,000 mcg/mL injection Inject 1 mL (1,000 mcg total) into the muscle every 28 days. 10 mL 1    levothyroxine (SYNTHROID) 100 MCG tablet Take 1 tablet (100 mcg total) by mouth once daily. 30 tablet 11    liothyronine (CYTOMEL) 5 MCG Tab Take 1 tablet (5 mcg total) by mouth once daily. 30 tablet 6    mirtazapine (REMERON) 15 MG tablet Take 1 tablet (15 mg total) by mouth every evening. 30 tablet 11    multivitamin (THERAGRAN) per tablet Take 1 tablet by mouth.       No current facility-administered medications on file prior to visit.        PRIOR problem-specific medications tried:  Sinemet bid not helpful, azilect    Review of patient's allergies indicates:   Allergen Reactions    Codeine     Hydrocodone     Sulfa (sulfonamide antibiotics)        IV. Physical Exam    There were no vitals filed for this visit.  Observational exam only as virtual visit.    General appearance: Well nourished, well developed, no acute distress.           -------------------------------------------------------------  Facial Expression: 1: Slight: Minimal masked facies manifested only by decreased frequency of blinking.       Affect: full       Orientation to time & place:  Oriented to time, place, person and situation       Attention & concentration:  Normal attention span and concentration       Fund of knowledge:  Aware of current events        Speech:  normal (not dysarthric)  -------------------------------------------------------  Cranial nerves: wears glasses, face symmetrical, hearing intact        -------------------------------------------------------   MOVEMENT DISORDERS FOCUSED EXAM  Abnormality of movement (bradykinesia, hyperkinesia) present? Yes, bradykinetic, left posturing hand   Tremor present?   Yes, resting tremor left hand and foot      V.  Laboratory/ Radiological Data:          Lab Results   Component Value Date    TSH 4.925 (H) 02/26/2019      Lab Results   Component Value Date    PLTOIXAN55 1001 (H) 03/11/2016         VI. Assessment and Plan            Problem List Items Addressed This Visit        1 - High    Parkinson's disease - Primary    Overview     2013 Bradykinesia and left resting tremor.         Current Assessment & Plan     No response for azilect, levodopa.  Left tremor is bothersome, mildly disabling, but also the mirror movements are limiting ability.   -> will now try artane   -> next will try bentropine   -> discussed DBS at length         Relevant Medications    trihexyphenidyl (ARTANE) 2 MG tablet       2     Limb dystonia                No follow-ups on file.

## 2019-05-01 ENCOUNTER — PATIENT MESSAGE (OUTPATIENT)
Dept: NEUROLOGY | Facility: CLINIC | Age: 49
End: 2019-05-01

## 2019-05-03 ENCOUNTER — PATIENT MESSAGE (OUTPATIENT)
Dept: NEUROLOGY | Facility: CLINIC | Age: 49
End: 2019-05-03

## 2019-05-16 ENCOUNTER — LAB VISIT (OUTPATIENT)
Dept: LAB | Facility: HOSPITAL | Age: 49
End: 2019-05-16
Attending: INTERNAL MEDICINE
Payer: COMMERCIAL

## 2019-05-16 DIAGNOSIS — E03.9 HYPOTHYROIDISM, UNSPECIFIED TYPE: ICD-10-CM

## 2019-05-16 LAB — TSH SERPL DL<=0.005 MIU/L-ACNC: 1.25 UIU/ML (ref 0.4–4)

## 2019-05-16 PROCEDURE — 36415 COLL VENOUS BLD VENIPUNCTURE: CPT | Mod: PO

## 2019-05-16 PROCEDURE — 84443 ASSAY THYROID STIM HORMONE: CPT

## 2019-05-20 ENCOUNTER — PATIENT MESSAGE (OUTPATIENT)
Dept: NEUROLOGY | Facility: CLINIC | Age: 49
End: 2019-05-20

## 2019-05-20 ENCOUNTER — TELEPHONE (OUTPATIENT)
Dept: ENDOCRINOLOGY | Facility: CLINIC | Age: 49
End: 2019-05-20

## 2019-05-20 NOTE — TELEPHONE ENCOUNTER
----- Message from Swathi Basilio sent at 5/20/2019  2:34 PM CDT -----  Contact: self  Type:  Patient Returning Call    Who Called:  Patient   Who Left Message for Patient:  Nurse   Does the patient know what this is regarding?:    Best Call Back Number:  [phone] (home)     Additional Information:

## 2019-05-22 ENCOUNTER — PATIENT MESSAGE (OUTPATIENT)
Dept: NEUROLOGY | Facility: CLINIC | Age: 49
End: 2019-05-22

## 2019-06-10 ENCOUNTER — PATIENT MESSAGE (OUTPATIENT)
Dept: NEUROLOGY | Facility: CLINIC | Age: 49
End: 2019-06-10

## 2019-06-13 ENCOUNTER — PATIENT MESSAGE (OUTPATIENT)
Dept: NEUROLOGY | Facility: CLINIC | Age: 49
End: 2019-06-13

## 2019-06-16 ENCOUNTER — PATIENT MESSAGE (OUTPATIENT)
Dept: NEUROLOGY | Facility: CLINIC | Age: 49
End: 2019-06-16

## 2019-06-18 ENCOUNTER — PATIENT MESSAGE (OUTPATIENT)
Dept: NEUROLOGY | Facility: CLINIC | Age: 49
End: 2019-06-18

## 2019-06-18 DIAGNOSIS — G20.A1 PARKINSON'S DISEASE: Primary | ICD-10-CM

## 2019-06-18 RX ORDER — AMANTADINE HYDROCHLORIDE 100 MG/1
100 TABLET ORAL EVERY MORNING
Qty: 30 TABLET | Refills: 11 | Status: SHIPPED | OUTPATIENT
Start: 2019-06-18 | End: 2019-08-29

## 2019-07-06 ENCOUNTER — PATIENT MESSAGE (OUTPATIENT)
Dept: NEUROLOGY | Facility: CLINIC | Age: 49
End: 2019-07-06

## 2019-07-08 RX ORDER — TRIHEXYPHENIDYL HYDROCHLORIDE 2 MG/1
TABLET ORAL
Qty: 45 TABLET | Refills: 11 | Status: SHIPPED | OUTPATIENT
Start: 2019-07-08 | End: 2019-08-29

## 2019-07-08 NOTE — TELEPHONE ENCOUNTER
Refill auth for Artane 2mg, 1 PO TID, 30 day supply with 3 additional refills called into [id number] [employer].

## 2019-07-08 NOTE — TELEPHONE ENCOUNTER
----- Message from Susanne Mendez sent at 7/8/2019  8:02 AM CDT -----  Rx Refill/Request     Is this a Refill or New Rx:  Refill(needing new prescription due to the change in the dosage per the doctor)    Rx Name and Strengthtrihexyphenidyl (ARTANE) 2 MG tablet  :    Preferred Pharmacy with phone number:   [id number] [facility] [address] [facility].   [address] [facility]. [address] [place] [address]  Phone: [phone] Fax: [phone]    Communication Preference:pt @ [phone]  Additional Information: patient is completely out of the medication

## 2019-08-06 ENCOUNTER — PATIENT MESSAGE (OUTPATIENT)
Dept: NEUROLOGY | Facility: CLINIC | Age: 49
End: 2019-08-06

## 2019-08-16 ENCOUNTER — PATIENT MESSAGE (OUTPATIENT)
Dept: NEUROLOGY | Facility: CLINIC | Age: 49
End: 2019-08-16

## 2019-08-17 ENCOUNTER — PATIENT MESSAGE (OUTPATIENT)
Dept: NEUROLOGY | Facility: CLINIC | Age: 49
End: 2019-08-17

## 2019-08-29 ENCOUNTER — OFFICE VISIT (OUTPATIENT)
Dept: NEUROLOGY | Facility: CLINIC | Age: 49
End: 2019-08-29
Payer: COMMERCIAL

## 2019-08-29 VITALS
DIASTOLIC BLOOD PRESSURE: 73 MMHG | BODY MASS INDEX: 22.5 KG/M2 | HEIGHT: 63 IN | WEIGHT: 127 LBS | HEART RATE: 76 BPM | SYSTOLIC BLOOD PRESSURE: 112 MMHG

## 2019-08-29 DIAGNOSIS — R25.1 TREMOR: ICD-10-CM

## 2019-08-29 DIAGNOSIS — G24.8 LIMB DYSTONIA: Primary | ICD-10-CM

## 2019-08-29 DIAGNOSIS — G20.A1 PARKINSON'S DISEASE: Primary | ICD-10-CM

## 2019-08-29 DIAGNOSIS — G24.8 LIMB DYSTONIA: ICD-10-CM

## 2019-08-29 PROCEDURE — 3008F BODY MASS INDEX DOCD: CPT | Mod: CPTII,S$GLB,, | Performed by: PSYCHIATRY & NEUROLOGY

## 2019-08-29 PROCEDURE — 3008F PR BODY MASS INDEX (BMI) DOCUMENTED: ICD-10-PCS | Mod: CPTII,S$GLB,, | Performed by: PSYCHIATRY & NEUROLOGY

## 2019-08-29 PROCEDURE — 99999 PR PBB SHADOW E&M-EST. PATIENT-LVL III: ICD-10-PCS | Mod: PBBFAC,,, | Performed by: PSYCHIATRY & NEUROLOGY

## 2019-08-29 PROCEDURE — 99214 PR OFFICE/OUTPT VISIT, EST, LEVL IV, 30-39 MIN: ICD-10-PCS | Mod: S$GLB,,, | Performed by: PSYCHIATRY & NEUROLOGY

## 2019-08-29 PROCEDURE — 99214 OFFICE O/P EST MOD 30 MIN: CPT | Mod: S$GLB,,, | Performed by: PSYCHIATRY & NEUROLOGY

## 2019-08-29 PROCEDURE — 99999 PR PBB SHADOW E&M-EST. PATIENT-LVL III: CPT | Mod: PBBFAC,,, | Performed by: PSYCHIATRY & NEUROLOGY

## 2019-08-29 RX ORDER — BENZTROPINE MESYLATE 0.5 MG/1
0.5 TABLET ORAL 2 TIMES DAILY
Qty: 60 TABLET | Refills: 0 | Status: SHIPPED | OUTPATIENT
Start: 2019-08-29 | End: 2019-09-20 | Stop reason: SDUPTHER

## 2019-08-29 NOTE — PROGRESS NOTES
Nathalie LAL Chief Complaints during this visit:  f/u Patient visit for  parkinsonism    No referring provider defined for this encounter.    Primary Care Physician  Osito Sykes, DO  [address] [place] [address]        History of present illness:   48 y.o. W seen in f/u for Parkinson's Disease.  Tried artane, amantadine without effects.    Left hand tremors worse, more apraxia.  Toe curling has been much better.  Wonders about botox left hand, third digit in particularly.    Also wants to try [employer]eli Trio wrist device.      4/18/19:  VIRTUAL VISIT.  On azilect for third month.  No s/e, so she has started weaning off due to cost.    Interval history 4/2018:  ... seen in consultation at the request of  Dr. Cho for evaluation of parkinsonism.  She was seen by Martir last year and has had multiple other neurologic second opinions ([place], [place], [facility]).  Now looking for a home-base neurologist.  She would like to stay off medications as long as possible (mostly because of the trials and trauma seen in her father).  Tried one sinemet morning and evening.  No nausea and no effect.  Mucina puriens twice a day seemed to reduce the tightness.  Recognizes anxiety is a big trigger for her.  Insomnia triggers.  CBD oil were not helpful.  Off lexapro due to tremors.  Two docs have consider remeron.  3-4 years ago, had an action tremor at work, intermittently at work (with stained glass).  First 3 docs thought it was ET, offered meds that she declined.   Now the tremor is present most of the time, not just action tremor.   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  She really struggled with diagnosis and could not reach .  This spiraled into insomnia, anxiety.  Unique tried botox in left arm and foot.  Suggested amantadine, azilect.  Her father had PD.  She did the 23&Me, negative for LRRK.  Had whole genome at [facility], still pending.  Former symphony Bass player.  Has  Hashimotos and luisito barr.      Interval history 6/14/17:  Patient states that she had insomnia about a week after he saw us. Started on Lexapro and Xannax by primary care doctor. Anxiety has been very up and down.   Patient has not been sleeping. She has not been sleeping. Tremors have been worse since last visit. She is not sure if anxiety is making things worse.   Has not started CD/LD yet   She is also loosing weight.   Tightness in leg is worse.   She is very concerned about not sleeping. Has been about two weeks now.         History of Present Illness (HPI):  RH female of mixed southern  and mediterranean descent. Pt has had a left hand tremor for about a year and a half now. Her father had PD, dx in 60s and passed in his 70s. She has seen Dr. Norman as well on the [facility]. She has also had a strange feeling in her left leg. It always feels tense, like she has to consciously relax it. No decrease in mobility or change in strength. She thinks this has not gotten any worse in the last few months but she gets a feeling as if her toes want to curl down as well. Tremor in the left hand can sometimes involve the arm, she feels like she has lost some strength in the left hand too. Tremor increases with stress. Not present at rest, only present during action. No feeling of pulling or twisting in the left hand or arm. Sometimes the left eye twitches. Has known bony abnormality of the neck that she reports as well.     II.  Review of systems:  As in HPI,  otherwise, balance 3 systems reviewed and are negative.    III.  Past Medical History:   Diagnosis Date    Allergy     Hypothyroidism     Sleep apnea      Family History   Problem Relation Age of Onset    Parkinsonism Father     Breast cancer Maternal Aunt     Ovarian cancer Maternal Aunt     Allergies Mother     Angioedema Neg Hx     Asthma Neg Hx     Atopy Neg Hx     Eczema Neg Hx     Immunodeficiency Neg Hx     Rhinitis Neg Hx     Urticaria  "Neg Hx      Social history:  , artist works in stained glass      Current Outpatient Medications on File Prior to Visit   Medication Sig Dispense Refill    cholecalciferol, vitamin D3, 5,000 unit/mL Drop Take 50,000 Units by mouth every 7 days.       coenzyme Q10 100 mg capsule Take 100 mg by mouth.      cyanocobalamin 1,000 mcg/mL injection Inject 1 mL (1,000 mcg total) into the muscle every 28 days. 10 mL 1    levothyroxine (SYNTHROID) 100 MCG tablet Take 1 tablet (100 mcg total) by mouth once daily. 30 tablet 11    liothyronine (CYTOMEL) 5 MCG Tab Take 1 tablet (5 mcg total) by mouth once daily. 30 tablet 6    mirtazapine (REMERON) 15 MG tablet Take 1 tablet (15 mg total) by mouth every evening. 30 tablet 11    multivitamin (THERAGRAN) per tablet Take 1 tablet by mouth.      [DISCONTINUED] amantadine HCl 100 mg Tab Take 1 tablet (100 mg total) by mouth every morning. 30 tablet 11    [DISCONTINUED] trihexyphenidyl (ARTANE) 2 MG tablet TAKE 0.5 TABLETS (1 MG TOTAL) BY MOUTH 3 (THREE) TIMES DAILY WITH MEALS. 45 tablet 11     No current facility-administered medications on file prior to visit.        PRIOR problem-specific medications tried:  Sinemet bid not helpful, azilect, artane, amantadine (not helpful)      Review of patient's allergies indicates:   Allergen Reactions    Codeine     Hydrocodone     Sulfa (sulfonamide antibiotics)        IV. Physical Exam    Vitals:    08/29/19 0948   BP: 112/73   Pulse: 76   Weight: 57.6 kg (127 lb)   Height: 5' 3" (1.6 m)     Observational exam only as virtual visit.    General appearance: Well nourished, well developed, no acute distress.           -------------------------------------------------------------  Facial Expression: 1: Slight: Minimal masked facies manifested only by decreased frequency of blinking.       Affect: full       Orientation to time & place:  Oriented to time, place, person and situation       Attention & concentration:  Normal " attention span and concentration       Fund of knowledge:  Aware of current events        Speech:  normal (not dysarthric)  -------------------------------------------------------  Cranial nerves: wears glasses, face symmetrical, hearing intact        -------------------------------------------------------   MOVEMENT DISORDERS FOCUSED EXAM  Abnormality of movement (bradykinesia, hyperkinesia) present? Yes, bradykinetic, left posturing hand   Tremor present?   Yes, resting tremor left hand and foot      V.  Laboratory/ Radiological Data:          Lab Results   Component Value Date    TSH 1.254 05/16/2019     Lab Results   Component Value Date    QFXAPPRK08 1001 (H) 03/11/2016         VI. Assessment and Plan            Problem List Items Addressed This Visit        1 - High    Parkinson's disease - Primary    Overview     2013 Bradykinesia and left resting tremor.         Current Assessment & Plan     Worsening left tremor, apraxia.   -> trial of cogentin   -> consider inbrijia next (no response with carbidopa-levodopa)         Relevant Medications    benztropine (COGENTIN) 0.5 MG tablet    Tremor    Overview     High frequency, low amplitude          Current Assessment & Plan     Sent prescription to Araceli Trio for size small wrist, wing-beating tremor.            2     Limb dystonia    Overview     Left foot, arm.         Current Assessment & Plan     Toe-curling has been better, but she wonders if botox in left arm would help relieve the pain caused at joints from tremors.  I believe it would.   -> botox, 100 units, HÉCTOR                     Follow up in about 4 months (around 12/29/2019).

## 2019-08-29 NOTE — ASSESSMENT & PLAN NOTE
Toe-curling has been better, but she wonders if botox in left arm would help relieve the pain caused at joints from tremors.  I believe it would.   -> botox, 100 units, HÉCTOR

## 2019-08-29 NOTE — ASSESSMENT & PLAN NOTE
Worsening left tremor, apraxia.   -> trial of cogentin   -> consider inbrijia next (no response with carbidopa-levodopa)

## 2019-08-30 ENCOUNTER — PATIENT MESSAGE (OUTPATIENT)
Dept: NEUROLOGY | Facility: CLINIC | Age: 49
End: 2019-08-30

## 2019-09-16 RX ORDER — LIOTHYRONINE SODIUM 5 UG/1
5 TABLET ORAL DAILY
Qty: 30 TABLET | Refills: 6 | Status: SHIPPED | OUTPATIENT
Start: 2019-09-16 | End: 2020-03-13

## 2019-09-17 ENCOUNTER — TELEPHONE (OUTPATIENT)
Dept: NEUROLOGY | Facility: CLINIC | Age: 49
End: 2019-09-17

## 2019-09-17 NOTE — TELEPHONE ENCOUNTER
----- Message from Alejandro Herrera sent at 9/17/2019 10:34 AM CDT -----  Contact: Cristobal kelly [facility] [employer] @ [phone]   Caller requesting a return call about a prescription received, pls call to discuss

## 2019-09-18 NOTE — TELEPHONE ENCOUNTER
Rep for device, Araceli Trio, new neuromodulation device for essential tremor was just calling to check in on how patient is doing.      Device has to be calibrated and it is patient controlled.    Rep. is asking if he can come in and inservice us sometime on it. Used for essential tremor patients

## 2019-09-20 DIAGNOSIS — G20.A1 PARKINSON'S DISEASE: ICD-10-CM

## 2019-09-20 RX ORDER — BENZTROPINE MESYLATE 0.5 MG/1
0.5 TABLET ORAL 2 TIMES DAILY
Qty: 60 TABLET | Refills: 11 | Status: SHIPPED | OUTPATIENT
Start: 2019-09-20 | End: 2019-10-11

## 2019-09-27 DIAGNOSIS — R25.1 TREMOR: ICD-10-CM

## 2019-09-27 RX ORDER — MIRTAZAPINE 15 MG/1
15 TABLET, FILM COATED ORAL NIGHTLY
Qty: 30 TABLET | Refills: 5 | Status: SHIPPED | OUTPATIENT
Start: 2019-09-27 | End: 2020-03-30 | Stop reason: SDUPTHER

## 2019-10-09 ENCOUNTER — PATIENT MESSAGE (OUTPATIENT)
Dept: NEUROLOGY | Facility: CLINIC | Age: 49
End: 2019-10-09

## 2019-10-09 DIAGNOSIS — G20.A1 PARKINSON'S DISEASE: ICD-10-CM

## 2019-10-09 DIAGNOSIS — R25.1 TREMOR: Primary | ICD-10-CM

## 2019-10-11 ENCOUNTER — PATIENT MESSAGE (OUTPATIENT)
Dept: NEUROLOGY | Facility: CLINIC | Age: 49
End: 2019-10-11

## 2019-10-11 RX ORDER — BENZTROPINE MESYLATE 1 MG/1
1 TABLET ORAL 2 TIMES DAILY
Qty: 60 TABLET | Refills: 11 | Status: SHIPPED | OUTPATIENT
Start: 2019-10-11 | End: 2020-07-02

## 2019-10-11 RX ORDER — PRIMIDONE 50 MG/1
50 TABLET ORAL NIGHTLY
Qty: 30 TABLET | Refills: 11 | Status: SHIPPED | OUTPATIENT
Start: 2019-10-11 | End: 2020-07-02

## 2019-11-05 ENCOUNTER — PATIENT MESSAGE (OUTPATIENT)
Dept: NEUROLOGY | Facility: CLINIC | Age: 49
End: 2019-11-05

## 2019-12-02 ENCOUNTER — PATIENT MESSAGE (OUTPATIENT)
Dept: NEUROLOGY | Facility: CLINIC | Age: 49
End: 2019-12-02

## 2019-12-12 ENCOUNTER — LAB VISIT (OUTPATIENT)
Dept: LAB | Facility: HOSPITAL | Age: 49
End: 2019-12-12
Attending: INTERNAL MEDICINE
Payer: COMMERCIAL

## 2019-12-12 DIAGNOSIS — E03.9 HYPOTHYROIDISM, UNSPECIFIED TYPE: ICD-10-CM

## 2019-12-12 LAB
T4 FREE SERPL-MCNC: 1.12 NG/DL (ref 0.71–1.51)
TSH SERPL DL<=0.005 MIU/L-ACNC: 0.53 UIU/ML (ref 0.4–4)

## 2019-12-12 PROCEDURE — 84439 ASSAY OF FREE THYROXINE: CPT

## 2019-12-12 PROCEDURE — 84443 ASSAY THYROID STIM HORMONE: CPT

## 2019-12-12 PROCEDURE — 36415 COLL VENOUS BLD VENIPUNCTURE: CPT | Mod: PO

## 2019-12-19 ENCOUNTER — PROCEDURE VISIT (OUTPATIENT)
Dept: NEUROLOGY | Facility: CLINIC | Age: 49
End: 2019-12-19
Payer: COMMERCIAL

## 2019-12-19 ENCOUNTER — OFFICE VISIT (OUTPATIENT)
Dept: ENDOCRINOLOGY | Facility: CLINIC | Age: 49
End: 2019-12-19
Payer: COMMERCIAL

## 2019-12-19 VITALS
WEIGHT: 128 LBS | DIASTOLIC BLOOD PRESSURE: 63 MMHG | HEIGHT: 63 IN | HEART RATE: 84 BPM | SYSTOLIC BLOOD PRESSURE: 130 MMHG | BODY MASS INDEX: 22.68 KG/M2

## 2019-12-19 VITALS
OXYGEN SATURATION: 99 % | DIASTOLIC BLOOD PRESSURE: 60 MMHG | HEIGHT: 63 IN | BODY MASS INDEX: 22.46 KG/M2 | WEIGHT: 126.75 LBS | HEART RATE: 78 BPM | SYSTOLIC BLOOD PRESSURE: 100 MMHG

## 2019-12-19 DIAGNOSIS — E03.9 HYPOTHYROIDISM, UNSPECIFIED TYPE: Primary | ICD-10-CM

## 2019-12-19 DIAGNOSIS — G20.A1 PARKINSON'S DISEASE: Primary | ICD-10-CM

## 2019-12-19 DIAGNOSIS — G24.8 LIMB DYSTONIA: ICD-10-CM

## 2019-12-19 PROCEDURE — 3008F PR BODY MASS INDEX (BMI) DOCUMENTED: ICD-10-PCS | Mod: CPTII,S$GLB,, | Performed by: INTERNAL MEDICINE

## 2019-12-19 PROCEDURE — 99213 OFFICE O/P EST LOW 20 MIN: CPT | Mod: S$GLB,,, | Performed by: INTERNAL MEDICINE

## 2019-12-19 PROCEDURE — 99999 PR PBB SHADOW E&M-EST. PATIENT-LVL III: ICD-10-PCS | Mod: PBBFAC,,, | Performed by: INTERNAL MEDICINE

## 2019-12-19 PROCEDURE — 99214 PR OFFICE/OUTPT VISIT, EST, LEVL IV, 30-39 MIN: ICD-10-PCS | Mod: 25,S$GLB,, | Performed by: PSYCHIATRY & NEUROLOGY

## 2019-12-19 PROCEDURE — 99214 OFFICE O/P EST MOD 30 MIN: CPT | Mod: 25,S$GLB,, | Performed by: PSYCHIATRY & NEUROLOGY

## 2019-12-19 PROCEDURE — 3008F BODY MASS INDEX DOCD: CPT | Mod: CPTII,S$GLB,, | Performed by: INTERNAL MEDICINE

## 2019-12-19 PROCEDURE — 99213 PR OFFICE/OUTPT VISIT, EST, LEVL III, 20-29 MIN: ICD-10-PCS | Mod: S$GLB,,, | Performed by: INTERNAL MEDICINE

## 2019-12-19 PROCEDURE — 64642 CHEMODENERV 1 EXTREMITY 1-4: CPT | Mod: S$GLB,,, | Performed by: PSYCHIATRY & NEUROLOGY

## 2019-12-19 PROCEDURE — 99999 PR PBB SHADOW E&M-EST. PATIENT-LVL III: CPT | Mod: PBBFAC,,, | Performed by: INTERNAL MEDICINE

## 2019-12-19 PROCEDURE — 64642 PR CHEMODENERV ONE EXTREMITY; 1-4 MUSCLE(S): ICD-10-PCS | Mod: S$GLB,,, | Performed by: PSYCHIATRY & NEUROLOGY

## 2019-12-19 NOTE — ASSESSMENT & PLAN NOTE
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  Has failed azilect, levodopa, benztropine and primidone.  Growing interested in DBS as she has seen the negative effects of medications.   -> ok to taper off benztropine and primidone   -> botox trial today (see proc note below)   -> consider agonist trial   -> DBS candidate for right gpi; patient considering

## 2019-12-19 NOTE — PROCEDURES
"  Nathalie LAL Chief Complaints during this visit:  f/u Patient visit for  Parkinsonism, limb dystonia    Dariela Leonardo MD  [address] [place] [address]    Primary Care Physician  Osito Sykes, DO  [address] [place] [address]        History of present illness:   49 y.o. W seen in f/u for Parkinson's Disease.  Accompanied by .  We are trying botox for left arm dystonic tremor today.  "If the tremor weren't there, I'd be fine."    Wants to get off of benztropine and primidone as neither effective.    Still reluctant about DBS as uncomfortable with the thought of hardware in her body, but open to discussing today.      8/29/19  Tried artane, amantadine without effects.  Left hand tremors worse, more apraxia.  Toe curling has been much better.  Wonders about botox left hand, third digit in particularly.  Also wants to try Araceli Trio wrist device.    4/18/19:  VIRTUAL VISIT.  On azilect for third month.  No s/e, so she has started weaning off due to cost.    Interval history 4/2018:  ... seen in consultation at the request of  Dr. Cho for evaluation of parkinsonism.  She was seen by Martir last year and has had multiple other neurologic second opinions ([place], [place], [facility]).  Now looking for a home-base neurologist.  She would like to stay off medications as long as possible (mostly because of the trials and trauma seen in her father).  Tried one sinemet morning and evening.  No nausea and no effect.  Mucina puriens twice a day seemed to reduce the tightness.  Recognizes anxiety is a big trigger for her.  Insomnia triggers.  CBD oil were not helpful.  Off lexapro due to tremors.  Two docs have consider remeron.  3-4 years ago, had an action tremor at work, intermittently at work (with stained glass).  First 3 docs thought it was ET, offered meds that she declined.   Now the tremor is present most of the time, not just action tremor.   Diagnosed by  with either " dopa-responsive dystonia vs PD.  She really struggled with diagnosis and could not reach .  This spiraled into insomnia, anxiety.  Unique tried botox in left arm and foot.  Suggested amantadine, azilect.  Her father had PD.  She did the 23&Me, negative for LRRK.  Had whole genome at [facility], still pending.  Former symphony Bass player.  Has Hashimotos and luisito barr.      Interval history 6/14/17:  Patient states that she had insomnia about a week after he saw us. Started on Lexapro and Xannax by primary care doctor. Anxiety has been very up and down.   Patient has not been sleeping. She has not been sleeping. Tremors have been worse since last visit. She is not sure if anxiety is making things worse.   Has not started CD/LD yet   She is also loosing weight.   Tightness in leg is worse.   She is very concerned about not sleeping. Has been about two weeks now.         History of Present Illness (HPI):  RH female of mixed southern  and mediterranean descent. Pt has had a left hand tremor for about a year and a half now. Her father had PD, dx in 60s and passed in his 70s. She has seen Dr. Norman as well on the [facility]. She has also had a strange feeling in her left leg. It always feels tense, like she has to consciously relax it. No decrease in mobility or change in strength. She thinks this has not gotten any worse in the last few months but she gets a feeling as if her toes want to curl down as well. Tremor in the left hand can sometimes involve the arm, she feels like she has lost some strength in the left hand too. Tremor increases with stress. Not present at rest, only present during action. No feeling of pulling or twisting in the left hand or arm. Sometimes the left eye twitches. Has known bony abnormality of the neck that she reports as well.     II.  Review of systems:  As in HPI,  otherwise, balance 3 systems reviewed and are negative.    III.  Past Medical History:   Diagnosis Date    Allergy      Hypothyroidism     Sleep apnea      Family History   Problem Relation Age of Onset    Parkinsonism Father     Breast cancer Maternal Aunt     Ovarian cancer Maternal Aunt     Allergies Mother     Angioedema Neg Hx     Asthma Neg Hx     Atopy Neg Hx     Eczema Neg Hx     Immunodeficiency Neg Hx     Rhinitis Neg Hx     Urticaria Neg Hx      Social history:  , artist works in stained glass      Current Outpatient Medications on File Prior to Visit   Medication Sig Dispense Refill    cholecalciferol, vitamin D3, 5,000 unit/mL Drop Take 50,000 Units by mouth every 7 days.       coenzyme Q10 100 mg capsule Take 100 mg by mouth.      cyanocobalamin 1,000 mcg/mL injection Inject 1 mL (1,000 mcg total) into the muscle every 28 days. 10 mL 1    FLUZONE QUAD [id number], PF, 60 mcg (15 mcg x 4)/0.5 mL Syrg TO BE ADMINISTERED BY PHARMACIST FOR IMMUNIZATION  0    levothyroxine (SYNTHROID) 100 MCG tablet Take 1 tablet (100 mcg total) by mouth once daily. 30 tablet 11    liothyronine (CYTOMEL) 5 MCG Tab TAKE 1 TABLET (5 MCG TOTAL) BY MOUTH ONCE DAILY 30 tablet 6    mirtazapine (REMERON) 15 MG tablet TAKE 1 TABLET (15 MG TOTAL) BY MOUTH EVERY EVENING. 30 tablet 5    multivitamin (THERAGRAN) per tablet Take 1 tablet by mouth.      primidone (MYSOLINE) 50 MG Tab Take 1 tablet (50 mg total) by mouth every evening. 30 tablet 11    benztropine (COGENTIN) 1 MG tablet Take 1 tablet (1 mg total) by mouth 2 (two) times daily. 60 tablet 11     Current Facility-Administered Medications on File Prior to Visit   Medication Dose Route Frequency Provider Last Rate Last Dose    onabotulinumtoxina injection 100 Units  100 Units Intramuscular Q90 Days Dariela Leonardo MD           PRIOR problem-specific medications tried:  Sinemet bid not helpful, azilect, artane, amantadine (not helpful), benztropine, primidone      Review of patient's allergies indicates:   Allergen Reactions    Codeine     Hydrocodone     Sulfa  "(sulfonamide antibiotics)        IV. Physical Exam    Vitals:    12/19/19 0928   BP: 130/63   Pulse: 84   Weight: 58.1 kg (128 lb)   Height: 5' 3" (1.6 m)     General appearance: Well nourished, well developed, no acute distress.           -------------------------------------------------------------  Facial Expression: 1: Slight: Minimal masked facies manifested only by decreased frequency of blinking.       Affect: full       Orientation to time & place:  Oriented to time, place, person and situation       Attention & concentration:  Normal attention span and concentration       Fund of knowledge:  Aware of current events        Speech:  normal (not dysarthric)  -------------------------------------------------------  Cranial nerves: wears glasses, face symmetrical, hearing intact        -------------------------------------------------------   MOVEMENT DISORDERS FOCUSED EXAM  Abnormality of movement (bradykinesia, hyperkinesia) present? Yes, bradykinetic, left posturing hand   Tremor present?   Yes, resting tremor left hand and foot, arrests with certain postures      V.  Laboratory/ Radiological Data:          Lab Results   Component Value Date    TSH 0.531 12/12/2019     Lab Results   Component Value Date    [id number] 1001 (H) 03/11/2016         VI. Assessment and Plan            Problem List Items Addressed This Visit        1 - High    Parkinson's disease - Primary    Overview     2013 Bradykinesia and left resting tremor.         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  Has failed azilect, levodopa, benztropine and primidone.  Growing interested in DBS as she has seen the negative effects of medications.   -> ok to taper off benztropine and primidone   -> botox trial today (see proc note below)   -> consider agonist trial   -> DBS candidate for right gpi; patient considering            2     Limb dystonia    Overview     Left foot, arm.         Current Assessment " "& Plan     botox today, left arm.                     Follow up in about 3 months (around 3/19/2020) for PD, botox.       Clinic Documentation for botulinum Injection   Nathalie Rodriguez   1970       Nathalie Rodriguez was seen in clinic today for botulinum injections for the treatment of:  1. Parkinson's disease    2. Limb dystonia        Botulinum injections are medically necessary for this patient, due to pain in lue from dystonia and to restore function.      PHYSICAL EXAM (FOCUSED):    Vitals:    12/19/19 0928   BP: 130/63   Pulse: 84   Weight: 58.1 kg (128 lb)   Height: 5' 3" (1.6 m)       healthy, alert, no distress      ASSESSMENT:  Dystonic tremor, LUE, appropriate for injections.    PLAN/PROCEDURE:  After explaining the most frequently reported adverse reactions in patients with injections into limb muscles, I answered all the patient's questions and consent was obtained.       Two patient identifiers were confirmed with the patient prior to performing this procedure. A time out to determine correct patient and and agreement on procedure performed was conducted prior to the procedure.     The goal of the injections will be to reduce spasms.   Using a 30 gauge injection needle and aseptic technique the following muscles were identified and injected with BOTOX.     BOTULINUM  INJECTION PROCEDURE:   Dilution: 100 units in 1cc normal saline      For limb dystonia, I injected 20 units into extensor carpi ulnaris and 10 units into flexor carpi ulnaris.        Patient supplied botulinum toxin?  no  Total amount of toxin used:  30 units  Total amount of toxin wasted:  70 units    "

## 2019-12-19 NOTE — PROGRESS NOTES
CHIEF COMPLAINT: Hypothyroid  49 year old being seen as a f/u. Hypothyroidism x 25 years. On synthroid 100 mcg and cytomel 5 mcg daily. Has chronic tremors. Seeing neurology. No palpitations.           PAST MEDICAL HISTORY/PAST SURGICAL HISTORY:  Reviewed in [facility]    SOCIAL HISTORY: No Tobacco. Social alcohol.     FAMILY HISTORY:  Sister hypothyroid. No DM 2. Parkinsons.     MEDICATIONS/ALLERGIES: The patient's MedCard has been updated and reviewed.      ROS:   Constitutional: weight stable.   Eyes: No recent visual changes  ENT: No dysphagia  Cardiovascular: Denies current anginal symptoms  Respiratory: Denies current respiratory difficulty  Gastrointestinal: has constipation.   GenitoUrinary - No dysuria  Skin: No new skin rash  Neurologic: No focal neurologic complaints  Remainder ROS negative        PE:    GENERAL: Well developed, well nourished.  NECK: Supple, trachea midline, No palpable thyroid nodules  CHEST: Resp even and unlabored, CTA bilateral.  CARDIAC: RRR, S1, S2 heard, no murmurs, rubs, S3, or S4      Results for DARIELA BRANDON GARNETT (MRN [id number]) as of 12/19/2019 09:02   Ref. Range 12/12/2019 09:02   TSH Latest Ref Range: 0.400 - 4.000 uIU/mL 0.531   Free T4 Latest Ref Range: 0.71 - 1.51 ng/dL 1.12             ASSESSMENT/PLAN:  1. Hypothyroidism- TSH WNL. Symptomatically doing well. Seeing neurology for parkinsonian tremors. Continue current Tx.      FOLLOWUP  F/U 1 year- TSH, Ft4

## 2019-12-20 ENCOUNTER — PATIENT MESSAGE (OUTPATIENT)
Dept: NEUROLOGY | Facility: CLINIC | Age: 49
End: 2019-12-20

## 2019-12-29 ENCOUNTER — PATIENT MESSAGE (OUTPATIENT)
Dept: NEUROLOGY | Facility: CLINIC | Age: 49
End: 2019-12-29

## 2020-01-09 ENCOUNTER — PATIENT MESSAGE (OUTPATIENT)
Dept: NEUROLOGY | Facility: CLINIC | Age: 50
End: 2020-01-09

## 2020-01-09 DIAGNOSIS — G20.A1 PARKINSON'S DISEASE: Primary | ICD-10-CM

## 2020-01-10 ENCOUNTER — PATIENT MESSAGE (OUTPATIENT)
Dept: NEUROLOGY | Facility: CLINIC | Age: 50
End: 2020-01-10

## 2020-01-20 RX ORDER — LEVOTHYROXINE SODIUM 100 UG/1
100 TABLET ORAL DAILY
Qty: 90 TABLET | Refills: 3 | Status: SHIPPED | OUTPATIENT
Start: 2020-01-20 | End: 2020-08-04 | Stop reason: SDUPTHER

## 2020-01-21 ENCOUNTER — OFFICE VISIT (OUTPATIENT)
Dept: NEUROLOGY | Facility: CLINIC | Age: 50
End: 2020-01-21
Payer: COMMERCIAL

## 2020-01-21 VITALS
DIASTOLIC BLOOD PRESSURE: 70 MMHG | HEART RATE: 74 BPM | HEIGHT: 63 IN | SYSTOLIC BLOOD PRESSURE: 123 MMHG | BODY MASS INDEX: 22.67 KG/M2

## 2020-01-21 DIAGNOSIS — G20.A1 PARKINSON'S DISEASE: Primary | ICD-10-CM

## 2020-01-21 PROCEDURE — 99999 PR PBB SHADOW E&M-EST. PATIENT-LVL III: ICD-10-PCS | Mod: PBBFAC,,, | Performed by: NURSE PRACTITIONER

## 2020-01-21 PROCEDURE — 3008F BODY MASS INDEX DOCD: CPT | Mod: CPTII,S$GLB,, | Performed by: NURSE PRACTITIONER

## 2020-01-21 PROCEDURE — 99999 PR PBB SHADOW E&M-EST. PATIENT-LVL III: CPT | Mod: PBBFAC,,, | Performed by: NURSE PRACTITIONER

## 2020-01-21 PROCEDURE — 99215 OFFICE O/P EST HI 40 MIN: CPT | Mod: S$GLB,,, | Performed by: NURSE PRACTITIONER

## 2020-01-21 PROCEDURE — 3008F PR BODY MASS INDEX (BMI) DOCUMENTED: ICD-10-PCS | Mod: CPTII,S$GLB,, | Performed by: NURSE PRACTITIONER

## 2020-01-21 PROCEDURE — 99215 PR OFFICE/OUTPT VISIT, EST, LEVL V, 40-54 MIN: ICD-10-PCS | Mod: S$GLB,,, | Performed by: NURSE PRACTITIONER

## 2020-01-21 NOTE — PROGRESS NOTES
"Name: Nathalie Rodriguez  MRN: [id number]   CSN: [id number]      Date: 1-      Referring physician:  Buddy Henley  No address on file    Subjective:      Chief Complaint: DBS education    History of Present Illness (HPI):    Nathalie Rodriguez is a 49 y.o. right-handed female who presents today for DBS education for the treatment of Parkinson's Disease and is accompanied by .     She is exploring DBS as an option. She is going to [place] tomorrow to see about their DBS program.    Expectations:  "Want tremor gone."- completely gone. When questioned further, she offers that she realizes this may not be attainable but is definitley looking for at least 75-80% improvement if the tremor cannot be gone.  Stiffness is something she experiences and would not mind if it improved but it is "mainly about the tremor."     Went to [place] to talk about fucus guided US.     Exercise a lot and this makes tremor so much worse. Stays amped up for awhile after exercise.      Tremor started as an action tremor.     She has tried carbidopa/levodopa 3 times in past without success.         Review of Systems    Past Medical History: The patient  has a past medical history of Allergy, Hypothyroidism, and Sleep apnea.    Social History: The patient  reports that she has never smoked. She has never used smokeless tobacco. She reports that she drinks alcohol. She reports that she does not use drugs.    Family History: Their family history includes Allergies in her mother; Breast cancer in her maternal aunt; Ovarian cancer in her maternal aunt; Parkinsonism in her father.    Allergies: Codeine; Hydrocodone; and Sulfa (sulfonamide antibiotics)     Meds:   Current Outpatient Medications on File Prior to Visit   Medication Sig Dispense Refill    cholecalciferol, vitamin D3, 5,000 unit/mL Drop Take 50,000 Units by mouth every 7 days.       coenzyme Q10 100 mg capsule Take 100 mg by mouth.      cyanocobalamin 1,000 mcg/mL " "injection Inject 1 mL (1,000 mcg total) into the muscle every 28 days. 10 mL 1    levothyroxine (SYNTHROID) 100 MCG tablet Take 1 tablet (100 mcg total) by mouth once daily. 90 tablet 3    liothyronine (CYTOMEL) 5 MCG Tab TAKE 1 TABLET (5 MCG TOTAL) BY MOUTH ONCE DAILY 30 tablet 6    mirtazapine (REMERON) 15 MG tablet TAKE 1 TABLET (15 MG TOTAL) BY MOUTH EVERY EVENING. 30 tablet 5    multivitamin (THERAGRAN) per tablet Take 1 tablet by mouth.      benztropine (COGENTIN) 1 MG tablet Take 1 tablet (1 mg total) by mouth 2 (two) times daily. 60 tablet 11    FLUZONE QUAD 3746-3632, PF, 60 mcg (15 mcg x 4)/0.5 mL Syrg TO BE ADMINISTERED BY PHARMACIST FOR IMMUNIZATION  0    primidone (MYSOLINE) 50 MG Tab Take 1 tablet (50 mg total) by mouth every evening. 30 tablet 11     Current Facility-Administered Medications on File Prior to Visit   Medication Dose Route Frequency Provider Last Rate Last Dose    onabotulinumtoxina injection 100 Units  100 Units Intramuscular Q90 Days Dariela Leonardo MD   100 Units at 12/19/19 1224       Objective:     Physical Exam:    Vitals:    01/21/20 0921   BP: 123/70   BP Location: Left arm   Patient Position: Sitting   Pulse: 74   Height: 5' 3" (1.6 m)     Body mass index is 22.67 kg/m².    Constitutional  Well-developed, well-nourished, appears stated age     Note is made of rest tremor in .  Otherwise, entirety of visit spent in education about DBS.       Laboratory Results:  Lab Visit on 12/12/2019   Component Date Value Ref Range Status    TSH 12/12/2019 0.531  0.400 - 4.000 uIU/mL Final    Free T4 12/12/2019 1.12  0.71 - 1.51 ng/dL Final           Assessment and Plan     Parkinson's disease        Medical Decision Making:    Expectations:  "Want tremor gone."- completely gone. When questioned further, she offers that she realizes this may not be attainable but is definitley looking for at least 75-80% improvement if the tremor cannot be gone.  Stiffness is something she " "experiences and would not mind if it improved but it is "mainly about the tremor."     When discussing Neuropsychological evaluation, she is very hesitant and does not see the purpose of having this. She asks why it is necessary when if there is decline in a few years, nothing can be done. Explained that we use this to assure there are no other issues as well as to assure realistic expectations, okay to give informed consent, and help guide choosing appropriate target. I have been active in DBS programs for over a decade. I have explained that I have never worked in a program that did not require Neuropsychological evaluation in anticipation of DBS for the treatment of Parkinson's disease. She talks about the the stress this causes her to think about this and the impact would have on her tremor. While I certainly appreciate and respect her feelings, Neuropsychological evaluation is a requirement for our program.         ..Reviewed in detail each step of the process   -neuropsychological evaluation- 2-- she requests to meet surgeon before doing this as wants to assure she   likes surgeon before proceeding   -pre-op clearance as outlined on check list- not given yet as no date given for OR   -MRI brain to be done here- TBD   -consult with Dr. Lloyd-will send chart to Jannette to schedule   -Stage 1 detailed    -arrive day before surgery    -including head shaved, fiducials    -awake for stage 1    -don hole(s)    -risks of surgery- including but not limited to  infection, bleeding, seizure,stroke, death    -arrival date given-TBD    -anticipated discharge date-TBD   Stage 2 detailed    -return one week later for outpatient surgery to place hardware    -general anesthesia     -OR date given-TBD    -return to office within few weeks of Stage 2 to have battery turned on and programmed    -discussed lengthy process that programming can be for optimization  Questions answered.  Should they have questions prior to " "surgery, they will email me or Dr. Kong.        I do have concerns about Mrs. Rodriguez as DBS candidate. She may have heightened expectations about level of tremor control as is very clear she wants tremor "gone."    I spent 60 minutes face-to-face with the patient with >50% of the time spent with counseling and education regarding:  - results of data, diagnosis, and recommendations stated above  - the prognosis of Parkinson's disease and DBS  - risks and benefits of DBS  - importance of diet and exercise    Josiane Andrade, CHAPARRITA, NP-C  Division of Movement and Memory Disorders  [facility]  [phone]      "

## 2020-01-21 NOTE — Clinical Note
Pt considering DBS as option. She would like to meet Dr. Lloyd before deciding. Jannette, can you please schedule consult for her. Thanks!

## 2020-01-30 ENCOUNTER — TELEPHONE (OUTPATIENT)
Dept: NEUROSURGERY | Facility: CLINIC | Age: 50
End: 2020-01-30

## 2020-01-30 ENCOUNTER — PATIENT MESSAGE (OUTPATIENT)
Dept: NEUROLOGY | Facility: CLINIC | Age: 50
End: 2020-01-30

## 2020-01-30 NOTE — TELEPHONE ENCOUNTER
I contacted the pt and LM requesting a call back to discuss scheduling.  The department number was left.  ----- Message from Jannette Haq MA sent at 1/21/2020  2:15 PM CST -----      ----- Message -----  From: Josiane Andrade NP  Sent: 1/21/2020   1:48 PM CST  To: Jannette Haq MA    Pt considering DBS as option. She would like to meet Dr. Lloyd before deciding. Jannette, can you please schedule consult for her. Thanks!

## 2020-02-05 ENCOUNTER — PATIENT MESSAGE (OUTPATIENT)
Dept: NEUROLOGY | Facility: CLINIC | Age: 50
End: 2020-02-05

## 2020-02-05 DIAGNOSIS — F40.243 FEAR OF FLYING: Primary | ICD-10-CM

## 2020-02-06 ENCOUNTER — PATIENT MESSAGE (OUTPATIENT)
Dept: NEUROLOGY | Facility: CLINIC | Age: 50
End: 2020-02-06

## 2020-02-06 RX ORDER — ALPRAZOLAM 0.5 MG/1
.5-1.5 TABLET ORAL 2 TIMES DAILY PRN
Qty: 21 TABLET | Refills: 0 | Status: SHIPPED | OUTPATIENT
Start: 2020-02-06 | End: 2020-07-02

## 2020-03-13 RX ORDER — LIOTHYRONINE SODIUM 5 UG/1
5 TABLET ORAL DAILY
Qty: 90 TABLET | Refills: 2 | Status: SHIPPED | OUTPATIENT
Start: 2020-03-13 | End: 2020-11-09 | Stop reason: SDUPTHER

## 2020-03-30 DIAGNOSIS — R25.1 TREMOR: ICD-10-CM

## 2020-03-30 RX ORDER — MIRTAZAPINE 15 MG/1
15 TABLET, FILM COATED ORAL NIGHTLY
Qty: 30 TABLET | Refills: 5 | Status: SHIPPED | OUTPATIENT
Start: 2020-03-30 | End: 2021-04-07

## 2020-07-09 NOTE — TELEPHONE ENCOUNTER
Spoke to pt she stated she does not want the armour thyroid anymore wants to go back to synthroid. Only wants 1 mth supply sent to [facility] due to having labs done tomorrow in case dose needs adjusting. Made lab and f/u appt , adv date, time and location, voiced understanding.    Spoke with Dr. Irlanda Riggins today and he states there is not much more that he can do for patient and is asking about a referral to specialty at [facility] for Rheumatology.

## 2020-08-04 ENCOUNTER — PATIENT MESSAGE (OUTPATIENT)
Dept: ENDOCRINOLOGY | Facility: CLINIC | Age: 50
End: 2020-08-04

## 2020-08-04 ENCOUNTER — TELEPHONE (OUTPATIENT)
Dept: ENDOCRINOLOGY | Facility: CLINIC | Age: 50
End: 2020-08-04

## 2020-08-04 RX ORDER — LEVOTHYROXINE SODIUM 100 UG/1
100 TABLET ORAL DAILY
Qty: 90 TABLET | Refills: 3 | Status: SHIPPED | OUTPATIENT
Start: 2020-08-04 | End: 2020-08-05 | Stop reason: SDUPTHER

## 2020-08-05 ENCOUNTER — TELEPHONE (OUTPATIENT)
Dept: ENDOCRINOLOGY | Facility: CLINIC | Age: 50
End: 2020-08-05

## 2020-08-05 RX ORDER — LEVOTHYROXINE SODIUM 100 UG/1
100 TABLET ORAL DAILY
Qty: 90 TABLET | Refills: 3 | Status: SHIPPED | OUTPATIENT
Start: 2020-08-05 | End: 2020-09-25

## 2020-09-14 ENCOUNTER — LAB VISIT (OUTPATIENT)
Dept: LAB | Facility: HOSPITAL | Age: 50
End: 2020-09-14
Attending: INTERNAL MEDICINE
Payer: COMMERCIAL

## 2020-09-14 DIAGNOSIS — E03.9 HYPOTHYROIDISM, UNSPECIFIED TYPE: ICD-10-CM

## 2020-09-14 LAB
T4 FREE SERPL-MCNC: 1.21 NG/DL (ref 0.71–1.51)
TSH SERPL DL<=0.005 MIU/L-ACNC: 0.06 UIU/ML (ref 0.4–4)

## 2020-09-14 PROCEDURE — 36415 COLL VENOUS BLD VENIPUNCTURE: CPT | Mod: PO

## 2020-09-14 PROCEDURE — 84439 ASSAY OF FREE THYROXINE: CPT

## 2020-09-14 PROCEDURE — 84443 ASSAY THYROID STIM HORMONE: CPT

## 2020-09-25 ENCOUNTER — OFFICE VISIT (OUTPATIENT)
Dept: ENDOCRINOLOGY | Facility: CLINIC | Age: 50
End: 2020-09-25
Payer: COMMERCIAL

## 2020-09-25 DIAGNOSIS — E03.9 HYPOTHYROIDISM, UNSPECIFIED TYPE: Primary | ICD-10-CM

## 2020-09-25 PROCEDURE — 99213 OFFICE O/P EST LOW 20 MIN: CPT | Mod: 95,,, | Performed by: INTERNAL MEDICINE

## 2020-09-25 PROCEDURE — 99213 PR OFFICE/OUTPT VISIT, EST, LEVL III, 20-29 MIN: ICD-10-PCS | Mod: 95,,, | Performed by: INTERNAL MEDICINE

## 2020-09-25 RX ORDER — LEVOTHYROXINE SODIUM 88 UG/1
88 TABLET ORAL
Qty: 30 TABLET | Refills: 11 | Status: SHIPPED | OUTPATIENT
Start: 2020-09-25 | End: 2020-09-25

## 2020-09-25 RX ORDER — LEVOTHYROXINE SODIUM 88 UG/1
88 TABLET ORAL
Qty: 30 TABLET | Refills: 11 | Status: SHIPPED | OUTPATIENT
Start: 2020-09-25 | End: 2020-11-09 | Stop reason: SDUPTHER

## 2020-09-25 NOTE — PROGRESS NOTES
The patient location is: [place]    Visit type: audiovisual    Face to Face time with patient: 12  15 minutes of total time spent on the encounter, which includes face to face time and non-face to face time preparing to see the patient (eg, review of tests), Obtaining and/or reviewing separately obtained history, Documenting clinical information in the electronic or other health record, Independently interpreting results (not separately reported) and communicating results to the patient/family/caregiver, or Care coordination (not separately reported).         Each patient to whom he or she provides medical services by telemedicine is:  (1) informed of the relationship between the physician and patient and the respective role of any other health care provider with respect to management of the patient; and (2) notified that he or she may decline to receive medical services by telemedicine and may withdraw from such care at any time.    Notes:     CHIEF COMPLAINT: Hypothyroid  50 year old being seen as a f/u. Hypothyroidism x 25 years. On synthroid 100 mcg and cytomel 5 mcg daily. No palpitations. Had a brain stimulator in July 2020. Has some insomnia and fatigue.           PAST MEDICAL HISTORY/PAST SURGICAL HISTORY:  Reviewed in [facility]    SOCIAL HISTORY: No Tobacco. Social alcohol.     FAMILY HISTORY:  Sister hypothyroid. No DM 2. Parkinsons.     MEDICATIONS/ALLERGIES: The patient's MedCard has been updated and reviewed.      ROS:   Constitutional: weight stable.   Eyes: No recent visual changes  ENT: No dysphagia  Cardiovascular: Denies current anginal symptoms  Respiratory: Denies current respiratory difficulty  Gastrointestinal: has constipation.   GenitoUrinary - No dysuria  Skin: No new skin rash  Neurologic: No focal neurologic complaints  Remainder ROS negative        PE:  Virtual Visit      Results for BRANDON LYLE (MRN [id number]) as of 9/25/2020 15:47   Ref. Range 9/14/2020 11:49   TSH Latest Ref Range:  0.400 - 4.000 uIU/mL 0.059 (L)   Free T4 Latest Ref Range: 0.71 - 1.51 ng/dL 1.21             ASSESSMENT/PLAN:  1. Hypothyroidism- TSH suppressed. Decrease synthroid to 88 mcg daily.      FOLLOWUP  TSH, Ft4, T3- 6 weeks  F/U 1 year- TSH, Ft4, T3.

## 2020-09-29 DIAGNOSIS — M25.511 RIGHT SHOULDER PAIN, UNSPECIFIED CHRONICITY: Primary | ICD-10-CM

## 2020-09-30 ENCOUNTER — OFFICE VISIT (OUTPATIENT)
Dept: ORTHOPEDICS | Facility: CLINIC | Age: 50
End: 2020-09-30
Payer: COMMERCIAL

## 2020-09-30 VITALS — BODY MASS INDEX: 23.19 KG/M2 | HEIGHT: 62 IN | WEIGHT: 126 LBS | RESPIRATION RATE: 18 BRPM

## 2020-09-30 DIAGNOSIS — M75.21 BICEPS TENDINITIS OF RIGHT UPPER EXTREMITY: Primary | ICD-10-CM

## 2020-09-30 PROCEDURE — 99213 PR OFFICE/OUTPT VISIT, EST, LEVL III, 20-29 MIN: ICD-10-PCS | Mod: S$GLB,,, | Performed by: ORTHOPAEDIC SURGERY

## 2020-09-30 PROCEDURE — 3008F PR BODY MASS INDEX (BMI) DOCUMENTED: ICD-10-PCS | Mod: CPTII,S$GLB,, | Performed by: ORTHOPAEDIC SURGERY

## 2020-09-30 PROCEDURE — 99999 PR PBB SHADOW E&M-EST. PATIENT-LVL III: CPT | Mod: PBBFAC,,, | Performed by: ORTHOPAEDIC SURGERY

## 2020-09-30 PROCEDURE — 99213 OFFICE O/P EST LOW 20 MIN: CPT | Mod: S$GLB,,, | Performed by: ORTHOPAEDIC SURGERY

## 2020-09-30 PROCEDURE — 99999 PR PBB SHADOW E&M-EST. PATIENT-LVL III: ICD-10-PCS | Mod: PBBFAC,,, | Performed by: ORTHOPAEDIC SURGERY

## 2020-09-30 PROCEDURE — 3008F BODY MASS INDEX DOCD: CPT | Mod: CPTII,S$GLB,, | Performed by: ORTHOPAEDIC SURGERY

## 2020-09-30 NOTE — PROGRESS NOTES
Past Medical History:   Diagnosis Date    Allergy     Hypothyroidism     Sleep apnea        Past Surgical History:   Procedure Laterality Date    KNEE SURGERY      bilateral    MOUTH SURGERY      NOSE SURGERY         Current Outpatient Medications   Medication Sig    cholecalciferol, vitamin D3, 5,000 unit/mL Drop Take 50,000 Units by mouth every 7 days.     coenzyme Q10 100 mg capsule Take 100 mg by mouth.    cyanocobalamin 1,000 mcg/mL injection Inject 1 mL (1,000 mcg total) into the muscle every 28 days.    FLUZONE QUAD [id number], PF, 60 mcg (15 mcg x 4)/0.5 mL Syrg TO BE ADMINISTERED BY PHARMACIST FOR IMMUNIZATION    levothyroxine (SYNTHROID) 88 MCG tablet Take 1 tablet (88 mcg total) by mouth before breakfast. Brand synthroid    liothyronine (CYTOMEL) 5 MCG Tab TAKE 1 TABLET (5 MCG TOTAL) BY MOUTH ONCE DAILY    mirtazapine (REMERON) 15 MG tablet Take 1 tablet (15 mg total) by mouth every evening.    multivitamin (THERAGRAN) per tablet Take 1 tablet by mouth.    carbidopa-levodopa  mg (SINEMET)  mg per tablet Take 2 tablets by mouth.    rotigotine (NEUPRO) 2 mg/24 hour Use 1 patch daily for 1 week, then 2 patches daily for 1 week, then 3 patches daily thereafter.    trihexyphenidyL (ARTANE) 2 MG tablet TAKE 1 TABLET BY MOUTH THREE TIMES A DAY     Current Facility-Administered Medications   Medication    onabotulinumtoxina injection 100 Units       Review of patient's allergies indicates:   Allergen Reactions    Codeine     Hydrocodone     Sulfa (sulfonamide antibiotics)        Family History   Problem Relation Age of Onset    Parkinsonism Father     Breast cancer Maternal Aunt     Ovarian cancer Maternal Aunt     Allergies Mother     Angioedema Neg Hx     Asthma Neg Hx     Atopy Neg Hx     Eczema Neg Hx     Immunodeficiency Neg Hx     Rhinitis Neg Hx     Urticaria Neg Hx        Social History     Socioeconomic History    Marital status:      Spouse name: Not on  file    Number of children: Not on file    Years of education: Not on file    Highest education level: Not on file   Occupational History    Not on file   Social Needs    Financial resource strain: Not hard at all    Food insecurity     Worry: Never true     Inability: Never true    Transportation needs     Medical: No     Non-medical: No   Tobacco Use    Smoking status: Never Smoker    Smokeless tobacco: Never Used   Substance and Sexual Activity    Alcohol use: Yes     Alcohol/week: 0.0 standard drinks     Frequency: 2-4 times a month     Drinks per session: 1 or 2     Binge frequency: Never     Comment: occasional    Drug use: No    Sexual activity: Not on file   Lifestyle    Physical activity     Days per week: 6 days     Minutes per session: 90 min    Stress: Only a little   Relationships    Social connections     Talks on phone: More than three times a week     Gets together: Twice a week     Attends Hinduism service: Not on file     Active member of club or organization: Patient refused     Attends meetings of clubs or organizations: Not on file     Relationship status:    Other Topics Concern    Not on file   Social History Narrative    Not on file       Chief Complaint:   Chief Complaint   Patient presents with    Right Shoulder - Pain       History of present illness:  This is a 50-year-old female seen for right shoulder pain.  Patient has had pain for several months.  Does not recall specific injury or trauma.  Gotten worse over the last month.  Pain pushing down with the arm.  Pain twisting reaching up.  It feels like something is slipping in and out of place.  Pain right over the bicipital groove.  Pain is 3/10.  No injury or trauma.  She does a lot of yoga.      Review of Systems:    Constitution: Negative for chills, fever, and sweats.  Negative for unexplained weight loss.    HENT:  Negative for headaches and blurry vision.    Cardiovascular:Negative for chest pain or  irregular heart beat. Negative for hypertension.    Respiratory:  Negative for cough and shortness of breath.    Gastrointestinal: Negative for abdominal pain, heartburn, melena, nausea, and vomitting.    Genitourinary:  Negative bladder incontinence and dysuria.    Musculoskeletal:  See HPI    Neurological: Negative for numbness.    Psychiatric/Behavioral: Negative for depression.  The patient is not nervous/anxious.      Endocrine: Negative for polyuria    Hematologic/Lymphatic: Negative for bleeding problem.  Does not bruise/bleed easily.    Skin: Negative for poor would healing and rash      Physical Examination:    Vital Signs:    Vitals:    09/30/20 1304   Resp: 18       Body mass index is 23.05 kg/m².    This a well-developed, well nourished patient in no acute distress.  They are alert and oriented and cooperative to examination.  Pt. walks without an antalgic gait.      Examination of the right shoulder shows no rashes or erythema. There are no masses, ecchymosis, or atrophy. The patient has full range of motion in forward flexion, external rotation, and internal rotation to the mid T-spine. The patient has - impingement signs.  Minimally positive Remus's test. - Speeds test.  Tenderness over the bicipital groove.  Some crepitus and popping with motion of the shoulder.  Nontender to palpation over a.c. joint. Normal stability anteriorly, posteriorly, and negative sulcus sign. Passive range of motion: Forward flexion of 180°, external rotation at 90° of 90°, internal rotation of 50°, and external rotation at 0° of 50°. 2+ radial pulse. Intact axillary, radial, median and ulnar sensation. 5 out of 5 resisted forward flexion, external rotation, and negative lift off test.    Examination of the left shoulder shows no rashes or erythema. There are no masses, ecchymosis, or atrophy. The patient has full range of motion in forward flexion, external rotation, and internal rotation to the mid T-spine. The patient  has - impingement signs. - Burleigh's test. - Speeds test. Nontender to palpation over a.c. joint. Normal stability anteriorly, posteriorly, and negative sulcus sign. Passive range of motion: Forward flexion of 180°, external rotation at 90° of 90°, internal rotation of 50°, and external rotation at 0° of 50°. 2+ radial pulse. Intact axillary, radial, median and ulnar sensation. 5 out of 5 resisted forward flexion, external rotation, and negative lift off test.          X-rays:  X-rays of the right shoulder are declined     Assessment:  Right biceps tendinitis.  Possible labral tear    Plan:  I reviewed the findings with her today.  I recommended an exercise guide and Thera-Band.  She might need an MRI if it continues to bother her she continues to feel the mechanical sensations.    This note was created using [employer] voice recognition software that occasionally misinterpreted phrases or words.    Consult note is delivered via Epic messaging service.       normal...

## 2020-10-07 ENCOUNTER — LAB VISIT (OUTPATIENT)
Dept: LAB | Facility: HOSPITAL | Age: 50
End: 2020-10-07
Attending: FAMILY MEDICINE
Payer: COMMERCIAL

## 2020-10-07 DIAGNOSIS — G20.A1 PARKINSON'S DISEASE: ICD-10-CM

## 2020-10-07 DIAGNOSIS — R79.83 HOMOCYSTEINEMIA: ICD-10-CM

## 2020-10-07 LAB
25(OH)D3+25(OH)D2 SERPL-MCNC: 39 NG/ML (ref 30–96)
HCYS SERPL-SCNC: 6.8 UMOL/L (ref 4–15.5)
VIT B12 SERPL-MCNC: 449 PG/ML (ref 210–950)

## 2020-10-07 PROCEDURE — 82607 VITAMIN B-12: CPT

## 2020-10-07 PROCEDURE — 82306 VITAMIN D 25 HYDROXY: CPT

## 2020-10-07 PROCEDURE — 36415 COLL VENOUS BLD VENIPUNCTURE: CPT | Mod: PO

## 2020-10-07 PROCEDURE — 83090 ASSAY OF HOMOCYSTEINE: CPT

## 2020-10-21 ENCOUNTER — TELEPHONE (OUTPATIENT)
Dept: NEUROLOGY | Facility: CLINIC | Age: 50
End: 2020-10-21

## 2020-10-21 NOTE — TELEPHONE ENCOUNTER
Spoke with Mrs. Rodriguez, she was informed I will have to speak with Dr. Garcia regarding a DBS appt in December.  Michael verbalized understanding

## 2020-10-21 NOTE — TELEPHONE ENCOUNTER
----- Message from Susanne Mendez sent at 10/21/2020 12:48 PM CDT -----  Regarding: appt  Contact: pt @ [phone]  Pt calling to speak with someone regarding getting an appt in December for programming. Please call.

## 2020-10-22 ENCOUNTER — TELEPHONE (OUTPATIENT)
Dept: NEUROLOGY | Facility: CLINIC | Age: 50
End: 2020-10-22

## 2020-10-22 NOTE — TELEPHONE ENCOUNTER
----- Message from Susanne Mendez sent at 10/22/2020  4:03 PM CDT -----  Regarding: appt  Contact: pt @ [phone]  Pt returning missed call from Dr. Garcia's office  (Elizabeth) regarding her accepting the appt offered 1/8/21@8am for her DBS programming appt. Please call.

## 2020-10-29 ENCOUNTER — OFFICE VISIT (OUTPATIENT)
Dept: OPTOMETRY | Facility: CLINIC | Age: 50
End: 2020-10-29
Payer: COMMERCIAL

## 2020-10-29 DIAGNOSIS — H52.4 MYOPIA OF BOTH EYES WITH ASTIGMATISM AND PRESBYOPIA: Primary | ICD-10-CM

## 2020-10-29 DIAGNOSIS — H52.13 MYOPIA OF BOTH EYES WITH ASTIGMATISM AND PRESBYOPIA: Primary | ICD-10-CM

## 2020-10-29 DIAGNOSIS — H02.402 PTOSIS OF LEFT EYELID: ICD-10-CM

## 2020-10-29 DIAGNOSIS — H52.203 MYOPIA OF BOTH EYES WITH ASTIGMATISM AND PRESBYOPIA: Primary | ICD-10-CM

## 2020-10-29 PROCEDURE — 92015 DETERMINE REFRACTIVE STATE: CPT | Mod: S$GLB,,, | Performed by: OPTOMETRIST

## 2020-10-29 PROCEDURE — 92004 PR EYE EXAM, NEW PATIENT,COMPREHESV: ICD-10-PCS | Mod: S$GLB,,, | Performed by: OPTOMETRIST

## 2020-10-29 PROCEDURE — 92004 COMPRE OPH EXAM NEW PT 1/>: CPT | Mod: S$GLB,,, | Performed by: OPTOMETRIST

## 2020-10-29 PROCEDURE — 99999 PR PBB SHADOW E&M-EST. PATIENT-LVL III: ICD-10-PCS | Mod: PBBFAC,,, | Performed by: OPTOMETRIST

## 2020-10-29 PROCEDURE — 92015 PR REFRACTION: ICD-10-PCS | Mod: S$GLB,,, | Performed by: OPTOMETRIST

## 2020-10-29 PROCEDURE — 99999 PR PBB SHADOW E&M-EST. PATIENT-LVL III: CPT | Mod: PBBFAC,,, | Performed by: OPTOMETRIST

## 2020-10-29 NOTE — PROGRESS NOTES
HPI     Routine eye exam--dle-over 1 year    Pt complains of blurred vision at distance and near. Feels she needs   bifocals. No eye pain. No flashes or floaters.   Left lid droop x years, but worse recently.    Last edited by Bryn Padilla, OD on 10/29/2020  4:09 PM. (History)              Assessment /Plan     For exam results, see Encounter Report.    Myopia of both eyes with astigmatism and presbyopia    Ptosis of left eyelid      1. New Spec Rx given. Different lens options discussed with patient. RTC 1 year full exam.  2. Left sided lid droop x years, worse recently, facial asymmetry, compensating with brow, Refer to oculoplastics for eval.

## 2020-10-30 ENCOUNTER — TELEPHONE (OUTPATIENT)
Dept: OPHTHALMOLOGY | Facility: CLINIC | Age: 50
End: 2020-10-30

## 2020-10-30 NOTE — TELEPHONE ENCOUNTER
----- Message from Ruslan Bridges sent at 10/30/2020  7:30 AM CDT -----  This was sent to us by mistake.     Thanks   ----- Message -----  From: Blanca Pickett  Sent: 10/29/2020   4:08 PM CDT  To: Abebe [facility] Staff    Hi this patient would like an eval for eyelid ptosis. Randy put in a referral. Thanks!

## 2020-11-06 ENCOUNTER — LAB VISIT (OUTPATIENT)
Dept: LAB | Facility: HOSPITAL | Age: 50
End: 2020-11-06
Attending: INTERNAL MEDICINE
Payer: COMMERCIAL

## 2020-11-06 DIAGNOSIS — E03.9 HYPOTHYROIDISM, UNSPECIFIED TYPE: ICD-10-CM

## 2020-11-06 LAB
T3 SERPL-MCNC: 90 NG/DL (ref 60–180)
T4 FREE SERPL-MCNC: 1.05 NG/DL (ref 0.71–1.51)
TSH SERPL DL<=0.005 MIU/L-ACNC: 0.81 UIU/ML (ref 0.4–4)

## 2020-11-06 PROCEDURE — 84443 ASSAY THYROID STIM HORMONE: CPT

## 2020-11-06 PROCEDURE — 36415 COLL VENOUS BLD VENIPUNCTURE: CPT | Mod: PO

## 2020-11-06 PROCEDURE — 84439 ASSAY OF FREE THYROXINE: CPT

## 2020-11-06 PROCEDURE — 84480 ASSAY TRIIODOTHYRONINE (T3): CPT

## 2020-11-07 ENCOUNTER — PATIENT MESSAGE (OUTPATIENT)
Dept: ENDOCRINOLOGY | Facility: CLINIC | Age: 50
End: 2020-11-07

## 2020-11-09 RX ORDER — LEVOTHYROXINE SODIUM 88 UG/1
88 TABLET ORAL
Qty: 90 TABLET | Refills: 3 | Status: SHIPPED | OUTPATIENT
Start: 2020-11-09 | End: 2021-06-21 | Stop reason: SDUPTHER

## 2020-11-09 RX ORDER — LIOTHYRONINE SODIUM 5 UG/1
5 TABLET ORAL DAILY
Qty: 90 TABLET | Refills: 3 | Status: SHIPPED | OUTPATIENT
Start: 2020-11-09 | End: 2020-11-23 | Stop reason: SDUPTHER

## 2020-11-09 RX ORDER — LEVOTHYROXINE SODIUM 88 UG/1
88 TABLET ORAL
Qty: 90 TABLET | Refills: 3 | Status: SHIPPED | OUTPATIENT
Start: 2020-11-09 | End: 2020-11-09 | Stop reason: SDUPTHER

## 2020-11-09 NOTE — TELEPHONE ENCOUNTER
Pt asking for synthroid brand to go to Pharmacy in [place]  Will print Rx for Provider to sign and then fax to [facility]

## 2020-11-20 ENCOUNTER — PATIENT MESSAGE (OUTPATIENT)
Dept: OPTOMETRY | Facility: CLINIC | Age: 50
End: 2020-11-20

## 2020-12-21 PROBLEM — E06.3 HYPOTHYROIDISM DUE TO HASHIMOTO'S THYROIDITIS: Status: ACTIVE | Noted: 2020-12-21

## 2020-12-21 PROBLEM — M79.18 RIGHT BUTTOCK PAIN: Status: ACTIVE | Noted: 2020-12-21

## 2020-12-21 PROBLEM — E03.8 HYPOTHYROIDISM DUE TO HASHIMOTO'S THYROIDITIS: Status: ACTIVE | Noted: 2020-12-21

## 2021-01-03 ENCOUNTER — PATIENT MESSAGE (OUTPATIENT)
Dept: ORTHOPEDICS | Facility: CLINIC | Age: 51
End: 2021-01-03

## 2021-01-08 ENCOUNTER — OFFICE VISIT (OUTPATIENT)
Dept: NEUROLOGY | Facility: CLINIC | Age: 51
End: 2021-01-08
Payer: COMMERCIAL

## 2021-01-08 ENCOUNTER — PATIENT MESSAGE (OUTPATIENT)
Dept: NEUROLOGY | Facility: CLINIC | Age: 51
End: 2021-01-08

## 2021-01-08 VITALS
HEIGHT: 62 IN | SYSTOLIC BLOOD PRESSURE: 124 MMHG | HEART RATE: 76 BPM | BODY MASS INDEX: 23.96 KG/M2 | DIASTOLIC BLOOD PRESSURE: 84 MMHG

## 2021-01-08 DIAGNOSIS — R25.1 TREMOR: ICD-10-CM

## 2021-01-08 PROCEDURE — 95983 PR ELEC ANALYSIS, IMPLT NEURO PULSE GEN, W/PRGRM, BRAIN, 1ST 15 MINS: ICD-10-PCS | Mod: S$GLB,,, | Performed by: PSYCHIATRY & NEUROLOGY

## 2021-01-08 PROCEDURE — 95983 ALYS BRN NPGT PRGRMG 15 MIN: CPT | Mod: S$GLB,,, | Performed by: PSYCHIATRY & NEUROLOGY

## 2021-01-08 PROCEDURE — 95984 PR ELEC ANALYSIS, IMPLT NEURO PULSE GEN, W/PRGRM, BRAIN,  EA ADDTL 15 MINS: ICD-10-PCS | Mod: S$GLB,,, | Performed by: PSYCHIATRY & NEUROLOGY

## 2021-01-08 PROCEDURE — 1126F AMNT PAIN NOTED NONE PRSNT: CPT | Mod: S$GLB,,, | Performed by: PSYCHIATRY & NEUROLOGY

## 2021-01-08 PROCEDURE — 1126F PR PAIN SEVERITY QUANTIFIED, NO PAIN PRESENT: ICD-10-PCS | Mod: S$GLB,,, | Performed by: PSYCHIATRY & NEUROLOGY

## 2021-01-08 PROCEDURE — 95984 ALYS BRN NPGT PRGRMG ADDL 15: CPT | Mod: S$GLB,,, | Performed by: PSYCHIATRY & NEUROLOGY

## 2021-01-08 PROCEDURE — 99999 PR PBB SHADOW E&M-EST. PATIENT-LVL III: ICD-10-PCS | Mod: PBBFAC,,, | Performed by: PSYCHIATRY & NEUROLOGY

## 2021-01-08 PROCEDURE — 99214 PR OFFICE/OUTPT VISIT, EST, LEVL IV, 30-39 MIN: ICD-10-PCS | Mod: 25,S$GLB,, | Performed by: PSYCHIATRY & NEUROLOGY

## 2021-01-08 PROCEDURE — 3008F PR BODY MASS INDEX (BMI) DOCUMENTED: ICD-10-PCS | Mod: CPTII,S$GLB,, | Performed by: PSYCHIATRY & NEUROLOGY

## 2021-01-08 PROCEDURE — 99999 PR PBB SHADOW E&M-EST. PATIENT-LVL III: CPT | Mod: PBBFAC,,, | Performed by: PSYCHIATRY & NEUROLOGY

## 2021-01-08 PROCEDURE — 3008F BODY MASS INDEX DOCD: CPT | Mod: CPTII,S$GLB,, | Performed by: PSYCHIATRY & NEUROLOGY

## 2021-01-08 PROCEDURE — 99214 OFFICE O/P EST MOD 30 MIN: CPT | Mod: 25,S$GLB,, | Performed by: PSYCHIATRY & NEUROLOGY

## 2021-01-08 RX ORDER — PROPRANOLOL HYDROCHLORIDE 20 MG/1
20 TABLET ORAL 3 TIMES DAILY
Qty: 90 TABLET | Refills: 11 | Status: SHIPPED | OUTPATIENT
Start: 2021-01-08 | End: 2021-04-07

## 2021-02-02 ENCOUNTER — PATIENT MESSAGE (OUTPATIENT)
Dept: NEUROLOGY | Facility: CLINIC | Age: 51
End: 2021-02-02

## 2021-02-05 ENCOUNTER — OFFICE VISIT (OUTPATIENT)
Dept: NEUROLOGY | Facility: CLINIC | Age: 51
End: 2021-02-05
Payer: COMMERCIAL

## 2021-02-05 ENCOUNTER — PATIENT MESSAGE (OUTPATIENT)
Dept: NEUROLOGY | Facility: CLINIC | Age: 51
End: 2021-02-05

## 2021-02-05 VITALS
BODY MASS INDEX: 23.96 KG/M2 | DIASTOLIC BLOOD PRESSURE: 76 MMHG | HEART RATE: 80 BPM | SYSTOLIC BLOOD PRESSURE: 124 MMHG | HEIGHT: 62 IN

## 2021-02-05 DIAGNOSIS — R25.1 TREMOR: Primary | ICD-10-CM

## 2021-02-05 DIAGNOSIS — R47.1 DYSARTHRIA: ICD-10-CM

## 2021-02-05 DIAGNOSIS — R25.1 TREMOR: ICD-10-CM

## 2021-02-05 PROCEDURE — 95983 ALYS BRN NPGT PRGRMG 15 MIN: CPT | Mod: S$GLB,,, | Performed by: PSYCHIATRY & NEUROLOGY

## 2021-02-05 PROCEDURE — 99214 OFFICE O/P EST MOD 30 MIN: CPT | Mod: 25,S$GLB,, | Performed by: PSYCHIATRY & NEUROLOGY

## 2021-02-05 PROCEDURE — 99214 PR OFFICE/OUTPT VISIT, EST, LEVL IV, 30-39 MIN: ICD-10-PCS | Mod: 25,S$GLB,, | Performed by: PSYCHIATRY & NEUROLOGY

## 2021-02-05 PROCEDURE — 3008F BODY MASS INDEX DOCD: CPT | Mod: CPTII,S$GLB,, | Performed by: PSYCHIATRY & NEUROLOGY

## 2021-02-05 PROCEDURE — 95984 PR ELEC ANALYSIS, IMPLT NEURO PULSE GEN, W/PRGRM, BRAIN,  EA ADDTL 15 MINS: ICD-10-PCS | Mod: S$GLB,,, | Performed by: PSYCHIATRY & NEUROLOGY

## 2021-02-05 PROCEDURE — 95984 ALYS BRN NPGT PRGRMG ADDL 15: CPT | Mod: S$GLB,,, | Performed by: PSYCHIATRY & NEUROLOGY

## 2021-02-05 PROCEDURE — 95983 PR ELEC ANALYSIS, IMPLT NEURO PULSE GEN, W/PRGRM, BRAIN, 1ST 15 MINS: ICD-10-PCS | Mod: S$GLB,,, | Performed by: PSYCHIATRY & NEUROLOGY

## 2021-02-05 PROCEDURE — 3008F PR BODY MASS INDEX (BMI) DOCUMENTED: ICD-10-PCS | Mod: CPTII,S$GLB,, | Performed by: PSYCHIATRY & NEUROLOGY

## 2021-02-05 PROCEDURE — 1126F PR PAIN SEVERITY QUANTIFIED, NO PAIN PRESENT: ICD-10-PCS | Mod: S$GLB,,, | Performed by: PSYCHIATRY & NEUROLOGY

## 2021-02-05 PROCEDURE — 99999 PR PBB SHADOW E&M-EST. PATIENT-LVL III: ICD-10-PCS | Mod: PBBFAC,,, | Performed by: PSYCHIATRY & NEUROLOGY

## 2021-02-05 PROCEDURE — 99999 PR PBB SHADOW E&M-EST. PATIENT-LVL III: CPT | Mod: PBBFAC,,, | Performed by: PSYCHIATRY & NEUROLOGY

## 2021-02-05 PROCEDURE — 1126F AMNT PAIN NOTED NONE PRSNT: CPT | Mod: S$GLB,,, | Performed by: PSYCHIATRY & NEUROLOGY

## 2021-02-08 RX ORDER — TOPIRAMATE 25 MG/1
25 TABLET ORAL NIGHTLY
Qty: 30 TABLET | Refills: 5 | Status: SHIPPED | OUTPATIENT
Start: 2021-02-08 | End: 2021-04-07

## 2021-03-04 ENCOUNTER — OFFICE VISIT (OUTPATIENT)
Dept: NEUROLOGY | Facility: CLINIC | Age: 51
End: 2021-03-04
Payer: COMMERCIAL

## 2021-03-04 ENCOUNTER — PATIENT MESSAGE (OUTPATIENT)
Dept: NEUROLOGY | Facility: CLINIC | Age: 51
End: 2021-03-04

## 2021-03-04 VITALS
BODY MASS INDEX: 23.96 KG/M2 | SYSTOLIC BLOOD PRESSURE: 123 MMHG | HEIGHT: 62 IN | DIASTOLIC BLOOD PRESSURE: 79 MMHG | HEART RATE: 84 BPM

## 2021-03-04 DIAGNOSIS — G20.A1 PARKINSON'S DISEASE: ICD-10-CM

## 2021-03-04 PROCEDURE — 3008F PR BODY MASS INDEX (BMI) DOCUMENTED: ICD-10-PCS | Mod: CPTII,S$GLB,, | Performed by: PSYCHIATRY & NEUROLOGY

## 2021-03-04 PROCEDURE — 3008F BODY MASS INDEX DOCD: CPT | Mod: CPTII,S$GLB,, | Performed by: PSYCHIATRY & NEUROLOGY

## 2021-03-04 PROCEDURE — 95983 PR ELEC ANALYSIS, IMPLT NEURO PULSE GEN, W/PRGRM, BRAIN, 1ST 15 MINS: ICD-10-PCS | Mod: S$GLB,,, | Performed by: PSYCHIATRY & NEUROLOGY

## 2021-03-04 PROCEDURE — 99213 OFFICE O/P EST LOW 20 MIN: CPT | Mod: 25,S$GLB,, | Performed by: PSYCHIATRY & NEUROLOGY

## 2021-03-04 PROCEDURE — 95984 ALYS BRN NPGT PRGRMG ADDL 15: CPT | Mod: S$GLB,,, | Performed by: PSYCHIATRY & NEUROLOGY

## 2021-03-04 PROCEDURE — 1126F AMNT PAIN NOTED NONE PRSNT: CPT | Mod: S$GLB,,, | Performed by: PSYCHIATRY & NEUROLOGY

## 2021-03-04 PROCEDURE — 1126F PR PAIN SEVERITY QUANTIFIED, NO PAIN PRESENT: ICD-10-PCS | Mod: S$GLB,,, | Performed by: PSYCHIATRY & NEUROLOGY

## 2021-03-04 PROCEDURE — 99999 PR PBB SHADOW E&M-EST. PATIENT-LVL III: ICD-10-PCS | Mod: PBBFAC,,, | Performed by: PSYCHIATRY & NEUROLOGY

## 2021-03-04 PROCEDURE — 99999 PR PBB SHADOW E&M-EST. PATIENT-LVL III: CPT | Mod: PBBFAC,,, | Performed by: PSYCHIATRY & NEUROLOGY

## 2021-03-04 PROCEDURE — 95984 PR ELEC ANALYSIS, IMPLT NEURO PULSE GEN, W/PRGRM, BRAIN,  EA ADDTL 15 MINS: ICD-10-PCS | Mod: S$GLB,,, | Performed by: PSYCHIATRY & NEUROLOGY

## 2021-03-04 PROCEDURE — 95983 ALYS BRN NPGT PRGRMG 15 MIN: CPT | Mod: S$GLB,,, | Performed by: PSYCHIATRY & NEUROLOGY

## 2021-03-04 PROCEDURE — 99213 PR OFFICE/OUTPT VISIT, EST, LEVL III, 20-29 MIN: ICD-10-PCS | Mod: 25,S$GLB,, | Performed by: PSYCHIATRY & NEUROLOGY

## 2021-03-04 RX ORDER — BACLOFEN 10 MG/1
10 TABLET ORAL DAILY
Qty: 30 TABLET | Refills: 11 | Status: SHIPPED | OUTPATIENT
Start: 2021-03-04 | End: 2021-04-07

## 2021-03-04 RX ORDER — PRIMIDONE 50 MG/1
100 TABLET ORAL NIGHTLY
Qty: 60 TABLET | Refills: 1 | Status: SHIPPED | OUTPATIENT
Start: 2021-03-04 | End: 2021-03-29

## 2021-03-22 ENCOUNTER — PATIENT MESSAGE (OUTPATIENT)
Dept: OPHTHALMOLOGY | Facility: CLINIC | Age: 51
End: 2021-03-22

## 2021-03-22 ENCOUNTER — TELEPHONE (OUTPATIENT)
Dept: OPHTHALMOLOGY | Facility: CLINIC | Age: 51
End: 2021-03-22

## 2021-04-26 ENCOUNTER — DOCUMENTATION ONLY (OUTPATIENT)
Dept: NEUROLOGY | Facility: CLINIC | Age: 51
End: 2021-04-26

## 2021-06-14 ENCOUNTER — PATIENT MESSAGE (OUTPATIENT)
Dept: ENDOCRINOLOGY | Facility: CLINIC | Age: 51
End: 2021-06-14

## 2021-06-21 ENCOUNTER — OFFICE VISIT (OUTPATIENT)
Dept: ENDOCRINOLOGY | Facility: CLINIC | Age: 51
End: 2021-06-21
Payer: COMMERCIAL

## 2021-06-21 DIAGNOSIS — E03.9 HYPOTHYROIDISM, UNSPECIFIED TYPE: Primary | ICD-10-CM

## 2021-06-21 PROCEDURE — 99212 PR OFFICE/OUTPT VISIT, EST, LEVL II, 10-19 MIN: ICD-10-PCS | Mod: 95,,, | Performed by: INTERNAL MEDICINE

## 2021-06-21 PROCEDURE — 99212 OFFICE O/P EST SF 10 MIN: CPT | Mod: 95,,, | Performed by: INTERNAL MEDICINE

## 2021-06-21 RX ORDER — LEVOTHYROXINE SODIUM 88 UG/1
88 TABLET ORAL
Qty: 90 TABLET | Refills: 3 | Status: SHIPPED | OUTPATIENT
Start: 2021-06-21 | End: 2022-05-06 | Stop reason: SDUPTHER

## 2021-06-21 RX ORDER — LIOTHYRONINE SODIUM 5 UG/1
5 TABLET ORAL DAILY
Qty: 90 TABLET | Refills: 3 | Status: SHIPPED | OUTPATIENT
Start: 2021-06-21 | End: 2022-07-20

## 2021-07-29 ENCOUNTER — PATIENT MESSAGE (OUTPATIENT)
Dept: ENDOCRINOLOGY | Facility: CLINIC | Age: 51
End: 2021-07-29

## 2021-07-29 DIAGNOSIS — E03.9 HYPOTHYROIDISM, UNSPECIFIED TYPE: Primary | ICD-10-CM

## 2021-07-30 ENCOUNTER — PATIENT MESSAGE (OUTPATIENT)
Dept: ENDOCRINOLOGY | Facility: CLINIC | Age: 51
End: 2021-07-30

## 2021-08-02 ENCOUNTER — LAB VISIT (OUTPATIENT)
Dept: LAB | Facility: HOSPITAL | Age: 51
End: 2021-08-02
Attending: INTERNAL MEDICINE
Payer: COMMERCIAL

## 2021-08-02 ENCOUNTER — PATIENT MESSAGE (OUTPATIENT)
Dept: ENDOCRINOLOGY | Facility: CLINIC | Age: 51
End: 2021-08-02

## 2021-08-02 DIAGNOSIS — E03.9 HYPOTHYROIDISM, UNSPECIFIED TYPE: ICD-10-CM

## 2021-08-02 PROCEDURE — 36415 COLL VENOUS BLD VENIPUNCTURE: CPT | Mod: PO | Performed by: INTERNAL MEDICINE

## 2021-08-02 PROCEDURE — 84443 ASSAY THYROID STIM HORMONE: CPT | Performed by: INTERNAL MEDICINE

## 2021-08-03 ENCOUNTER — PATIENT MESSAGE (OUTPATIENT)
Dept: ENDOCRINOLOGY | Facility: CLINIC | Age: 51
End: 2021-08-03

## 2021-08-03 LAB — TSH SERPL DL<=0.005 MIU/L-ACNC: 1.1 UIU/ML (ref 0.4–4)

## 2021-08-04 ENCOUNTER — PATIENT MESSAGE (OUTPATIENT)
Dept: ENDOCRINOLOGY | Facility: CLINIC | Age: 51
End: 2021-08-04

## 2021-08-04 DIAGNOSIS — E03.9 HYPOTHYROIDISM, UNSPECIFIED TYPE: Primary | ICD-10-CM

## 2021-08-05 ENCOUNTER — PATIENT MESSAGE (OUTPATIENT)
Dept: ENDOCRINOLOGY | Facility: CLINIC | Age: 51
End: 2021-08-05

## 2021-09-03 ENCOUNTER — PATIENT MESSAGE (OUTPATIENT)
Dept: NEUROLOGY | Facility: CLINIC | Age: 51
End: 2021-09-03

## 2021-09-17 ENCOUNTER — PATIENT MESSAGE (OUTPATIENT)
Dept: ENDOCRINOLOGY | Facility: CLINIC | Age: 51
End: 2021-09-17

## 2021-09-20 ENCOUNTER — LAB VISIT (OUTPATIENT)
Dept: LAB | Facility: HOSPITAL | Age: 51
End: 2021-09-20
Attending: INTERNAL MEDICINE
Payer: COMMERCIAL

## 2021-09-20 DIAGNOSIS — E03.9 HYPOTHYROIDISM, UNSPECIFIED TYPE: ICD-10-CM

## 2021-09-20 LAB — TSH SERPL DL<=0.005 MIU/L-ACNC: 1 UIU/ML (ref 0.4–4)

## 2021-09-20 PROCEDURE — 84443 ASSAY THYROID STIM HORMONE: CPT | Performed by: INTERNAL MEDICINE

## 2021-09-20 PROCEDURE — 36415 COLL VENOUS BLD VENIPUNCTURE: CPT | Mod: PO | Performed by: INTERNAL MEDICINE

## 2021-11-16 ENCOUNTER — IMMUNIZATION (OUTPATIENT)
Dept: FAMILY MEDICINE | Facility: CLINIC | Age: 51
End: 2021-11-16
Payer: COMMERCIAL

## 2021-11-16 DIAGNOSIS — Z23 NEED FOR VACCINATION: Primary | ICD-10-CM

## 2021-11-16 PROCEDURE — 0004A COVID-19, MRNA, LNP-S, PF, 30 MCG/0.3 ML DOSE VACCINE: CPT | Mod: PBBFAC | Performed by: FAMILY MEDICINE

## 2021-12-16 DIAGNOSIS — Z00.00 HEALTH MAINTENANCE EXAMINATION: Primary | ICD-10-CM

## 2021-12-27 ENCOUNTER — HOSPITAL ENCOUNTER (OUTPATIENT)
Dept: RADIOLOGY | Facility: HOSPITAL | Age: 51
Discharge: HOME | End: 2021-12-27
Attending: OBSTETRICS & GYNECOLOGY
Payer: COMMERCIAL

## 2021-12-27 DIAGNOSIS — Z00.00 HEALTH MAINTENANCE EXAMINATION: ICD-10-CM

## 2021-12-27 PROCEDURE — 77080 DEXA BONE DENSITY SPINE HIP: ICD-10-PCS | Mod: 26,,, | Performed by: RADIOLOGY

## 2021-12-27 PROCEDURE — 77080 DXA BONE DENSITY AXIAL: CPT | Mod: 26,,, | Performed by: RADIOLOGY

## 2021-12-27 PROCEDURE — 77080 DXA BONE DENSITY AXIAL: CPT | Mod: TC,PO

## 2022-01-14 ENCOUNTER — LAB VISIT (OUTPATIENT)
Dept: PRIMARY CARE CLINIC | Facility: OTHER | Age: 52
End: 2022-01-14
Payer: COMMERCIAL

## 2022-01-14 DIAGNOSIS — Z20.822 ENCOUNTER FOR LABORATORY TESTING FOR COVID-19 VIRUS: ICD-10-CM

## 2022-01-14 PROCEDURE — U0003 INFECTIOUS AGENT DETECTION BY NUCLEIC ACID (DNA OR RNA); SEVERE ACUTE RESPIRATORY SYNDROME CORONAVIRUS 2 (SARS-COV-2) (CORONAVIRUS DISEASE [COVID-19]), AMPLIFIED PROBE TECHNIQUE, MAKING USE OF HIGH THROUGHPUT TECHNOLOGIES AS DESCRIBED BY CMS-2020-01-R: HCPCS

## 2022-01-15 LAB
SARS-COV-2 RNA RESP QL NAA+PROBE: NOT DETECTED
SARS-COV-2- CYCLE NUMBER: NORMAL

## 2022-02-21 ENCOUNTER — PATIENT MESSAGE (OUTPATIENT)
Dept: NEUROLOGY | Facility: CLINIC | Age: 52
End: 2022-02-21
Payer: COMMERCIAL

## 2022-03-10 ENCOUNTER — PATIENT MESSAGE (OUTPATIENT)
Dept: GASTROENTEROLOGY | Facility: CLINIC | Age: 52
End: 2022-03-10

## 2022-03-10 ENCOUNTER — OFFICE VISIT (OUTPATIENT)
Dept: GASTROENTEROLOGY | Facility: CLINIC | Age: 52
End: 2022-03-10
Payer: COMMERCIAL

## 2022-03-10 VITALS — WEIGHT: 126.56 LBS | BODY MASS INDEX: 22.43 KG/M2 | HEIGHT: 63 IN

## 2022-03-10 DIAGNOSIS — R10.13 EPIGASTRIC ABDOMINAL PAIN: Primary | ICD-10-CM

## 2022-03-10 DIAGNOSIS — D50.0 IRON DEFICIENCY ANEMIA DUE TO CHRONIC BLOOD LOSS: ICD-10-CM

## 2022-03-10 DIAGNOSIS — R13.19 ESOPHAGEAL DYSPHAGIA: ICD-10-CM

## 2022-03-10 PROCEDURE — 99204 OFFICE O/P NEW MOD 45 MIN: CPT | Mod: S$GLB,,, | Performed by: INTERNAL MEDICINE

## 2022-03-10 PROCEDURE — 99999 PR PBB SHADOW E&M-EST. PATIENT-LVL III: CPT | Mod: PBBFAC,,, | Performed by: INTERNAL MEDICINE

## 2022-03-10 PROCEDURE — 3008F BODY MASS INDEX DOCD: CPT | Mod: CPTII,S$GLB,, | Performed by: INTERNAL MEDICINE

## 2022-03-10 PROCEDURE — 99204 PR OFFICE/OUTPT VISIT, NEW, LEVL IV, 45-59 MIN: ICD-10-PCS | Mod: S$GLB,,, | Performed by: INTERNAL MEDICINE

## 2022-03-10 PROCEDURE — 1159F MED LIST DOCD IN RCRD: CPT | Mod: CPTII,S$GLB,, | Performed by: INTERNAL MEDICINE

## 2022-03-10 PROCEDURE — 3008F PR BODY MASS INDEX (BMI) DOCUMENTED: ICD-10-PCS | Mod: CPTII,S$GLB,, | Performed by: INTERNAL MEDICINE

## 2022-03-10 PROCEDURE — 99999 PR PBB SHADOW E&M-EST. PATIENT-LVL III: ICD-10-PCS | Mod: PBBFAC,,, | Performed by: INTERNAL MEDICINE

## 2022-03-10 PROCEDURE — 1159F PR MEDICATION LIST DOCUMENTED IN MEDICAL RECORD: ICD-10-PCS | Mod: CPTII,S$GLB,, | Performed by: INTERNAL MEDICINE

## 2022-03-10 NOTE — PROGRESS NOTES
CC: LEXX    HPI 51 y.o. female with history of hypothyroidism, Parkinson's disease with deep brain stimulator who is here for several months of progressively worsening solid and liquid dysphagia associated with choking episodes.  She does have GERD and has been on prilosec after she had woken up with severe epigastric pain and burning sensation. Now after taking prilosec symptoms have improved but have returned.She denies food actually getting stuck or regurgitating.  She denies voices.  She is able to swallow but has difficulty.  Denies any weight loss or abdominal pain.  Has a history of iron deficiency anemia for which he takes oral iron supplementation daily last hemoglobin around 11.7 and last ferritin 12. Vit b12 317. No family history of IBD, celiac disease or colon cancer. Does report bleeding and pain from hemorrhoids which have been an issue. Otherwise no black stool or melena. No heavy menstrual cycles.     Medical records reviewed. Additional history supplemented by nursing.     Past Medical History:   Diagnosis Date    Allergy     Hypothyroidism     Sleep apnea      Past Surgical History:   Procedure Laterality Date    Deep Brain Stimulation  07/2020    KNEE SURGERY      bilateral    MOUTH SURGERY      NOSE SURGERY       Social History  Social History     Tobacco Use    Smoking status: Never Smoker    Smokeless tobacco: Never Used   Substance Use Topics    Alcohol use: Yes     Alcohol/week: 0.0 standard drinks     Comment: occasional    Drug use: No     Family History   Problem Relation Age of Onset    Parkinsonism Father     Breast cancer Maternal Aunt     Ovarian cancer Maternal Aunt     Allergies Mother     Angioedema Neg Hx     Asthma Neg Hx     Atopy Neg Hx     Eczema Neg Hx     Immunodeficiency Neg Hx     Rhinitis Neg Hx     Urticaria Neg Hx     Glaucoma Neg Hx      Review of Systems  General ROS: +fatigue, +lethargy, negative for chills, fever or weight loss  Psychological  "ROS: negative for hallucination, depression or suicidal ideation  Ophthalmic ROS: negative for blurry vision, photophobia or eye pain  ENT ROS: negative for epistaxis, sore throat or rhinorrhea  Respiratory ROS: no cough, shortness of breath, or wheezing  Cardiovascular ROS: no chest pain or dyspnea on exertion  Gastrointestinal ROS: +occasional rectal bleeding and pain, no abdominal pain, change in bowel habits, or black/ bloody stools  Genito-Urinary ROS: no dysuria, trouble voiding, or hematuria  Musculoskeletal ROS: +temor, negative for gait disturbance or muscular weakness  Neurological ROS: no syncope or seizures; no ataxia  Dermatological ROS: negative for pruritis, rash and jaundice    Physical Examination  Ht 5' 3" (1.6 m)   Wt 57.4 kg (126 lb 8.7 oz)   BMI 22.42 kg/m²   General appearance: alert, cooperative, no distress  HENT: Normocephalic, atraumatic, neck symmetrical, no nasal discharge   Eyes: conjunctivae/corneas clear, PERRL, EOM's intact  Lungs: clear to auscultation bilaterally, symmetric chest wall expansion bilaterally  Heart: regular rate and rhythm without rub; no displacement of the PMI   Abdomen: soft, non-tender; bowel sounds normoactive; no organomegaly  Extremities: extremities symmetric; no clubbing, cyanosis, or edema  Integument: Skin color, texture, turgor normal; no rashes; hair distrubution normal  Neurologic: Alert and oriented X 3, occasional tremor  Psychiatric: no pressured speech; normal affect; no evidence of impaired cognition     Labs:  Lab Results   Component Value Date    WBC 5.24 03/03/2022    HGB 11.7 (L) 03/03/2022    HCT 36.0 (L) 03/03/2022    MCV 98 03/03/2022     03/03/2022       CMP  Sodium   Date Value Ref Range Status   05/19/2021 140 136 - 145 mmol/L Final     Potassium   Date Value Ref Range Status   05/19/2021 4.6 3.5 - 5.1 mmol/L Final     Chloride   Date Value Ref Range Status   05/19/2021 107 95 - 110 mmol/L Final     CO2   Date Value Ref Range " Status   05/19/2021 26 22 - 31 mmol/L Final     Glucose   Date Value Ref Range Status   05/19/2021 94 70 - 110 mg/dL Final     Comment:     The ADA recommends the following guidelines for fasting glucose:    Normal:       less than 100 mg/dL    Prediabetes:  100 mg/dL to 125 mg/dL    Diabetes:     126 mg/dL or higher       BUN   Date Value Ref Range Status   05/19/2021 11 7 - 18 mg/dL Final     Creatinine   Date Value Ref Range Status   05/19/2021 0.86 0.50 - 1.40 mg/dL Final     Calcium   Date Value Ref Range Status   05/19/2021 9.2 8.4 - 10.2 mg/dL Final     Total Protein   Date Value Ref Range Status   05/19/2021 6.8 6.0 - 8.4 g/dL Final     Albumin   Date Value Ref Range Status   05/19/2021 4.0 3.5 - 5.2 g/dL Final     Total Bilirubin   Date Value Ref Range Status   05/19/2021 0.7 0.2 - 1.3 mg/dL Final     Alkaline Phosphatase   Date Value Ref Range Status   05/19/2021 38 38 - 145 U/L Final     AST   Date Value Ref Range Status   05/19/2021 25 14 - 36 U/L Final     ALT   Date Value Ref Range Status   05/19/2021 13 0 - 35 U/L Final     Anion Gap   Date Value Ref Range Status   05/19/2021 7 (L) 8 - 16 mmol/L Final     eGFR if    Date Value Ref Range Status   05/19/2021 >60 >60 mL/min/1.73 m^2 Final     eGFR if non    Date Value Ref Range Status   05/19/2021 >60 >60 mL/min/1.73 m^2 Final     Comment:     Calculation used to obtain the estimated glomerular filtration  rate (eGFR) is the CKD-EPI equation.        Imaging:  DEXA: osteopenia    Independently reviewed    Assessment:   1. LEXX  2. Solid and liquid esophageal dysphagia  3. Parkinson's disease with DBS    Plan:   -EGD and colonoscopy for evaluation  -EGD to evaluate dysphagia with biopsies for evaluation of EOE and empiric dilation  -Colonoscopy to evaluate for iron deficiency anemia  -Continue oral iron supplementation daily but hold 1 week prior to procedure  -Recommendation to follow endoscopy    Ras Priceuthi Veerisetty,  MD  [facility]  [address] [place] [address]  Office: [phone]  Fax: [phone]

## 2022-04-06 ENCOUNTER — HOSPITAL ENCOUNTER (OUTPATIENT)
Dept: RADIOLOGY | Facility: HOSPITAL | Age: 52
Discharge: HOME | End: 2022-04-06
Attending: ORTHOPAEDIC SURGERY
Payer: COMMERCIAL

## 2022-04-06 DIAGNOSIS — M75.41 IMPINGEMENT SYNDROME OF RIGHT SHOULDER: ICD-10-CM

## 2022-04-06 PROCEDURE — 73221 MRI JOINT UPR EXTREM W/O DYE: CPT | Mod: TC,RT

## 2022-04-06 PROCEDURE — 73221 MRI SHOULDER WITHOUT CONTRAST RIGHT: ICD-10-PCS | Mod: 26,RT,, | Performed by: RADIOLOGY

## 2022-04-06 PROCEDURE — 73221 MRI JOINT UPR EXTREM W/O DYE: CPT | Mod: 26,RT,, | Performed by: RADIOLOGY

## 2022-05-06 ENCOUNTER — TELEPHONE (OUTPATIENT)
Dept: ENDOCRINOLOGY | Facility: CLINIC | Age: 52
End: 2022-05-06
Payer: COMMERCIAL

## 2022-05-06 ENCOUNTER — PATIENT MESSAGE (OUTPATIENT)
Dept: ENDOCRINOLOGY | Facility: CLINIC | Age: 52
End: 2022-05-06
Payer: COMMERCIAL

## 2022-05-06 RX ORDER — LEVOTHYROXINE SODIUM 88 UG/1
88 TABLET ORAL
Qty: 90 TABLET | Refills: 3 | Status: SHIPPED | OUTPATIENT
Start: 2022-05-06 | End: 2023-04-28 | Stop reason: SDUPTHER

## 2022-05-07 ENCOUNTER — PATIENT MESSAGE (OUTPATIENT)
Dept: ENDOCRINOLOGY | Facility: CLINIC | Age: 52
End: 2022-05-07
Payer: COMMERCIAL

## 2022-05-16 ENCOUNTER — TELEPHONE (OUTPATIENT)
Dept: GASTROENTEROLOGY | Facility: CLINIC | Age: 52
End: 2022-05-16
Payer: COMMERCIAL

## 2022-05-16 NOTE — TELEPHONE ENCOUNTER
----- Message from Debbie King sent at 5/16/2022  8:38 AM CDT -----  Contact: [phone]  Type: Needs Medical Advice  Who Called:  Pt     Best Call Back Number:[phone]    Additional Information: Pls calling to reschedule her procedure for 6/10. Pls call back and advise

## 2022-05-26 NOTE — PROGRESS NOTES
END OF SHIFT:    Shift Summary:    Gave prn toradol x2, percocet x2    I/Os:    No intake/output data recorded. I/O last 3 completed shifts: In: 2532.3 [P.O.:1550;  I.V.:982.3]  Out: 700 [Urine:700]    Susan Chan RN Past Medical History:   Diagnosis Date    Allergy     Hypothyroidism     Sleep apnea        Past Surgical History:   Procedure Laterality Date    KNEE SURGERY      bilateral    MOUTH SURGERY      NOSE SURGERY         Current Outpatient Medications   Medication Sig    cholecalciferol, vitamin D3, 5,000 unit/mL Drop Take by mouth.    coenzyme Q10 100 mg capsule Take 100 mg by mouth.    cyanocobalamin 1,000 mcg/mL injection Inject 1 mL (1,000 mcg total) into the muscle every 28 days.    levothyroxine (SYNTHROID) 100 MCG tablet Take 1 tablet (100 mcg total) by mouth once daily.    mirtazapine (REMERON) 7.5 MG Tab Take 1 tablet (7.5 mg total) by mouth every evening.    multivitamin (THERAGRAN) per tablet Take 1 tablet by mouth.    progesterone (PROMETRIUM) 200 MG capsule      Current Facility-Administered Medications   Medication    onabotulinumtoxina injection 100 Units    onabotulinumtoxina injection 100 Units       Review of patient's allergies indicates:   Allergen Reactions    Codeine     Hydrocodone     Sulfa (sulfonamide antibiotics)        Family History   Problem Relation Age of Onset    Parkinsonism Father     Cancer Maternal Aunt         breast    Breast cancer Maternal Aunt     Ovarian cancer Maternal Aunt        Social History     Socioeconomic History    Marital status:      Spouse name: Not on file    Number of children: Not on file    Years of education: Not on file    Highest education level: Not on file   Social Needs    Financial resource strain: Not on file    Food insecurity - worry: Not on file    Food insecurity - inability: Not on file    Transportation needs - medical: Not on file    Transportation needs - non-medical: Not on file   Occupational History    Not on file   Tobacco Use    Smoking status: Never Smoker    Smokeless tobacco: Never Used   Substance and Sexual Activity    Alcohol use: Yes     Alcohol/week: 0.0 oz     Comment: occasional    Drug  use: No    Sexual activity: Not on file   Other Topics Concern    Not on file   Social History Narrative    Not on file       Chief Complaint:   Chief Complaint   Patient presents with    Hip Pain     left hip pain       History of present illness:  This is a 48-year-old female seen in consultation for Dr. Sykes.  Patient has had left hip pain now for about 6 weeks.  She 1st noticed it while doing [employer].  Gets sharp pains along the lateral hip that radiates down her thigh at times. Patient rests in a gets better.  Hurts with going up and down steps.  Pain is a 4/10.  Gets weaker.      Review of Systems:    Constitution: Negative for chills, fever, and sweats.  Negative for unexplained weight loss.    HENT:  Negative for headaches and blurry vision.    Cardiovascular:Negative for chest pain or irregular heart beat. Negative for hypertension.    Respiratory:  Negative for cough and shortness of breath.    Gastrointestinal: Negative for abdominal pain, heartburn, melena, nausea, and vomitting.    Genitourinary:  Negative bladder incontinence and dysuria.    Musculoskeletal:  See HPI    Neurological: Negative for numbness.    Psychiatric/Behavioral: Negative for depression.  The patient is not nervous/anxious.      Endocrine: Negative for polyuria    Hematologic/Lymphatic: Negative for bleeding problem.  Does not bruise/bleed easily.    Skin: Negative for poor would healing and rash      Physical Examination:    Vital Signs:    Vitals:    10/17/18 0831   BP: (!) 103/56   Pulse: 68       Body mass index is 21.08 kg/m².    This a well-developed, well nourished patient in no acute distress.  They are alert and oriented and cooperative to examination.  Pt. walks without an antalgic gait.      Examination of the patient's left hip shows full range of motion with flexion to 160°, extension to 0, external rotation to 50°, internal rotation of 15°, abduction of 50°, adduction of 15°. Skin has no rashes or bruising. Patient  has negative Stinchfield exam. Patient has negative straight leg raise.Negative internal impingement test. Negative JOHN test. Negative Daniel's test. Patient has no pain with hip range of motion.  Moderately tender to palpation over the greater trochanteric bursa. Patient is 5 out of 5 motor strength, palpable distal pulses, and intact light touch sensation.     Examination of the patient's right hip shows full range of motion with flexion to 160°, extension to 0, external rotation to 50°, internal rotation of 15°, abduction of 50°, adduction of 15°. Skin has no rashes or bruising. Patient has negative Stinchfield exam. Patient has negative straight leg raise.Negative internal impingement test. Negative JOHN test. Negative Daniel's test. Patient has no pain with hip range of motion. Nontender to palpation over the greater trochanteric bursa. Patient is 5 out of 5 motor strength, palpable distal pulses, and intact light touch sensation.         X-rays:  X-rays left hip are ordered and reviewed which show well-maintained joint space of both hips.     Assessment::  Left trochanteric bursitis with IT band tendonitis    Plan:  I reviewed the x-ray with her today.  We talked about treatment options for IT band and trochanteric bursa.  Offered her injection but she declined.  I gave her IT band stretching exercise guide.    This note was created using [employer] voice recognition software that occasionally misinterpreted phrases or words.    Consult note is delivered via Epic messaging service.

## 2022-07-20 RX ORDER — LIOTHYRONINE SODIUM 5 UG/1
5 TABLET ORAL DAILY
Qty: 90 TABLET | Refills: 0 | Status: SHIPPED | OUTPATIENT
Start: 2022-07-20 | End: 2022-09-29

## 2022-08-02 ENCOUNTER — PATIENT MESSAGE (OUTPATIENT)
Dept: ENDOSCOPY | Facility: HOSPITAL | Age: 52
End: 2022-08-02
Payer: COMMERCIAL

## 2022-08-08 ENCOUNTER — PATIENT MESSAGE (OUTPATIENT)
Dept: ENDOSCOPY | Facility: HOSPITAL | Age: 52
End: 2022-08-08
Payer: COMMERCIAL

## 2022-08-09 ENCOUNTER — PATIENT MESSAGE (OUTPATIENT)
Dept: ENDOSCOPY | Facility: HOSPITAL | Age: 52
End: 2022-08-09
Payer: COMMERCIAL

## 2022-08-16 ENCOUNTER — PATIENT MESSAGE (OUTPATIENT)
Dept: ENDOSCOPY | Facility: HOSPITAL | Age: 52
End: 2022-08-16
Payer: COMMERCIAL

## 2022-08-17 ENCOUNTER — PATIENT MESSAGE (OUTPATIENT)
Dept: ENDOSCOPY | Facility: HOSPITAL | Age: 52
End: 2022-08-17
Payer: COMMERCIAL

## 2022-08-19 ENCOUNTER — ANESTHESIA (OUTPATIENT)
Dept: ENDOSCOPY | Facility: HOSPITAL | Age: 52
End: 2022-08-19
Payer: COMMERCIAL

## 2022-08-19 ENCOUNTER — ANESTHESIA EVENT (OUTPATIENT)
Dept: ENDOSCOPY | Facility: HOSPITAL | Age: 52
End: 2022-08-19
Payer: COMMERCIAL

## 2022-08-19 ENCOUNTER — HOSPITAL ENCOUNTER (OUTPATIENT)
Facility: HOSPITAL | Age: 52
Discharge: HOME | End: 2022-08-19
Attending: INTERNAL MEDICINE | Admitting: INTERNAL MEDICINE
Payer: COMMERCIAL

## 2022-08-19 DIAGNOSIS — D50.0 IRON DEFICIENCY ANEMIA DUE TO CHRONIC BLOOD LOSS: Primary | ICD-10-CM

## 2022-08-19 DIAGNOSIS — D50.9 IRON DEFICIENCY ANEMIA: ICD-10-CM

## 2022-08-19 LAB
B-HCG UR QL: NEGATIVE
CTP QC/QA: YES

## 2022-08-19 PROCEDURE — 43239 EGD BIOPSY SINGLE/MULTIPLE: CPT | Mod: 59,,, | Performed by: INTERNAL MEDICINE

## 2022-08-19 PROCEDURE — 43239 EGD BIOPSY SINGLE/MULTIPLE: CPT | Mod: 59,PO | Performed by: INTERNAL MEDICINE

## 2022-08-19 PROCEDURE — 45378 PR COLONOSCOPY,DIAGNOSTIC: ICD-10-PCS | Mod: ,,, | Performed by: INTERNAL MEDICINE

## 2022-08-19 PROCEDURE — D9220A PRA ANESTHESIA: Mod: CRNA,,, | Performed by: NURSE ANESTHETIST, CERTIFIED REGISTERED

## 2022-08-19 PROCEDURE — 63600175 PHARM REV CODE 636 W HCPCS: Mod: PO | Performed by: INTERNAL MEDICINE

## 2022-08-19 PROCEDURE — 81025 URINE PREGNANCY TEST: CPT | Mod: PO | Performed by: INTERNAL MEDICINE

## 2022-08-19 PROCEDURE — 63600175 PHARM REV CODE 636 W HCPCS: Mod: PO | Performed by: NURSE ANESTHETIST, CERTIFIED REGISTERED

## 2022-08-19 PROCEDURE — 43248 EGD GUIDE WIRE INSERTION: CPT | Mod: ,,, | Performed by: INTERNAL MEDICINE

## 2022-08-19 PROCEDURE — 43248 PR EGD, FLEX, W/DILATION OVER GUIDEWIRE: ICD-10-PCS | Mod: ,,, | Performed by: INTERNAL MEDICINE

## 2022-08-19 PROCEDURE — 25000003 PHARM REV CODE 250: Mod: PO | Performed by: NURSE ANESTHETIST, CERTIFIED REGISTERED

## 2022-08-19 PROCEDURE — 88305 TISSUE EXAM BY PATHOLOGIST: CPT | Mod: 26,,, | Performed by: PATHOLOGY

## 2022-08-19 PROCEDURE — 43239 PR EGD, FLEX, W/BIOPSY, SGL/MULTI: ICD-10-PCS | Mod: 59,,, | Performed by: INTERNAL MEDICINE

## 2022-08-19 PROCEDURE — 37000009 HC ANESTHESIA EA ADD 15 MINS: Mod: PO | Performed by: INTERNAL MEDICINE

## 2022-08-19 PROCEDURE — 88305 TISSUE EXAM BY PATHOLOGIST: CPT | Mod: 59 | Performed by: PATHOLOGY

## 2022-08-19 PROCEDURE — D9220A PRA ANESTHESIA: Mod: ANES,,, | Performed by: ANESTHESIOLOGY

## 2022-08-19 PROCEDURE — 27201012 HC FORCEPS, HOT/COLD, DISP: Mod: PO | Performed by: INTERNAL MEDICINE

## 2022-08-19 PROCEDURE — 88305 TISSUE EXAM BY PATHOLOGIST: ICD-10-PCS | Mod: 26,,, | Performed by: PATHOLOGY

## 2022-08-19 PROCEDURE — 43248 EGD GUIDE WIRE INSERTION: CPT | Mod: PO | Performed by: INTERNAL MEDICINE

## 2022-08-19 PROCEDURE — 37000008 HC ANESTHESIA 1ST 15 MINUTES: Mod: PO | Performed by: INTERNAL MEDICINE

## 2022-08-19 PROCEDURE — D9220A PRA ANESTHESIA: ICD-10-PCS | Mod: ANES,,, | Performed by: ANESTHESIOLOGY

## 2022-08-19 PROCEDURE — D9220A PRA ANESTHESIA: ICD-10-PCS | Mod: CRNA,,, | Performed by: NURSE ANESTHETIST, CERTIFIED REGISTERED

## 2022-08-19 PROCEDURE — 45378 DIAGNOSTIC COLONOSCOPY: CPT | Mod: PO | Performed by: INTERNAL MEDICINE

## 2022-08-19 PROCEDURE — 45378 DIAGNOSTIC COLONOSCOPY: CPT | Mod: ,,, | Performed by: INTERNAL MEDICINE

## 2022-08-19 RX ORDER — SODIUM CHLORIDE 0.9 % (FLUSH) 0.9 %
10 SYRINGE (ML) INJECTION EVERY 6 HOURS PRN
Status: DISCONTINUED | OUTPATIENT
Start: 2022-08-19 | End: 2022-08-19 | Stop reason: HOSPADM

## 2022-08-19 RX ORDER — PROPOFOL 10 MG/ML
VIAL (ML) INTRAVENOUS CONTINUOUS PRN
Status: DISCONTINUED | OUTPATIENT
Start: 2022-08-19 | End: 2022-08-19

## 2022-08-19 RX ORDER — LIDOCAINE HCL/PF 100 MG/5ML
SYRINGE (ML) INTRAVENOUS
Status: DISCONTINUED | OUTPATIENT
Start: 2022-08-19 | End: 2022-08-19

## 2022-08-19 RX ORDER — PROPOFOL 10 MG/ML
VIAL (ML) INTRAVENOUS
Status: DISCONTINUED | OUTPATIENT
Start: 2022-08-19 | End: 2022-08-19

## 2022-08-19 RX ORDER — SODIUM CHLORIDE, SODIUM LACTATE, POTASSIUM CHLORIDE, CALCIUM CHLORIDE 600; 310; 30; 20 MG/100ML; MG/100ML; MG/100ML; MG/100ML
INJECTION, SOLUTION INTRAVENOUS CONTINUOUS
Status: DISCONTINUED | OUTPATIENT
Start: 2022-08-19 | End: 2022-08-19 | Stop reason: HOSPADM

## 2022-08-19 RX ADMIN — LIDOCAINE HYDROCHLORIDE 100 MG: 20 INJECTION, SOLUTION INTRAVENOUS at 08:08

## 2022-08-19 RX ADMIN — SODIUM CHLORIDE, SODIUM LACTATE, POTASSIUM CHLORIDE, AND CALCIUM CHLORIDE: .6; .31; .03; .02 INJECTION, SOLUTION INTRAVENOUS at 07:08

## 2022-08-19 RX ADMIN — PROPOFOL 50 MG: 10 INJECTION, EMULSION INTRAVENOUS at 08:08

## 2022-08-19 RX ADMIN — PROPOFOL 150 MCG/KG/MIN: 10 INJECTION, EMULSION INTRAVENOUS at 08:08

## 2022-08-19 NOTE — ANESTHESIA PREPROCEDURE EVALUATION
08/19/2022  aNthalie Rodriguez is a 51 y.o., female.      Pre-op Assessment    I have reviewed the Patient Summary Reports.     I have reviewed the Nursing Notes. I have reviewed the NPO Status.   I have reviewed the Medications.     Review of Systems  Anesthesia Hx:  No problems with previous Anesthesia    Social:  Non-Smoker    Pulmonary:   Sleep Apnea    Hepatic/GI:   Bowel Prep.    Neurological:   Neuromuscular Disease, Parkinson's disease    Endocrine:   Hypothyroidism    Psych:   Psychiatric History          Physical Exam  General: Well nourished, Cooperative, Alert and Oriented    Airway:  Mallampati: II       Dental:  Intact        Anesthesia Plan  Type of Anesthesia, risks & benefits discussed:    Anesthesia Type: Gen Natural Airway  Intra-op Monitoring Plan: Standard ASA Monitors  Induction:  IV  Informed Consent: Informed consent signed with the Patient and all parties understand the risks and agree with anesthesia plan.  All questions answered.   ASA Score: 2  Day of Surgery Review of History & Physical: H&P Update referred to the surgeon/provider.    Ready For Surgery From Anesthesia Perspective.     .

## 2022-08-19 NOTE — ANESTHESIA POSTPROCEDURE EVALUATION
Anesthesia Post Evaluation    Patient: Nathalie Rodriguez    Procedure(s) Performed: Procedure(s) (LRB):  EGD (ESOPHAGOGASTRODUODENOSCOPY) (N/A)  COLONOSCOPY (N/A)    Final Anesthesia Type: general      Patient location during evaluation: PACU  Patient participation: Yes- Able to Participate  Level of consciousness: awake and alert  Post-procedure vital signs: reviewed and stable  Pain management: adequate  Airway patency: patent    PONV status at discharge: No PONV  Anesthetic complications: no      Cardiovascular status: hemodynamically stable  Respiratory status: unassisted and room air  Hydration status: euvolemic  Follow-up not needed.          Vitals Value Taken Time   /62 08/19/22 0925   Temp 36.8 °C (98.2 °F) 08/19/22 0915   Pulse 88 08/19/22 0925   Resp 17 08/19/22 0925   SpO2 99 % 08/19/22 0925         Event Time   Out of Recovery 09:55:00         Pain/Vielka Score: Vielka Score: 10 (8/19/2022  9:20 AM)

## 2022-08-19 NOTE — PROVATION PATIENT INSTRUCTIONS
Discharge Summary/Instructions after an Endoscopic Procedure  Patient Name: Nathalie Rodriguez  Patient MRN: [id number]  Patient YOB: 1970 Friday, August 19, 2022  Ras Buckner MD  Dear patient,  As a result of recent federal legislation (The Federal Cures Act), you may   receive lab or pathology results from your procedure in your [facility]   account before your physician is able to contact you. Your physician or   their representative will relay the results to you with their   recommendations at their soonest availability.  Thank you,  RESTRICTIONS:  During your procedure today, you received medications for sedation.  These   medications may affect your judgment, balance and coordination.  Therefore,   for 24 hours, you have the following restrictions:   - DO NOT drive a car, operate machinery, make legal/financial decisions,   sign important papers or drink alcohol.    ACTIVITY:  Today: no heavy lifting, straining or running due to procedural   sedation/anesthesia.  The following day: return to full activity including work.  DIET:  Eat and drink normally unless instructed otherwise.     TREATMENT FOR COMMON SIDE EFFECTS:  - Mild abdominal pain, nausea, belching, bloating or excessive gas:  rest,   eat lightly and use a heating pad.  - Sore Throat: treat with throat lozenges and/or gargle with warm salt   water.  - Because air was used during the procedure, expelling large amounts of air   from your rectum or belching is normal.  - If a bowel prep was taken, you may not have a bowel movement for 1-3 days.    This is normal.  SYMPTOMS TO WATCH FOR AND REPORT TO YOUR PHYSICIAN:  1. Abdominal pain or bloating, other than gas cramps.  2. Chest pain.  3. Back pain.  4. Signs of infection such as: chills or fever occurring within 24 hours   after the procedure.  5. Rectal bleeding, which would show as bright red, maroon, or black stools.   (A tablespoon of blood from the rectum is not serious, especially  if   hemorrhoids are present.)  6. Vomiting.  7. Weakness or dizziness.  GO DIRECTLY TO THE NEAREST EMERGENCY ROOM IF YOU HAVE ANY OF THE FOLLOWING:      Difficulty breathing              Chills and/or fever over 101 F   Persistent vomiting and/or vomiting blood   Severe abdominal pain   Severe chest pain   Black, tarry stools   Bleeding- more than one tablespoon   Any other symptom or condition that you feel may need urgent attention  Your doctor recommends these additional instructions:  If any biopsies were taken, your doctors clinic will contact you in 1 to 2   weeks with any results.  You are being discharged to home.   Advance your diet as tolerated [Duration].   Continue your present medications.   We are waiting for your pathology results.  For questions, problems or results please call your physician - Ras Buckner MD at Work:  [phone].  EMERGENCY PHONE NUMBER: [phone], LAB RESULTS: [phone]  IF A COMPLICATION OR EMERGENCY SITUATION ARISES AND YOU ARE UNABLE TO REACH   YOUR PHYSICIAN - GO DIRECTLY TO THE EMERGENCY ROOM.  ___________________________________________  Nurse Signature  ___________________________________________  Patient/Designated Responsible Party Signature  Ras Buckner MD  8/19/2022 8:53:30 AM  This report has been verified and signed electronically.  Dear patient,  As a result of recent federal legislation (The Federal Cures Act), you may   receive lab or pathology results from your procedure in your [facility]   account before your physician is able to contact you. Your physician or   their representative will relay the results to you with their   recommendations at their soonest availability.  Thank you.  PROVATION

## 2022-08-19 NOTE — PROVATION PATIENT INSTRUCTIONS
Discharge Summary/Instructions after an Endoscopic Procedure  Patient Name: Nathalie Rodriguez  Patient MRN: [id number]  Patient YOB: 1970 Friday, August 19, 2022  Ras Buckner MD  Dear patient,  As a result of recent federal legislation (The Federal Cures Act), you may   receive lab or pathology results from your procedure in your [facility]   account before your physician is able to contact you. Your physician or   their representative will relay the results to you with their   recommendations at their soonest availability.  Thank you,  RESTRICTIONS:  During your procedure today, you received medications for sedation.  These   medications may affect your judgment, balance and coordination.  Therefore,   for 24 hours, you have the following restrictions:   - DO NOT drive a car, operate machinery, make legal/financial decisions,   sign important papers or drink alcohol.    ACTIVITY:  Today: no heavy lifting, straining or running due to procedural   sedation/anesthesia.  The following day: return to full activity including work.  DIET:  Eat and drink normally unless instructed otherwise.     TREATMENT FOR COMMON SIDE EFFECTS:  - Mild abdominal pain, nausea, belching, bloating or excessive gas:  rest,   eat lightly and use a heating pad.  - Sore Throat: treat with throat lozenges and/or gargle with warm salt   water.  - Because air was used during the procedure, expelling large amounts of air   from your rectum or belching is normal.  - If a bowel prep was taken, you may not have a bowel movement for 1-3 days.    This is normal.  SYMPTOMS TO WATCH FOR AND REPORT TO YOUR PHYSICIAN:  1. Abdominal pain or bloating, other than gas cramps.  2. Chest pain.  3. Back pain.  4. Signs of infection such as: chills or fever occurring within 24 hours   after the procedure.  5. Rectal bleeding, which would show as bright red, maroon, or black stools.   (A tablespoon of blood from the rectum is not serious, especially  if   hemorrhoids are present.)  6. Vomiting.  7. Weakness or dizziness.  GO DIRECTLY TO THE NEAREST EMERGENCY ROOM IF YOU HAVE ANY OF THE FOLLOWING:      Difficulty breathing              Chills and/or fever over 101 F   Persistent vomiting and/or vomiting blood   Severe abdominal pain   Severe chest pain   Black, tarry stools   Bleeding- more than one tablespoon   Any other symptom or condition that you feel may need urgent attention  Your doctor recommends these additional instructions:  If any biopsies were taken, your doctors clinic will contact you in 1 to 2   weeks with any results.  Your physician has recommended a repeat colonoscopy in 10 years for   screening purposes.   You are being discharged to home.   Advance your diet as tolerated.   Continue your present medications.  For questions, problems or results please call your physician - Ras Buckner MD at Work:  [phone].  EMERGENCY PHONE NUMBER: [phone], LAB RESULTS: [phone]  IF A COMPLICATION OR EMERGENCY SITUATION ARISES AND YOU ARE UNABLE TO REACH   YOUR PHYSICIAN - GO DIRECTLY TO THE EMERGENCY ROOM.  ___________________________________________  Nurse Signature  ___________________________________________  Patient/Designated Responsible Party Signature  Ras Buckner MD  8/19/2022 9:14:10 AM  This report has been verified and signed electronically.  Dear patient,  As a result of recent federal legislation (The Federal Cures Act), you may   receive lab or pathology results from your procedure in your [facility]   account before your physician is able to contact you. Your physician or   their representative will relay the results to you with their   recommendations at their soonest availability.  Thank you.  PROVATION

## 2022-08-19 NOTE — TRANSFER OF CARE
"Anesthesia Transfer of Care Note    Patient: Nathalie Rodriguez    Procedure(s) Performed: Procedure(s) (LRB):  EGD (ESOPHAGOGASTRODUODENOSCOPY) (N/A)  COLONOSCOPY (N/A)    Patient location: PACU    Anesthesia Type: general    Transport from OR: Transported from OR on 2-3 L/min O2 by NC with adequate spontaneous ventilation    Post pain: adequate analgesia    Post assessment: no apparent anesthetic complications and tolerated procedure well    Post vital signs: stable    Level of consciousness: sedated    Nausea/Vomiting: no nausea/vomiting    Complications: none    Transfer of care protocol was followed      Last vitals:   Visit Vitals  /68   Pulse 92   Temp 36.7 °C (98.1 °F)   Resp 17   Ht 5' 3" (1.6 m)   Wt 58.1 kg (128 lb)   SpO2 97%   Breastfeeding No   BMI 22.67 kg/m²     "

## 2022-08-22 VITALS
HEART RATE: 88 BPM | TEMPERATURE: 98 F | BODY MASS INDEX: 22.68 KG/M2 | RESPIRATION RATE: 17 BRPM | SYSTOLIC BLOOD PRESSURE: 120 MMHG | DIASTOLIC BLOOD PRESSURE: 62 MMHG | WEIGHT: 128 LBS | HEIGHT: 63 IN | OXYGEN SATURATION: 99 %

## 2022-08-25 LAB
FINAL PATHOLOGIC DIAGNOSIS: NORMAL
GROSS: NORMAL
Lab: NORMAL

## 2022-09-05 ENCOUNTER — PATIENT MESSAGE (OUTPATIENT)
Dept: GASTROENTEROLOGY | Facility: CLINIC | Age: 52
End: 2022-09-05
Payer: COMMERCIAL

## 2022-09-05 DIAGNOSIS — R13.19 ESOPHAGEAL DYSPHAGIA: Primary | ICD-10-CM

## 2022-09-06 DIAGNOSIS — R13.19 ESOPHAGEAL DYSPHAGIA: Primary | ICD-10-CM

## 2022-09-08 ENCOUNTER — PATIENT MESSAGE (OUTPATIENT)
Dept: GASTROENTEROLOGY | Facility: CLINIC | Age: 52
End: 2022-09-08
Payer: COMMERCIAL

## 2022-09-08 ENCOUNTER — TELEPHONE (OUTPATIENT)
Dept: ENDOSCOPY | Facility: HOSPITAL | Age: 52
End: 2022-09-08
Payer: COMMERCIAL

## 2022-09-08 NOTE — TELEPHONE ENCOUNTER
Called pt to schedule her procedure. States she will call back at end of month to schedule for October. Phone number [phone] given.

## 2022-09-13 ENCOUNTER — LAB VISIT (OUTPATIENT)
Dept: LAB | Facility: HOSPITAL | Age: 52
End: 2022-09-13
Payer: COMMERCIAL

## 2022-09-13 DIAGNOSIS — Z00.00 WELLNESS EXAMINATION: ICD-10-CM

## 2022-09-13 LAB
ALBUMIN SERPL BCP-MCNC: 3.9 G/DL (ref 3.5–5.2)
ALP SERPL-CCNC: 36 U/L (ref 55–135)
ALT SERPL W/O P-5'-P-CCNC: 19 U/L (ref 10–44)
ANION GAP SERPL CALC-SCNC: 5 MMOL/L (ref 8–16)
AST SERPL-CCNC: 21 U/L (ref 10–40)
BASOPHILS # BLD AUTO: 0.02 K/UL (ref 0–0.2)
BASOPHILS NFR BLD: 0.3 % (ref 0–1.9)
BILIRUB SERPL-MCNC: 1 MG/DL (ref 0.1–1)
BUN SERPL-MCNC: 10 MG/DL (ref 6–20)
CALCIUM SERPL-MCNC: 9.5 MG/DL (ref 8.7–10.5)
CHLORIDE SERPL-SCNC: 103 MMOL/L (ref 95–110)
CHOLEST SERPL-MCNC: 211 MG/DL (ref 120–199)
CHOLEST/HDLC SERPL: 2.5 {RATIO} (ref 2–5)
CO2 SERPL-SCNC: 28 MMOL/L (ref 23–29)
CREAT SERPL-MCNC: 0.9 MG/DL (ref 0.5–1.4)
DIFFERENTIAL METHOD: ABNORMAL
EOSINOPHIL # BLD AUTO: 0.1 K/UL (ref 0–0.5)
EOSINOPHIL NFR BLD: 1.5 % (ref 0–8)
ERYTHROCYTE [DISTWIDTH] IN BLOOD BY AUTOMATED COUNT: 13.1 % (ref 11.5–14.5)
EST. GFR  (NO RACE VARIABLE): >60 ML/MIN/1.73 M^2
ESTIMATED AVG GLUCOSE: 103 MG/DL (ref 68–131)
GLUCOSE SERPL-MCNC: 93 MG/DL (ref 70–110)
HBA1C MFR BLD: 5.2 % (ref 4–5.6)
HCT VFR BLD AUTO: 37.5 % (ref 37–48.5)
HDLC SERPL-MCNC: 85 MG/DL (ref 40–75)
HDLC SERPL: 40.3 % (ref 20–50)
HGB BLD-MCNC: 12.5 G/DL (ref 12–16)
IMM GRANULOCYTES # BLD AUTO: 0.02 K/UL (ref 0–0.04)
IMM GRANULOCYTES NFR BLD AUTO: 0.3 % (ref 0–0.5)
LDLC SERPL CALC-MCNC: 110.8 MG/DL (ref 63–159)
LYMPHOCYTES # BLD AUTO: 1.7 K/UL (ref 1–4.8)
LYMPHOCYTES NFR BLD: 22.3 % (ref 18–48)
MCH RBC QN AUTO: 32.7 PG (ref 27–31)
MCHC RBC AUTO-ENTMCNC: 33.3 G/DL (ref 32–36)
MCV RBC AUTO: 98 FL (ref 82–98)
MONOCYTES # BLD AUTO: 0.4 K/UL (ref 0.3–1)
MONOCYTES NFR BLD: 5.3 % (ref 4–15)
NEUTROPHILS # BLD AUTO: 5.3 K/UL (ref 1.8–7.7)
NEUTROPHILS NFR BLD: 70.3 % (ref 38–73)
NONHDLC SERPL-MCNC: 126 MG/DL
NRBC BLD-RTO: 0 /100 WBC
PLATELET # BLD AUTO: 234 K/UL (ref 150–450)
PMV BLD AUTO: 9.1 FL (ref 9.2–12.9)
POTASSIUM SERPL-SCNC: 3.9 MMOL/L (ref 3.5–5.1)
PROT SERPL-MCNC: 7 G/DL (ref 6–8.4)
RBC # BLD AUTO: 3.82 M/UL (ref 4–5.4)
SODIUM SERPL-SCNC: 136 MMOL/L (ref 136–145)
TRIGL SERPL-MCNC: 76 MG/DL (ref 30–150)
WBC # BLD AUTO: 7.49 K/UL (ref 3.9–12.7)

## 2022-09-13 PROCEDURE — 36415 COLL VENOUS BLD VENIPUNCTURE: CPT | Mod: PO | Performed by: INTERNAL MEDICINE

## 2022-09-13 PROCEDURE — 80053 COMPREHEN METABOLIC PANEL: CPT | Performed by: INTERNAL MEDICINE

## 2022-09-13 PROCEDURE — 83036 HEMOGLOBIN GLYCOSYLATED A1C: CPT | Performed by: INTERNAL MEDICINE

## 2022-09-13 PROCEDURE — 80061 LIPID PANEL: CPT | Performed by: INTERNAL MEDICINE

## 2022-09-13 PROCEDURE — 85025 COMPLETE CBC W/AUTO DIFF WBC: CPT | Performed by: INTERNAL MEDICINE

## 2022-09-21 ENCOUNTER — PATIENT MESSAGE (OUTPATIENT)
Dept: OPTOMETRY | Facility: CLINIC | Age: 52
End: 2022-09-21
Payer: COMMERCIAL

## 2022-11-04 NOTE — H&P
History & Physical - Short Stay  Gastroenterology    SUBJECTIVE:     Procedure: Colonoscopy and EGD    Chief Complaint/Indication for Procedure: Iron Deficiency Anemia    PTA Medications   Medication Sig    cholecalciferol, vitamin D3, 5,000 unit/mL Drop Take 5,000 Units by mouth every 7 days.    cyclobenzaprine (FLEXERIL) 5 MG tablet Take 2 tablets (10 mg total) by mouth nightly as needed for Muscle spasms.    levothyroxine (SYNTHROID) 88 MCG tablet Take 1 tablet (88 mcg total) by mouth before breakfast. BRAND SYNTHROID    liothyronine (CYTOMEL) 5 MCG Tab TAKE 1 TABLET (5 MCG TOTAL) BY MOUTH ONCE DAILY.    multivitamin (THERAGRAN) per tablet Take 1 tablet by mouth.     Review of patient's allergies indicates:   Allergen Reactions    Codeine     Gluten protein     Hydrocodone     Sulfa (sulfonamide antibiotics)     Hydrocodone-acetaminophen Nausea Only      Past Medical History:   Diagnosis Date    Allergy     Hypothyroidism     Sleep apnea      Past Surgical History:   Procedure Laterality Date    Deep Brain Stimulation  07/2020    KNEE SURGERY      bilateral    MOUTH SURGERY      NOSE SURGERY       Family History   Problem Relation Age of Onset    Parkinsonism Father     Breast cancer Maternal Aunt     Ovarian cancer Maternal Aunt     Allergies Mother     Angioedema Neg Hx     Asthma Neg Hx     Atopy Neg Hx     Eczema Neg Hx     Immunodeficiency Neg Hx     Rhinitis Neg Hx     Urticaria Neg Hx     Glaucoma Neg Hx      Social History     Tobacco Use    Smoking status: Never Smoker    Smokeless tobacco: Never Used   Substance Use Topics    Alcohol use: Yes     Alcohol/week: 0.0 standard drinks     Comment: occasional    Drug use: No     OBJECTIVE:     Vital Signs (Most Recent)  Temp: 98.1 °F (36.7 °C) (08/19/22 0750)  Pulse: 92 (08/19/22 0750)  Resp: 17 (08/19/22 0750)  BP: 122/68 (08/19/22 0750)  SpO2: 97 % (08/19/22 0750)    Physical Exam:                                                        GENERAL:  Comfortable, in no acute distress.                                 HEENT EXAM:  Nonicteric.  No adenopathy.  Oropharynx is clear.               NECK:  Supple.                                                               LUNGS:  Clear.                                                               CARDIAC:  Regular rate and rhythm.  S1, S2.  No murmur.                      ABDOMEN:  Soft, positive bowel sounds, nontender.  No hepatosplenomegaly or masses.  No rebound or guarding.                                             EXTREMITIES:  No edema.     MENTAL STATUS:  Normal, alert and oriented.    ASSESSMENT/PLAN:     Assessment: Iron Deficiency Anemia    Plan: Colonoscopy and EGD     all other ROS negative except as per HPI

## 2022-11-21 ENCOUNTER — TELEPHONE (OUTPATIENT)
Dept: PHYSICAL MEDICINE AND REHAB | Facility: CLINIC | Age: 52
End: 2022-11-21
Payer: COMMERCIAL

## 2022-11-21 NOTE — TELEPHONE ENCOUNTER
Spoke with patient and she states she will contact clinic when she is ready to address her Parkinson's Disease. Sent [employer] message as well.

## 2022-12-01 NOTE — TELEPHONE ENCOUNTER
12/01/2022  Patient Information  Ranjana Hatfield                                                                                          [address] [facility] [place] [address]   9/25/1933  'PCP/Referring Physician'  Alpesh Perez,   [phone]  No ref. provider found    Chief Complaint   Patient presents with   • Consult     Referred by Dr. Hernandes for left sided neck mass. Patient has history of right breast cancer       History of Present Illness:   The patient is an 89-year-old  female who has a left neck mass. She has been referred for an excision and biopsy. She apparently had a needle biopsy, which was non-diagnostic at [facility]. 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 Hernandes feels we need more tissue for appropriate treatment and a possible excisional biopsy. The patient states that this has happened in the last few weeks or months. The patient denies any significant weight loss. She denies any pulmonary symptoms such as cough, hemoptysis.       Patient Active Problem List   Diagnosis   • Chronic renal insufficiency   • Anemia of chronic disease   • Hypothyroidism   • Essential hypertension   • Persistent atrial fibrillation (HCC)   • VTE (venous thromboembolism)   • Mediastinal lymphadenopathy   • Pneumomediastinum (HCC)   • Breast cancer (HCC)   • Pneumoperitoneum   • Neck mass     Past Medical History:   Diagnosis Date   • Anemia    • Arrhythmia    • Arthritis    • Atrial fibrillation (HCC)     sees cardiologist regularly    • Breast cancer (HCC)    • Cancer (HCC) 09/2018    right breast lumpectomy    • Chronic kidney disease    • Disease of thyroid gland     po meds    • Elevated cholesterol    • GI bleed 2011   • History of blood transfusion    • Hyperlipidemia    • Hypertension    • Internal hemorrhoids    • Mediastinal lymphadenopathy    • Pulmonary emboli (HCC) 2010    Warfarin therapy.IVC filter, data deficit. Warfarin discontinued.   • Use  Orders sent.   TSH, Ft4, T3 in 6 weeks   of cane as ambulatory aid    • UTI (urinary tract infection)      Past Surgical History:   Procedure Laterality Date   • BACK SURGERY  2010    lower back  data-deficit   • BREAST SURGERY Right setp 2018    right breast lumpectomy    • BRONCHOSCOPY N/A 9/26/2018    Procedure: BRONCHOSCOPY WITH ENDOBRONCHIAL ULTRASOUND;  Surgeon: Salbador Dorman MD;  Location:  [facility];  Service: Cardiothoracic   • BRONCHOSCOPY N/A 9/28/2018    Procedure: BRONCHOSCOPY;  Surgeon: Salbador Dorman MD;  Location:  [facility];  Service: Cardiothoracic   • CATARACT EXTRACTION W/ INTRAOCULAR LENS IMPLANT  12/2015    RIGHT SIDE 12/2015 - LEFT SIDE 6/2018   • COLONOSCOPY  2011   • TONSILLECTOMY      AGE 4-5   • VARICOSE VEIN SURGERY  2008       Current Outpatient Medications:   •  amLODIPine (NORVASC) 2.5 MG tablet, Take 2.5 mg by mouth Every Morning., Disp: , Rfl:   •  anastrozole (ARIMIDEX) 1 MG tablet, , Disp: , Rfl:   •  cholecalciferol (VITAMIN D3) 1000 UNITS tablet, Take 2,000 Units by mouth Daily., Disp: , Rfl:   •  Eliquis 2.5 MG tablet tablet, , Disp: , Rfl:   •  ELIQUIS 5 MG tablet tablet, TAKE ONE TABLET BY MOUTH EVERY 12 HOURS, Disp: 60 tablet, Rfl: 3  •  Ferrous Sulfate (SLOW FE PO), Take 1 tablet by mouth daily., Disp: , Rfl:   •  furosemide (LASIX) 20 MG tablet, Take 20 mg by mouth Every Other Day., Disp: , Rfl:   •  gabapentin (NEURONTIN) 100 MG capsule, Take 100 mg by mouth 2 (Two) Times a Day., Disp: , Rfl:   •  levothyroxine (SYNTHROID, LEVOTHROID) 25 MCG tablet, Take 50 mcg by mouth Every Morning., Disp: , Rfl:   •  losartan (COZAAR) 100 MG tablet, Take 100 mg by mouth Every Evening., Disp: , Rfl:   •  metoprolol tartrate (LOPRESSOR) 50 MG tablet, Take 1 tablet by mouth Every 12 (Twelve) Hours., Disp: 180 tablet, Rfl: 3  No Known Allergies  Social History     Socioeconomic History   • Marital status:    • Number of children: 5   Tobacco Use   • Smoking status: Never   • Smokeless tobacco: Never   Vaping  "Use   • Vaping Use: Never used   Substance and Sexual Activity   • Alcohol use: No   • Drug use: No   • Sexual activity: Defer     Family History   Problem Relation Age of Onset   • Stroke Mother    • Emphysema Father      Review of Systems   Constitutional: Negative for chills, fever, malaise/fatigue, night sweats and weight loss.   HENT: Negative for hearing loss, odynophagia and sore throat.    Cardiovascular: Positive for leg swelling. Negative for chest pain, dyspnea on exertion, orthopnea and palpitations.   Respiratory: Negative for cough and hemoptysis.    Endocrine: Negative for cold intolerance, heat intolerance, polydipsia, polyphagia and polyuria.   Hematologic/Lymphatic: Does not bruise/bleed easily.   Skin: Negative for itching and rash.   Musculoskeletal: Negative for joint pain, joint swelling and myalgias.   Gastrointestinal: Negative for abdominal pain, constipation, diarrhea, hematemesis, hematochezia, melena, nausea and vomiting.   Genitourinary: Negative for dysuria, frequency and hematuria.   Neurological: Negative for focal weakness, headaches, numbness and seizures.   Psychiatric/Behavioral: Negative for suicidal ideas.   All other systems reviewed and are negative.    Vitals:    12/01/22 1108   BP: (!) 183/95   Pulse: 68   Temp: 97.5 °F (36.4 °C)   SpO2: 97%   Weight: 72.6 kg (160 lb)   Height: 170.2 cm (67\")      Physical Exam   CONSTITUTIONAL:  Oriented x3, well-nourished, well developed, in no acute distress.  EYES:    Eyes anicteric.  EOMI.  PERRLA.   ENT:                Good dentition.  NECK:     Large mass in the left supraclavicular area.   LUNGS:           Decreased in the bases; no wheezing, rales or rhonchi.  CARDIOVASCULAR:  Regular rate and rhythm without murmur, rub or gallop.  There are no carotid bruits.  GI:     Abdomen is soft, nontender, nondistended with normal bowel sounds.  No hepatosplenomegaly and no masses.  EXTREMITIES:   No cyanosis, clubbing or edema.  SKIN:   No " ulcerations, petechiae or bruising.  MUSCULOSKELETAL:  Full range of motion with no deformity of extremities.  NEURO:  Cranial nerves II-XII, motor and sensory exams are intact.  PSYCH:  Alert and oriented; mood and affect good.    The ROS, past medical history, surgical history, family history, social history and vitals were reviewed by myself and corrected as needed.      Labs/Imaging:  I have obtained and reviewed the medical records from Dr. Hernandes, including the PET and pathology demonstrating a left neck mass. The patient is not a smoker, therefore no smoking cessation was discussed. A discussion was held regarding an advanced directive.    Assessment/Plan:    The patient is an 89-year-old  female who presents with a large left supraclavicular mass. I have personally discussed this case with Dr. Hernandes in [facility]. He wants to have this excised and more of a biopsy so they can appropriately treat the patient. I have personally examined the films and I concur with the interpretation of the radiologist. The MRI is negative for no evidence of disease of the brain. I have had a long discussion with her about the treatment, operation, risks and alternatives. She appears to be fully informed and desires to proceed.     Patient Active Problem List   Diagnosis   • Chronic renal insufficiency   • Anemia of chronic disease   • Hypothyroidism   • Essential hypertension   • Persistent atrial fibrillation (HCC)   • VTE (venous thromboembolism)   • Mediastinal lymphadenopathy   • Pneumomediastinum (HCC)   • Breast cancer (HCC)   • Pneumoperitoneum   • Neck mass       CC: DO Caroline Maldonado MD Regina Fugate editing for Manas Casanova MD    I, Manas Casanova MD, have read and agree with the editing done by Lakeisha Meléndez, .

## 2023-03-21 ENCOUNTER — OFFICE VISIT (OUTPATIENT)
Dept: CARDIOLOGY | Facility: CLINIC | Age: 53
End: 2023-03-21
Payer: MEDICARE

## 2023-03-21 VITALS
HEIGHT: 63 IN | BODY MASS INDEX: 22.54 KG/M2 | SYSTOLIC BLOOD PRESSURE: 122 MMHG | WEIGHT: 127.19 LBS | HEART RATE: 97 BPM | DIASTOLIC BLOOD PRESSURE: 79 MMHG

## 2023-03-21 DIAGNOSIS — Z71.89 ENCOUNTER FOR CARDIAC RISK COUNSELING: Primary | ICD-10-CM

## 2023-03-21 DIAGNOSIS — Z01.30 ENCOUNTER FOR EXAMINATION OF BLOOD PRESSURE WITHOUT ABNORMAL FINDINGS: ICD-10-CM

## 2023-03-21 DIAGNOSIS — R09.89 BRUIT: ICD-10-CM

## 2023-03-21 PROCEDURE — 99999 PR PBB SHADOW E&M-EST. PATIENT-LVL III: ICD-10-PCS | Mod: PBBFAC,,, | Performed by: INTERNAL MEDICINE

## 2023-03-21 PROCEDURE — 99213 OFFICE O/P EST LOW 20 MIN: CPT | Mod: PBBFAC,PO | Performed by: INTERNAL MEDICINE

## 2023-03-21 PROCEDURE — 99204 OFFICE O/P NEW MOD 45 MIN: CPT | Mod: S$PBB,25,, | Performed by: INTERNAL MEDICINE

## 2023-03-21 PROCEDURE — 99204 PR OFFICE/OUTPT VISIT, NEW, LEVL IV, 45-59 MIN: ICD-10-PCS | Mod: S$PBB,25,, | Performed by: INTERNAL MEDICINE

## 2023-03-21 PROCEDURE — 93005 ELECTROCARDIOGRAM TRACING: CPT | Mod: PO

## 2023-03-21 PROCEDURE — 93010 EKG 12-LEAD: ICD-10-PCS | Mod: ,,, | Performed by: INTERNAL MEDICINE

## 2023-03-21 PROCEDURE — 93010 ELECTROCARDIOGRAM REPORT: CPT | Mod: ,,, | Performed by: INTERNAL MEDICINE

## 2023-03-21 PROCEDURE — 99999 PR PBB SHADOW E&M-EST. PATIENT-LVL III: CPT | Mod: PBBFAC,,, | Performed by: INTERNAL MEDICINE

## 2023-03-21 NOTE — PROGRESS NOTES
Subjective:    Patient ID:  Nathalie Rodriguez is a 52 y.o. female who presents for evaluation of new patient (Intense neck pulse at night/)      Problem List Items Addressed This Visit    None  Visit Diagnoses       Encounter for cardiac risk counseling    -  Primary            HPI    Patient presents for evaluation of bounding pulse in neck    The patient states that she has been having issues with a strong pulsatile feeling in the back of her neck for the past few days. Sometimes in her ears if she is laying on them. Doesn't notice it during the day.    No chest pain.  No shortness of breath.  Works out a lot, feels well when she does it    Personal history of heart attack or stroke - None that she is aware of  Family history of heart disease - Dad with heart issues starting in his 60s, seemingly HTN, no MI    Past Medical History:   Diagnosis Date    Allergy     Hypothyroidism     Sleep apnea        Past Surgical History:   Procedure Laterality Date    COLONOSCOPY N/A 8/19/2022    Procedure: COLONOSCOPY;  Surgeon: Ras Buckner MD;  Location: [facility];  Service: Endoscopy;  Laterality: N/A;    Deep Brain Stimulation  07/2020    ESOPHAGOGASTRODUODENOSCOPY N/A 8/19/2022    Procedure: EGD (ESOPHAGOGASTRODUODENOSCOPY);  Surgeon: Ras Buckner MD;  Location: [facility];  Service: Endoscopy;  Laterality: N/A;    KNEE SURGERY      bilateral    MOUTH SURGERY      NOSE SURGERY         Family History   Problem Relation Age of Onset    Parkinsonism Father     Breast cancer Maternal Aunt     Ovarian cancer Maternal Aunt     Allergies Mother     Angioedema Neg Hx     Asthma Neg Hx     Atopy Neg Hx     Eczema Neg Hx     Immunodeficiency Neg Hx     Rhinitis Neg Hx     Urticaria Neg Hx     Glaucoma Neg Hx        Social History     Socioeconomic History    Marital status:    Tobacco Use    Smoking status: Never    Smokeless tobacco: Never   Substance and Sexual Activity    Alcohol use: Yes     Alcohol/week: 0.0  standard drinks     Comment: occasional    Drug use: No     Social Determinants of Health     Financial Resource Strain: Low Risk     Difficulty of Paying Living Expenses: Not hard at all   Food Insecurity: No Food Insecurity    Worried About Running Out of Food in the Last Year: Never true    Ran Out of Food in the Last Year: Never true   Transportation Needs: No Transportation Needs    Lack of Transportation (Medical): No    Lack of Transportation (Non-Medical): No   Physical Activity: Sufficiently Active    Days of Exercise per Week: 5 days    Minutes of Exercise per Session: 40 min   Stress: No Stress Concern Present    Feeling of Stress : Only a little   Social Connections: Unknown    Frequency of Communication with Friends and Family: More than three times a week    Frequency of Social Gatherings with Friends and Family: Once a week    Active Member of Clubs or Organizations: Yes    Attends Club or Organization Meetings: Never    Marital Status:    Housing Stability: Low Risk     Unable to Pay for Housing in the Last Year: No    Number of Places Lived in the Last Year: 1    Unstable Housing in the Last Year: No       Review of patient's allergies indicates:   Allergen Reactions    Codeine     Gluten protein     Hydrocodone     Sulfa (sulfonamide antibiotics)     Hydrocodone-acetaminophen Nausea Only       Review of Systems   Constitutional: Negative for decreased appetite, fever and malaise/fatigue.   Eyes:  Negative for blurred vision.   Cardiovascular:  Negative for chest pain, dyspnea on exertion, irregular heartbeat and leg swelling.   Respiratory:  Negative for cough, hemoptysis, shortness of breath and wheezing.    Endocrine: Negative for cold intolerance and heat intolerance.   Hematologic/Lymphatic: Negative for bleeding problem.   Musculoskeletal:  Negative for muscle weakness and myalgias.   Gastrointestinal:  Negative for abdominal pain, constipation and diarrhea.   Genitourinary:  Negative  "for bladder incontinence.   Neurological:  Negative for dizziness and weakness.   Psychiatric/Behavioral:  Negative for depression.       Objective:     Vitals:    03/21/23 1448   BP: 122/79   BP Location: Left arm   Patient Position: Sitting   BP Method: Medium (Automatic)   Pulse: 97   Weight: 57.7 kg (127 lb 3.3 oz)   Height: 5' 3" (1.6 m)        Physical Exam  Vitals and nursing note reviewed.   Constitutional:       General: She is not in acute distress.     Appearance: She is well-developed.   HENT:      Head: Normocephalic and atraumatic.   Neck:      Vascular: No JVD.   Cardiovascular:      Rate and Rhythm: Normal rate and regular rhythm.      Heart sounds: Normal heart sounds. No murmur heard.    No friction rub. No gallop.   Pulmonary:      Effort: Pulmonary effort is normal. No respiratory distress.      Breath sounds: Normal breath sounds. No wheezing or rales.   Abdominal:      General: Bowel sounds are normal.      Palpations: Abdomen is soft.      Tenderness: There is no abdominal tenderness. There is no guarding or rebound.   Musculoskeletal:         General: No tenderness.      Cervical back: Neck supple.   Skin:     General: Skin is warm and dry.   Neurological:      Mental Status: She is alert and oriented to person, place, and time.   Psychiatric:         Behavior: Behavior normal.           Current Outpatient Medications   Medication Instructions    cholecalciferol (vitamin D3) 5,000 Units, Oral, Every 7 days    cyclobenzaprine (FLEXERIL) 7.5 mg, Oral, Daily PRN    hydrocortisone 2.5 % cream Topical (Top), 2 times daily    levothyroxine (SYNTHROID) 88 mcg, Oral, Before breakfast, BRAND SYNTHROID    liothyronine (CYTOMEL) 5 MCG Tab TAKE 1 TABLET BY MOUTH EVERY DAY    meloxicam (MOBIC) 7.5 MG tablet TAKE 1 TABLET BY MOUTH DAILY AS NEEDED FOR PAIN    multivitamin (THERAGRAN) per tablet 1 tablet, Oral    UNABLE TO FIND medication name: Mail order Iron Supplement - 3 nightly       Lipid Panel:   Lab " Results   Component Value Date    CHOL 211 (H) 09/13/2022    HDL 85 (H) 09/13/2022    LDLCALC 110.8 09/13/2022    TRIG 76 09/13/2022    CHOLHDL 40.3 09/13/2022         The 10-year ASCVD risk score (Daryl RM, et al., 2019) is: 0.9%    Values used to calculate the score:      Age: 52 years      Sex: Female      Is Non- : No      Diabetic: No      Tobacco smoker: No      Systolic Blood Pressure: 122 mmHg      Is BP treated: No      HDL Cholesterol: 85 mg/dL      Total Cholesterol: 211 mg/dL    All pertinent labs, imaging, and EKGs reviewed.  Patient's most recent EKG tracing was personally interpreted by this provider.    Most Recent EKG Results  Results for orders placed or performed in visit on 07/02/20   EKG 12-lead    Collection Time: 07/02/20  3:20 PM    Narrative    Test Reason : Z01.818,Z01.818,    Vent. Rate : 068 BPM     Atrial Rate : 068 BPM     P-R Int : 166 ms          QRS Dur : 080 ms      QT Int : 390 ms       P-R-T Axes : 075 017 041 degrees     QTc Int : 414 ms    Normal sinus rhythm with sinus arrhythmia  Low voltage QRS  Borderline Abnormal ECG  No previous ECGs available  Confirmed by Nataly BETHEA, Hadley RAO ([id number]) on 7/6/2020 12:27:08 PM    Referred By: RAMÓN JIMENEZ           Confirmed By:Hadley Ingram MD       Most Recent Echocardiogram Results  No results found for this or any previous visit.      Most Recent Nuclear Stress Test Results  No results found for this or any previous visit.      Most Recent Cardiac PET Stress Test Results  No results found for this or any previous visit.      Most Recent Cardiovascular Angiogram results  No results found for this or any previous visit.      Other Most Recent Cardiology Results  No results found for this or any previous visit.        Assessment:       1. Encounter for cardiac risk counseling         Plan:     Symptoms of pulsations in the back of the neck and pulsatile tinnitus  BP OK  Most recent lipid panel reviewed personally      Echocardiogram   Bilateral carotid Doppler     Continue other cardiac medications  Mediterranean Diet/Cardiovascular Exercise Program    Patient queried and all questions were answered.    F/u in 9 months to reassess      Signed:    Trevon Kay MD  3/21/2023 9:20 AM

## 2023-04-06 ENCOUNTER — CLINICAL SUPPORT (OUTPATIENT)
Dept: CARDIOLOGY | Facility: HOSPITAL | Age: 53
End: 2023-04-06
Attending: INTERNAL MEDICINE
Payer: MEDICARE

## 2023-04-06 VITALS
WEIGHT: 127 LBS | SYSTOLIC BLOOD PRESSURE: 122 MMHG | HEIGHT: 63 IN | BODY MASS INDEX: 22.5 KG/M2 | DIASTOLIC BLOOD PRESSURE: 79 MMHG

## 2023-04-06 DIAGNOSIS — R09.89 BRUIT: ICD-10-CM

## 2023-04-06 DIAGNOSIS — Z01.30 ENCOUNTER FOR EXAMINATION OF BLOOD PRESSURE WITHOUT ABNORMAL FINDINGS: ICD-10-CM

## 2023-04-06 LAB
ASCENDING AORTA: 2.82 CM
AV INDEX (PROSTH): 0.61
AV MEAN GRADIENT: 4 MMHG
AV PEAK GRADIENT: 8 MMHG
AV VALVE AREA: 2.3 CM2
AV VELOCITY RATIO: 0.62
BSA FOR ECHO PROCEDURE: 1.6 M2
CV ECHO LV RWT: 0.35 CM
DOP CALC AO PEAK VEL: 1.43 M/S
DOP CALC AO VTI: 28.4 CM
DOP CALC LVOT AREA: 3.8 CM2
DOP CALC LVOT DIAMETER: 2.2 CM
DOP CALC LVOT PEAK VEL: 0.89 M/S
DOP CALC LVOT STROKE VOLUME: 65.35 CM3
DOP CALCLVOT PEAK VEL VTI: 17.2 CM
E WAVE DECELERATION TIME: 122.26 MSEC
E/A RATIO: 0.63
E/E' RATIO: 3.58 M/S
ECHO LV POSTERIOR WALL: 0.77 CM (ref 0.6–1.1)
EJECTION FRACTION: 60 %
FRACTIONAL SHORTENING: 28 % (ref 28–44)
INTERVENTRICULAR SEPTUM: 0.7 CM (ref 0.6–1.1)
LA MAJOR: 3.96 CM
LA MINOR: 3.83 CM
LA WIDTH: 3.3 CM
LEFT ARM DIASTOLIC BLOOD PRESSURE: 79 MMHG
LEFT ARM SYSTOLIC BLOOD PRESSURE: 122 MMHG
LEFT ATRIUM SIZE: 2.25 CM
LEFT ATRIUM VOLUME INDEX: 15.5 ML/M2
LEFT ATRIUM VOLUME: 24.58 CM3
LEFT CBA DIAS: 22 CM/S
LEFT CBA SYS: 81 CM/S
LEFT CCA DIST DIAS: 24 CM/S
LEFT CCA DIST SYS: 88 CM/S
LEFT CCA MID DIAS: 25 CM/S
LEFT CCA MID SYS: 101 CM/S
LEFT CCA PROX DIAS: 21 CM/S
LEFT CCA PROX SYS: 94 CM/S
LEFT ECA DIAS: 18 CM/S
LEFT ECA SYS: 98 CM/S
LEFT ICA DIST DIAS: 37 CM/S
LEFT ICA DIST SYS: 100 CM/S
LEFT ICA MID DIAS: 45 CM/S
LEFT ICA MID SYS: 100 CM/S
LEFT ICA PROX DIAS: 15 CM/S
LEFT ICA PROX SYS: 60 CM/S
LEFT INTERNAL DIMENSION IN SYSTOLE: 3.15 CM (ref 2.1–4)
LEFT VENTRICLE DIASTOLIC VOLUME INDEX: 54.55 ML/M2
LEFT VENTRICLE DIASTOLIC VOLUME: 86.74 ML
LEFT VENTRICLE MASS INDEX: 61 G/M2
LEFT VENTRICLE SYSTOLIC VOLUME INDEX: 24.8 ML/M2
LEFT VENTRICLE SYSTOLIC VOLUME: 39.46 ML
LEFT VENTRICULAR INTERNAL DIMENSION IN DIASTOLE: 4.38 CM (ref 3.5–6)
LEFT VENTRICULAR MASS: 97.26 G
LEFT VERTEBRAL DIAS: 19 CM/S
LEFT VERTEBRAL SYS: 68 CM/S
LV LATERAL E/E' RATIO: 3.91 M/S
LV SEPTAL E/E' RATIO: 3.31 M/S
LVOT MG: 2.03 MMHG
LVOT MV: 0.7 CM/S
MV PEAK A VEL: 0.68 M/S
MV PEAK E VEL: 0.43 M/S
MV STENOSIS PRESSURE HALF TIME: 35.46 MS
MV VALVE AREA P 1/2 METHOD: 6.2 CM2
OHS CV CAROTID RIGHT ICA EDV HIGHEST: 30
OHS CV CAROTID ULTRASOUND LEFT ICA/CCA RATIO: 1.14
OHS CV CAROTID ULTRASOUND RIGHT ICA/CCA RATIO: 0.94
OHS CV PV CAROTID LEFT HIGHEST CCA: 101
OHS CV PV CAROTID LEFT HIGHEST ICA: 100
OHS CV PV CAROTID RIGHT HIGHEST CCA: 110
OHS CV PV CAROTID RIGHT HIGHEST ICA: 103
OHS CV US CAROTID LEFT HIGHEST EDV: 45
PISA TR MAX VEL: 1.91 M/S
RA MAJOR: 3.85 CM
RA PRESSURE: 3 MMHG
RIGHT ARM DIASTOLIC BLOOD PRESSURE: 79 MMHG
RIGHT ARM SYSTOLIC BLOOD PRESSURE: 122 MMHG
RIGHT CBA DIAS: 25 CM/S
RIGHT CBA SYS: 97 CM/S
RIGHT CCA DIST DIAS: 31 CM/S
RIGHT CCA DIST SYS: 110 CM/S
RIGHT CCA MID DIAS: 24 CM/S
RIGHT CCA MID SYS: 106 CM/S
RIGHT CCA PROX DIAS: 23 CM/S
RIGHT CCA PROX SYS: 96 CM/S
RIGHT ECA DIAS: 20 CM/S
RIGHT ECA SYS: 93 CM/S
RIGHT ICA DIST DIAS: 30 CM/S
RIGHT ICA DIST SYS: 103 CM/S
RIGHT ICA MID DIAS: 28 CM/S
RIGHT ICA MID SYS: 100 CM/S
RIGHT ICA PROX DIAS: 25 CM/S
RIGHT ICA PROX SYS: 100 CM/S
RIGHT VENTRICULAR END-DIASTOLIC DIMENSION: 2.74 CM
RIGHT VENTRICULAR LENGTH IN DIASTOLE (APICAL 4-CHAMBER VIEW): 7.3 CM
RIGHT VERTEBRAL DIAS: 15 CM/S
RIGHT VERTEBRAL SYS: 70 CM/S
RV MID DIAMA: 1.73 CM
RV TISSUE DOPPLER FREE WALL SYSTOLIC VELOCITY 1 (APICAL 4 CHAMBER VIEW): 0.01 CM/S
SINUS: 2.67 CM
STJ: 2.45 CM
TDI LATERAL: 0.11 M/S
TDI SEPTAL: 0.13 M/S
TDI: 0.12 M/S
TR MAX PG: 15 MMHG
TRICUSPID ANNULAR PLANE SYSTOLIC EXCURSION: 2.51 CM
TV REST PULMONARY ARTERY PRESSURE: 18 MMHG

## 2023-04-06 PROCEDURE — 93306 TTE W/DOPPLER COMPLETE: CPT | Mod: 26,,, | Performed by: INTERNAL MEDICINE

## 2023-04-06 PROCEDURE — 93880 EXTRACRANIAL BILAT STUDY: CPT | Mod: 26,,, | Performed by: INTERNAL MEDICINE

## 2023-04-06 PROCEDURE — 93306 ECHO (CUPID ONLY): ICD-10-PCS | Mod: 26,,, | Performed by: INTERNAL MEDICINE

## 2023-04-06 PROCEDURE — 93880 EXTRACRANIAL BILAT STUDY: CPT | Mod: PO

## 2023-04-06 PROCEDURE — 93306 TTE W/DOPPLER COMPLETE: CPT | Mod: PO

## 2023-04-06 PROCEDURE — 93880 CV US DOPPLER CAROTID (CUPID ONLY): ICD-10-PCS | Mod: 26,,, | Performed by: INTERNAL MEDICINE

## 2023-04-28 ENCOUNTER — OFFICE VISIT (OUTPATIENT)
Dept: ENDOCRINOLOGY | Facility: CLINIC | Age: 53
End: 2023-04-28
Payer: MEDICARE

## 2023-04-28 DIAGNOSIS — E03.9 HYPOTHYROIDISM, UNSPECIFIED TYPE: Primary | ICD-10-CM

## 2023-04-28 PROCEDURE — 99213 OFFICE O/P EST LOW 20 MIN: CPT | Mod: 95,,, | Performed by: INTERNAL MEDICINE

## 2023-04-28 PROCEDURE — 99213 PR OFFICE/OUTPT VISIT, EST, LEVL III, 20-29 MIN: ICD-10-PCS | Mod: 95,,, | Performed by: INTERNAL MEDICINE

## 2023-04-28 RX ORDER — LIOTHYRONINE SODIUM 5 UG/1
5 TABLET ORAL DAILY
Qty: 90 TABLET | Refills: 3 | Status: SHIPPED | OUTPATIENT
Start: 2023-04-28 | End: 2023-05-10 | Stop reason: SDUPTHER

## 2023-04-28 RX ORDER — LEVOTHYROXINE SODIUM 88 UG/1
88 TABLET ORAL
Qty: 90 TABLET | Refills: 3 | Status: SHIPPED | OUTPATIENT
Start: 2023-04-28 | End: 2023-05-10 | Stop reason: SDUPTHER

## 2023-04-28 NOTE — PROGRESS NOTES
6The patient location is: [place]    Visit type: audiovisual    Face to Face time with patient: 6  8 minutes of total time spent on the encounter, which includes face to face time and non-face to face time preparing to see the patient (eg, review of tests), Obtaining and/or reviewing separately obtained history, Documenting clinical information in the electronic or other health record, Independently interpreting results (not separately reported) and communicating results to the patient/family/caregiver, or Care coordination (not separately reported).         Each patient to whom he or she provides medical services by telemedicine is:  (1) informed of the relationship between the physician and patient and the respective role of any other health care provider with respect to management of the patient; and (2) notified that he or she may decline to receive medical services by telemedicine and may withdraw from such care at any time.    Notes:      CHIEF COMPLAINT: Hypothyroid  52 y.o.  being seen as a f/u. Hypothyroidism x 25 years. On synthroid 88 mcg and cytomel 5 mcg daily. No palpitations. No tremors. Has some alternating diarrhea and constipation. Nothing consistent. NO significant fatigue. No CP. No SOB.           PAST MEDICAL HISTORY/PAST SURGICAL HISTORY:  Reviewed in [facility]    SOCIAL HISTORY: No Tobacco. Social alcohol.     FAMILY HISTORY:  Sister hypothyroid. No DM 2. Parkinsons.     MEDICATIONS/ALLERGIES: The patient's MedCard has been updated and reviewed.          PE:  Virtual Visit                ASSESSMENT/PLAN:  1. Hypothyroidism- has some alternating diarrhea and constipation. Will check Thyroid Function test to assess whether thyroid causing symptoms. If stable, ok to f/u with PCP    FOLLOWUP  Fax Prescription to [phone]  Needs TSH, Ft4, T3  F/U 1 year- TSH, Ft4, T3.

## 2023-05-04 ENCOUNTER — LAB VISIT (OUTPATIENT)
Dept: LAB | Facility: HOSPITAL | Age: 53
End: 2023-05-04
Attending: INTERNAL MEDICINE
Payer: MEDICARE

## 2023-05-04 DIAGNOSIS — E03.9 HYPOTHYROIDISM, UNSPECIFIED TYPE: ICD-10-CM

## 2023-05-04 LAB
T3 SERPL-MCNC: 71 NG/DL (ref 60–180)
T4 FREE SERPL-MCNC: 0.91 NG/DL (ref 0.71–1.51)
TSH SERPL DL<=0.005 MIU/L-ACNC: 0.08 UIU/ML (ref 0.4–4)

## 2023-05-04 PROCEDURE — 84480 ASSAY TRIIODOTHYRONINE (T3): CPT | Performed by: INTERNAL MEDICINE

## 2023-05-04 PROCEDURE — 84439 ASSAY OF FREE THYROXINE: CPT | Performed by: INTERNAL MEDICINE

## 2023-05-04 PROCEDURE — 36415 COLL VENOUS BLD VENIPUNCTURE: CPT | Mod: PO | Performed by: INTERNAL MEDICINE

## 2023-05-04 PROCEDURE — 84443 ASSAY THYROID STIM HORMONE: CPT | Performed by: INTERNAL MEDICINE

## 2023-05-10 ENCOUNTER — PATIENT MESSAGE (OUTPATIENT)
Dept: ENDOCRINOLOGY | Facility: CLINIC | Age: 53
End: 2023-05-10
Payer: MEDICARE

## 2023-05-10 RX ORDER — LEVOTHYROXINE SODIUM 88 UG/1
88 TABLET ORAL
Qty: 90 TABLET | Refills: 3 | Status: SHIPPED | OUTPATIENT
Start: 2023-05-10 | End: 2023-05-13

## 2023-05-10 RX ORDER — LIOTHYRONINE SODIUM 5 UG/1
5 TABLET ORAL DAILY
Qty: 90 TABLET | Refills: 3 | Status: SHIPPED | OUTPATIENT
Start: 2023-05-10

## 2023-05-13 ENCOUNTER — TELEPHONE (OUTPATIENT)
Dept: ENDOCRINOLOGY | Facility: CLINIC | Age: 53
End: 2023-05-13
Payer: MEDICARE

## 2023-05-13 DIAGNOSIS — E03.9 HYPOTHYROIDISM, UNSPECIFIED TYPE: Primary | ICD-10-CM

## 2023-05-13 RX ORDER — LEVOTHYROXINE SODIUM 75 UG/1
75 TABLET ORAL
Qty: 30 TABLET | Refills: 11 | Status: SHIPPED | OUTPATIENT
Start: 2023-05-13 | End: 2023-05-26 | Stop reason: SDUPTHER

## 2023-05-25 ENCOUNTER — PATIENT MESSAGE (OUTPATIENT)
Dept: ENDOCRINOLOGY | Facility: CLINIC | Age: 53
End: 2023-05-25
Payer: MEDICARE

## 2023-05-26 ENCOUNTER — PATIENT MESSAGE (OUTPATIENT)
Dept: ENDOCRINOLOGY | Facility: CLINIC | Age: 53
End: 2023-05-26
Payer: MEDICARE

## 2023-05-26 DIAGNOSIS — E03.9 HYPOTHYROIDISM, UNSPECIFIED TYPE: Primary | ICD-10-CM

## 2023-05-26 RX ORDER — LEVOTHYROXINE SODIUM 75 UG/1
75 TABLET ORAL
Qty: 30 TABLET | Refills: 11 | Status: SHIPPED | OUTPATIENT
Start: 2023-05-26 | End: 2023-08-08

## 2023-06-06 ENCOUNTER — PATIENT MESSAGE (OUTPATIENT)
Dept: ENDOCRINOLOGY | Facility: CLINIC | Age: 53
End: 2023-06-06
Payer: MEDICARE

## 2023-06-21 ENCOUNTER — PATIENT MESSAGE (OUTPATIENT)
Dept: ENDOCRINOLOGY | Facility: CLINIC | Age: 53
End: 2023-06-21
Payer: MEDICARE

## 2023-07-06 ENCOUNTER — LAB VISIT (OUTPATIENT)
Dept: LAB | Facility: HOSPITAL | Age: 53
End: 2023-07-06
Attending: INTERNAL MEDICINE
Payer: MEDICARE

## 2023-07-06 DIAGNOSIS — D64.9 ANEMIA, UNSPECIFIED TYPE: ICD-10-CM

## 2023-07-06 DIAGNOSIS — G20.A1 PARKINSON'S DISEASE: ICD-10-CM

## 2023-07-06 DIAGNOSIS — E53.8 FOLATE DEFICIENCY: ICD-10-CM

## 2023-07-06 DIAGNOSIS — G25.79 OTHER DRUG INDUCED MOVEMENT DISORDERS: ICD-10-CM

## 2023-07-06 DIAGNOSIS — Z79.899 ENCOUNTER FOR LONG-TERM (CURRENT) USE OF MEDICATIONS: ICD-10-CM

## 2023-07-06 LAB
25(OH)D3+25(OH)D2 SERPL-MCNC: 53 NG/ML (ref 30–96)
ALBUMIN SERPL BCP-MCNC: 4 G/DL (ref 3.5–5.2)
ALP SERPL-CCNC: 41 U/L (ref 55–135)
ALT SERPL W/O P-5'-P-CCNC: 17 U/L (ref 10–44)
ANION GAP SERPL CALC-SCNC: 8 MMOL/L (ref 8–16)
AST SERPL-CCNC: 19 U/L (ref 10–40)
BASOPHILS # BLD AUTO: 0.02 K/UL (ref 0–0.2)
BASOPHILS NFR BLD: 0.5 % (ref 0–1.9)
BILIRUB SERPL-MCNC: 0.5 MG/DL (ref 0.1–1)
BUN SERPL-MCNC: 11 MG/DL (ref 6–20)
CALCIUM SERPL-MCNC: 9.7 MG/DL (ref 8.7–10.5)
CHLORIDE SERPL-SCNC: 107 MMOL/L (ref 95–110)
CO2 SERPL-SCNC: 27 MMOL/L (ref 23–29)
CREAT SERPL-MCNC: 0.9 MG/DL (ref 0.5–1.4)
DIFFERENTIAL METHOD: ABNORMAL
EOSINOPHIL # BLD AUTO: 0.1 K/UL (ref 0–0.5)
EOSINOPHIL NFR BLD: 1.9 % (ref 0–8)
ERYTHROCYTE [DISTWIDTH] IN BLOOD BY AUTOMATED COUNT: 13.9 % (ref 11.5–14.5)
EST. GFR  (NO RACE VARIABLE): >60 ML/MIN/1.73 M^2
FERRITIN SERPL-MCNC: 70 NG/ML (ref 20–300)
FOLATE SERPL-MCNC: 13.4 NG/ML (ref 4–24)
GLUCOSE SERPL-MCNC: 99 MG/DL (ref 70–110)
HCT VFR BLD AUTO: 37.2 % (ref 37–48.5)
HGB BLD-MCNC: 12 G/DL (ref 12–16)
IMM GRANULOCYTES # BLD AUTO: 0.01 K/UL (ref 0–0.04)
IMM GRANULOCYTES NFR BLD AUTO: 0.2 % (ref 0–0.5)
IRON SERPL-MCNC: 119 UG/DL (ref 30–160)
LYMPHOCYTES # BLD AUTO: 1.4 K/UL (ref 1–4.8)
LYMPHOCYTES NFR BLD: 32.5 % (ref 18–48)
MCH RBC QN AUTO: 32 PG (ref 27–31)
MCHC RBC AUTO-ENTMCNC: 32.3 G/DL (ref 32–36)
MCV RBC AUTO: 99 FL (ref 82–98)
MONOCYTES # BLD AUTO: 0.2 K/UL (ref 0.3–1)
MONOCYTES NFR BLD: 5.3 % (ref 4–15)
NEUTROPHILS # BLD AUTO: 2.5 K/UL (ref 1.8–7.7)
NEUTROPHILS NFR BLD: 59.6 % (ref 38–73)
NRBC BLD-RTO: 0 /100 WBC
PLATELET # BLD AUTO: 245 K/UL (ref 150–450)
PMV BLD AUTO: 9.8 FL (ref 9.2–12.9)
POTASSIUM SERPL-SCNC: 3.9 MMOL/L (ref 3.5–5.1)
PROT SERPL-MCNC: 7.2 G/DL (ref 6–8.4)
RBC # BLD AUTO: 3.75 M/UL (ref 4–5.4)
SATURATED IRON: 34 % (ref 20–50)
SODIUM SERPL-SCNC: 142 MMOL/L (ref 136–145)
TOTAL IRON BINDING CAPACITY: 348 UG/DL (ref 250–450)
TRANSFERRIN SERPL-MCNC: 235 MG/DL (ref 200–375)
VIT B12 SERPL-MCNC: 349 PG/ML (ref 210–950)
WBC # BLD AUTO: 4.19 K/UL (ref 3.9–12.7)

## 2023-07-06 PROCEDURE — 36415 COLL VENOUS BLD VENIPUNCTURE: CPT | Mod: PO | Performed by: INTERNAL MEDICINE

## 2023-07-06 PROCEDURE — 82746 ASSAY OF FOLIC ACID SERUM: CPT | Performed by: INTERNAL MEDICINE

## 2023-07-06 PROCEDURE — 80053 COMPREHEN METABOLIC PANEL: CPT | Performed by: INTERNAL MEDICINE

## 2023-07-06 PROCEDURE — 82728 ASSAY OF FERRITIN: CPT | Performed by: INTERNAL MEDICINE

## 2023-07-06 PROCEDURE — 84466 ASSAY OF TRANSFERRIN: CPT | Performed by: INTERNAL MEDICINE

## 2023-07-06 PROCEDURE — 82306 VITAMIN D 25 HYDROXY: CPT | Performed by: INTERNAL MEDICINE

## 2023-07-06 PROCEDURE — 85025 COMPLETE CBC W/AUTO DIFF WBC: CPT | Performed by: INTERNAL MEDICINE

## 2023-07-06 PROCEDURE — 82607 VITAMIN B-12: CPT | Performed by: INTERNAL MEDICINE

## 2023-08-03 ENCOUNTER — LAB VISIT (OUTPATIENT)
Dept: LAB | Facility: HOSPITAL | Age: 53
End: 2023-08-03
Attending: INTERNAL MEDICINE
Payer: MEDICARE

## 2023-08-03 DIAGNOSIS — E03.9 HYPOTHYROIDISM, UNSPECIFIED TYPE: ICD-10-CM

## 2023-08-03 LAB
T4 FREE SERPL-MCNC: 0.95 NG/DL (ref 0.71–1.51)
TSH SERPL DL<=0.005 MIU/L-ACNC: 0.26 UIU/ML (ref 0.4–4)

## 2023-08-03 PROCEDURE — 84443 ASSAY THYROID STIM HORMONE: CPT | Performed by: INTERNAL MEDICINE

## 2023-08-03 PROCEDURE — 36415 COLL VENOUS BLD VENIPUNCTURE: CPT | Mod: PO | Performed by: INTERNAL MEDICINE

## 2023-08-03 PROCEDURE — 84439 ASSAY OF FREE THYROXINE: CPT | Performed by: INTERNAL MEDICINE

## 2023-08-07 ENCOUNTER — PATIENT MESSAGE (OUTPATIENT)
Dept: ENDOCRINOLOGY | Facility: CLINIC | Age: 53
End: 2023-08-07
Payer: MEDICARE

## 2023-08-07 DIAGNOSIS — E03.9 HYPOTHYROIDISM, UNSPECIFIED TYPE: Primary | ICD-10-CM

## 2023-08-08 RX ORDER — LEVOTHYROXINE SODIUM 50 UG/1
50 TABLET ORAL
Qty: 30 TABLET | Refills: 11 | Status: SHIPPED | OUTPATIENT
Start: 2023-08-08 | End: 2023-08-08 | Stop reason: SDUPTHER

## 2023-08-08 RX ORDER — LEVOTHYROXINE SODIUM 50 UG/1
50 TABLET ORAL
Qty: 90 TABLET | Refills: 3 | OUTPATIENT
Start: 2023-08-08 | End: 2023-08-11 | Stop reason: SDUPTHER

## 2023-08-11 ENCOUNTER — PATIENT MESSAGE (OUTPATIENT)
Dept: ENDOCRINOLOGY | Facility: CLINIC | Age: 53
End: 2023-08-11
Payer: MEDICARE

## 2023-08-11 DIAGNOSIS — E03.9 HYPOTHYROIDISM, UNSPECIFIED TYPE: Primary | ICD-10-CM

## 2023-08-11 RX ORDER — LEVOTHYROXINE SODIUM 50 UG/1
50 TABLET ORAL
Qty: 90 TABLET | Refills: 3 | OUTPATIENT
Start: 2023-08-11 | End: 2023-08-16 | Stop reason: SDUPTHER

## 2023-08-16 ENCOUNTER — PATIENT MESSAGE (OUTPATIENT)
Dept: ENDOCRINOLOGY | Facility: CLINIC | Age: 53
End: 2023-08-16
Payer: MEDICARE

## 2023-08-16 DIAGNOSIS — E03.9 HYPOTHYROIDISM, UNSPECIFIED TYPE: ICD-10-CM

## 2023-08-16 RX ORDER — LEVOTHYROXINE SODIUM 50 UG/1
50 TABLET ORAL
Qty: 90 TABLET | Refills: 3 | OUTPATIENT
Start: 2023-08-16 | End: 2023-08-21 | Stop reason: SDUPTHER

## 2023-08-21 ENCOUNTER — PATIENT MESSAGE (OUTPATIENT)
Dept: ENDOCRINOLOGY | Facility: CLINIC | Age: 53
End: 2023-08-21
Payer: MEDICARE

## 2023-08-21 DIAGNOSIS — E03.9 HYPOTHYROIDISM, UNSPECIFIED TYPE: ICD-10-CM

## 2023-08-21 RX ORDER — LEVOTHYROXINE SODIUM 50 UG/1
50 TABLET ORAL
Qty: 90 TABLET | Refills: 3 | OUTPATIENT
Start: 2023-08-21 | End: 2023-08-23 | Stop reason: SDUPTHER

## 2023-08-23 ENCOUNTER — PATIENT MESSAGE (OUTPATIENT)
Dept: ENDOCRINOLOGY | Facility: CLINIC | Age: 53
End: 2023-08-23
Payer: MEDICARE

## 2023-08-23 DIAGNOSIS — E03.9 HYPOTHYROIDISM, UNSPECIFIED TYPE: ICD-10-CM

## 2023-08-23 RX ORDER — LEVOTHYROXINE SODIUM 50 UG/1
50 TABLET ORAL
Qty: 90 TABLET | Refills: 3 | Status: SHIPPED | OUTPATIENT
Start: 2023-08-23 | End: 2024-08-22

## 2023-08-23 RX ORDER — LEVOTHYROXINE SODIUM 50 UG/1
50 TABLET ORAL
Qty: 90 TABLET | Refills: 3 | OUTPATIENT
Start: 2023-08-23 | End: 2023-08-23 | Stop reason: SDUPTHER

## 2023-09-05 ENCOUNTER — PATIENT MESSAGE (OUTPATIENT)
Dept: ENDOCRINOLOGY | Facility: CLINIC | Age: 53
End: 2023-09-05
Payer: MEDICARE

## 2023-09-22 ENCOUNTER — PATIENT MESSAGE (OUTPATIENT)
Dept: ENDOCRINOLOGY | Facility: CLINIC | Age: 53
End: 2023-09-22
Payer: MEDICARE

## 2023-09-25 ENCOUNTER — LAB VISIT (OUTPATIENT)
Dept: LAB | Facility: HOSPITAL | Age: 53
End: 2023-09-25
Attending: INTERNAL MEDICINE
Payer: MEDICARE

## 2023-09-25 DIAGNOSIS — E03.9 HYPOTHYROIDISM, UNSPECIFIED TYPE: ICD-10-CM

## 2023-09-25 LAB
T3 SERPL-MCNC: 84 NG/DL (ref 60–180)
T4 FREE SERPL-MCNC: 0.79 NG/DL (ref 0.71–1.51)
TSH SERPL DL<=0.005 MIU/L-ACNC: 2.48 UIU/ML (ref 0.4–4)

## 2023-09-25 PROCEDURE — 84439 ASSAY OF FREE THYROXINE: CPT | Performed by: INTERNAL MEDICINE

## 2023-09-25 PROCEDURE — 84480 ASSAY TRIIODOTHYRONINE (T3): CPT | Performed by: INTERNAL MEDICINE

## 2023-09-25 PROCEDURE — 84443 ASSAY THYROID STIM HORMONE: CPT | Performed by: INTERNAL MEDICINE

## 2023-09-25 PROCEDURE — 36415 COLL VENOUS BLD VENIPUNCTURE: CPT | Mod: PO | Performed by: INTERNAL MEDICINE

## 2024-01-28 PROBLEM — N95.1 HOT FLASH, MENOPAUSAL: Status: ACTIVE | Noted: 2023-03-14

## 2024-01-28 PROBLEM — M79.18 RIGHT BUTTOCK PAIN: Status: RESOLVED | Noted: 2020-12-21 | Resolved: 2024-01-28

## 2024-01-28 PROBLEM — M75.41 IMPINGEMENT SYNDROME OF RIGHT SHOULDER: Status: ACTIVE | Noted: 2022-01-03

## 2024-01-28 PROBLEM — F41.9 ANXIETY: Status: ACTIVE | Noted: 2017-10-09

## 2024-01-28 PROBLEM — S46.011A TRAUMATIC COMPLETE TEAR OF RIGHT ROTATOR CUFF: Status: ACTIVE | Noted: 2022-03-11

## 2024-03-27 ENCOUNTER — OFFICE VISIT (OUTPATIENT)
Dept: OPTOMETRY | Facility: CLINIC | Age: 54
End: 2024-03-27
Payer: MEDICARE

## 2024-03-27 DIAGNOSIS — Z13.5 GLAUCOMA SCREENING: ICD-10-CM

## 2024-03-27 DIAGNOSIS — H52.4 MYOPIA WITH ASTIGMATISM AND PRESBYOPIA, BILATERAL: ICD-10-CM

## 2024-03-27 DIAGNOSIS — H02.421 MYOGENIC PTOSIS OF EYELID, RIGHT: ICD-10-CM

## 2024-03-27 DIAGNOSIS — G20.A1 PARKINSON'S DISEASE, UNSPECIFIED WHETHER DYSKINESIA PRESENT, UNSPECIFIED WHETHER MANIFESTATIONS FLUCTUATE: ICD-10-CM

## 2024-03-27 DIAGNOSIS — H52.13 MYOPIA WITH ASTIGMATISM AND PRESBYOPIA, BILATERAL: ICD-10-CM

## 2024-03-27 DIAGNOSIS — H52.203 MYOPIA WITH ASTIGMATISM AND PRESBYOPIA, BILATERAL: ICD-10-CM

## 2024-03-27 DIAGNOSIS — H43.393 VITREOUS FLOATERS, BILATERAL: Primary | ICD-10-CM

## 2024-03-27 PROCEDURE — 99999 PR PBB SHADOW E&M-EST. PATIENT-LVL III: CPT | Mod: PBBFAC,,, | Performed by: OPTOMETRIST

## 2024-03-27 PROCEDURE — 92250 FUNDUS PHOTOGRAPHY W/I&R: CPT | Mod: PBBFAC,PO | Performed by: OPTOMETRIST

## 2024-03-27 PROCEDURE — 92004 COMPRE OPH EXAM NEW PT 1/>: CPT | Mod: S$PBB,,, | Performed by: OPTOMETRIST

## 2024-03-27 PROCEDURE — 99213 OFFICE O/P EST LOW 20 MIN: CPT | Mod: PBBFAC,PO,25 | Performed by: OPTOMETRIST

## 2024-03-27 NOTE — PROGRESS NOTES
HPI    Routine eye exam-dle-1 year    Pt complains of blurred va at distance and near. Wearing old glasses rx.   Feels like she never could get used to progressive lens from last eye   doctor. [place] like new glasses made her dizzy. Denies any flashes or   floaters. Complains of [place] twitching-getting enough sleep and only one cup   of coffee.   Last edited by Blanca Pickett on 3/27/2024 10:57 AM.            Assessment /Plan     For exam results, see Encounter Report.    Vitreous floaters, bilateral    Parkinson's disease, unspecified whether dyskinesia present, unspecified whether manifestations fluctuate    Myogenic ptosis of eyelid, right    Glaucoma screening    Myopia with astigmatism and presbyopia, bilateral      Defers DFE --OPTOS 3/2024 --High myopia // RD precautions given and reviewed. Patient knows to call/ message if any further changes in symptoms occur.  No new ocular manifestations // see #3  Dx myogenic ptosis OD w/ Dr Williamson --has full lid blink and no gross imbalance between eyes ---will consult for sx if worsening   Not suspect   Updated specs rx gave copy, discussed options --cautions ordering PAL online // fill prn --ok continue with current     Discussed and educated patient on current findings /plan.  RTC 1 year, prn if any changes / issues

## 2024-04-21 NOTE — PROGRESS NOTES
"Subjective:    Patient ID:  Nathalie Rodriguez is a 53 y.o. female who presents for follow-up of Follow-up (9month)      Problem List Items Addressed This Visit          Neuro    Parkinson's disease    Overview     2013 Bradykinesia and left resting tremor.          Other Visit Diagnoses       Encounter for cardiac risk counseling    -  Primary            [facility] [place]    Patient was last seen on 03/21/2023 at which time echocardiogram and bilateral carotid Doppler was ordered for evaluation.  Echocardiogram showed preserved ejection fraction without acute abnormalities and carotid Doppler showed no significant stenosis.    On assessment today, the patient states that she feels well.    No chest pain.  No shortness of breath.         Objective:     Vitals:    04/22/24 1056   BP: 132/81   BP Location: Right arm   Patient Position: Sitting   BP Method: Medium (Automatic)   Pulse: 78   Weight: 59.5 kg (131 lb 2.8 oz)   Height: 5' 2" (1.575 m)       BP Readings from Last 5 Encounters:   04/22/24 132/81   01/29/24 110/78   07/10/23 108/68   04/06/23 122/79   03/21/23 122/79        Physical Exam  Vitals and nursing note reviewed.   Constitutional:       General: She is not in acute distress.     Appearance: She is well-developed.   HENT:      Head: Normocephalic and atraumatic.   Neck:      Vascular: No JVD.   Cardiovascular:      Rate and Rhythm: Normal rate and regular rhythm.      Heart sounds: Normal heart sounds. No murmur heard.     No friction rub. No gallop.   Pulmonary:      Effort: Pulmonary effort is normal. No respiratory distress.      Breath sounds: Normal breath sounds. No wheezing or rales.   Abdominal:      General: Bowel sounds are normal.      Palpations: Abdomen is soft.      Tenderness: There is no abdominal tenderness. There is no guarding or rebound.   Musculoskeletal:         General: No tenderness.      Cervical back: Neck supple.   Skin:     General: Skin is warm and dry.   Neurological:      Mental Status: She " is alert and oriented to person, place, and time.   Psychiatric:         Behavior: Behavior normal.             Current Outpatient Medications   Medication Instructions    cholecalciferol (vitamin D3) 5,000 Units, Oral, Every 7 days    cyclobenzaprine (FLEXERIL) 7.5 mg, Oral, Daily PRN    hydrocortisone 2.5 % cream Topical (Top), 2 times daily    levothyroxine (SYNTHROID) 50 mcg, Oral, Before breakfast    liothyronine (CYTOMEL) 5 mcg, Oral, Daily    meloxicam (MOBIC) 7.5 MG tablet TAKE 1 TABLET BY MOUTH DAILY AS NEEDED FOR PAIN    multivitamin (THERAGRAN) per tablet 1 tablet, Oral    PREMPRO 0.3-1.5 mg per tablet 1 tablet, Oral, Daily    propranoloL (INDERAL) 20 MG tablet 1 tablet, Oral, 3 times daily    UNABLE TO FIND medication name: Mail order Iron Supplement - 2 nightly       Lipid Panel:   Lab Results   Component Value Date    CHOL 211 (H) 09/13/2022    HDL 85 (H) 09/13/2022    LDLCALC 110.8 09/13/2022    TRIG 76 09/13/2022    CHOLHDL 40.3 09/13/2022       The 10-year ASCVD risk score (Daryl DK, et al., 2019) is: 1.2%    Values used to calculate the score:      Age: 53 years      Sex: Female      Is Non- : No      Diabetic: No      Tobacco smoker: No      Systolic Blood Pressure: 132 mmHg      Is BP treated: No      HDL Cholesterol: 85 mg/dL      Total Cholesterol: 211 mg/dL    Most Recent EKG Results  Results for orders placed or performed in visit on 03/21/23   IN OFFICE EKG 12-LEAD (to [place])    Collection Time: 03/21/23  3:00 PM    Narrative    Test Reason : z00.0    Vent. Rate : 097 BPM     Atrial Rate : 097 BPM     P-R Int : 152 ms          QRS Dur : 092 ms      QT Int : 356 ms       P-R-T Axes : 058 010 048 degrees     QTc Int : 452 ms    Normal sinus rhythm  Right atrial enlargement  Borderline Abnormal ECG  When compared with ECG of 02-JUL-2020 15:20,  No significant change was found  Confirmed by Rahul BETHEA, Trevon SOMMERS ([id number]) on 3/22/2023 8:51:27 AM    Referred By: TAMMIE    SELF           Confirmed By:Trevon Kay MD       Most Recent Echocardiogram Results  Results for orders placed in visit on 04/06/23    Echo    Interpretation Summary  · The left ventricle is normal in size with normal systolic function.  · The estimated ejection fraction is 60%.  · Normal left ventricular diastolic function.  · Normal right ventricular size with normal right ventricular systolic function.  · Normal central venous pressure (3 mmHg).  · The estimated PA systolic pressure is 18 mmHg.      Most Recent Nuclear Stress Test Results  No results found for this or any previous visit.      Most Recent Cardiac PET Stress Test Results  No results found for this or any previous visit.      Most Recent Cardiovascular Angiogram results  No results found for this or any previous visit.      Other Most Recent Cardiology Results  Results for orders placed in visit on 04/06/23    CV Ultrasound Bilateral Doppler Carotid    Narrative  · Normal extracranial carotid artery system bilaterally with no significant plaque or stenosis on either side.  · Normal antegrade vertebral flow bilaterally.        All pertinent data including labs, imaging, EKGs, and studies listed above were reviewed.  Patient's most recent EKG tracing was personally interpreted by this provider.    Assessment:       1. Encounter for cardiac risk counseling    2. Parkinson's disease, unspecified whether dyskinesia present, unspecified whether manifestations fluctuate         Plan for treatment of the above diagnoses:     Symptoms OK today  BP/Pulse OK today  Most recent echocardiogram reviewed personally     Continue levothyroxine 50 mcg PO Daily  Continue propranolol 20 mg PO TID PRN  Lipid panel    Continue other cardiac medications  Mediterranean Diet/Cardiovascular Exercise Program    Patient queried and all questions were answered.    F/u in 1 year to reassess      Signed:    Trevon Kay MD  4/22/2024 9:58 AM

## 2024-04-22 ENCOUNTER — OFFICE VISIT (OUTPATIENT)
Dept: CARDIOLOGY | Facility: CLINIC | Age: 54
End: 2024-04-22
Payer: MEDICARE

## 2024-04-22 VITALS
DIASTOLIC BLOOD PRESSURE: 81 MMHG | SYSTOLIC BLOOD PRESSURE: 132 MMHG | WEIGHT: 131.19 LBS | HEIGHT: 62 IN | BODY MASS INDEX: 24.14 KG/M2 | HEART RATE: 78 BPM

## 2024-04-22 DIAGNOSIS — R79.9 ABNORMAL FINDING OF BLOOD CHEMISTRY, UNSPECIFIED: ICD-10-CM

## 2024-04-22 DIAGNOSIS — Z71.89 ENCOUNTER FOR CARDIAC RISK COUNSELING: Primary | ICD-10-CM

## 2024-04-22 DIAGNOSIS — G20.A1 PARKINSON'S DISEASE, UNSPECIFIED WHETHER DYSKINESIA PRESENT, UNSPECIFIED WHETHER MANIFESTATIONS FLUCTUATE: ICD-10-CM

## 2024-04-22 PROCEDURE — 99213 OFFICE O/P EST LOW 20 MIN: CPT | Mod: PBBFAC,PO | Performed by: INTERNAL MEDICINE

## 2024-04-22 PROCEDURE — 99999 PR PBB SHADOW E&M-EST. PATIENT-LVL III: CPT | Mod: PBBFAC,,, | Performed by: INTERNAL MEDICINE

## 2024-04-22 PROCEDURE — 99214 OFFICE O/P EST MOD 30 MIN: CPT | Mod: S$PBB,,, | Performed by: INTERNAL MEDICINE

## 2024-06-07 ENCOUNTER — PATIENT MESSAGE (OUTPATIENT)
Dept: ENDOCRINOLOGY | Facility: CLINIC | Age: 54
End: 2024-06-07
Payer: MEDICARE

## 2024-06-07 RX ORDER — LIOTHYRONINE SODIUM 5 UG/1
5 TABLET ORAL DAILY
Qty: 90 TABLET | Refills: 1 | Status: SHIPPED | OUTPATIENT
Start: 2024-06-07

## 2024-06-19 ENCOUNTER — PATIENT MESSAGE (OUTPATIENT)
Dept: CARDIOLOGY | Facility: CLINIC | Age: 54
End: 2024-06-19
Payer: MEDICARE

## 2024-07-08 ENCOUNTER — LAB VISIT (OUTPATIENT)
Dept: LAB | Facility: HOSPITAL | Age: 54
End: 2024-07-08
Attending: INTERNAL MEDICINE
Payer: MEDICARE

## 2024-07-08 ENCOUNTER — PATIENT MESSAGE (OUTPATIENT)
Dept: ENDOCRINOLOGY | Facility: CLINIC | Age: 54
End: 2024-07-08
Payer: MEDICARE

## 2024-07-08 DIAGNOSIS — E53.8 FOLATE DEFICIENCY: ICD-10-CM

## 2024-07-08 DIAGNOSIS — Z13.6 ENCOUNTER FOR LIPID SCREENING FOR CARDIOVASCULAR DISEASE: ICD-10-CM

## 2024-07-08 DIAGNOSIS — E03.8 HYPOTHYROIDISM DUE TO HASHIMOTO'S THYROIDITIS: ICD-10-CM

## 2024-07-08 DIAGNOSIS — Z79.899 ENCOUNTER FOR LONG-TERM (CURRENT) USE OF MEDICATIONS: ICD-10-CM

## 2024-07-08 DIAGNOSIS — Z13.220 ENCOUNTER FOR LIPID SCREENING FOR CARDIOVASCULAR DISEASE: ICD-10-CM

## 2024-07-08 DIAGNOSIS — Z00.00 WELLNESS EXAMINATION: ICD-10-CM

## 2024-07-08 DIAGNOSIS — E06.3 HYPOTHYROIDISM DUE TO HASHIMOTO'S THYROIDITIS: ICD-10-CM

## 2024-07-08 DIAGNOSIS — D64.9 ANEMIA, UNSPECIFIED TYPE: ICD-10-CM

## 2024-07-08 DIAGNOSIS — E03.9 HYPOTHYROIDISM, UNSPECIFIED TYPE: Primary | ICD-10-CM

## 2024-07-08 LAB
25(OH)D3+25(OH)D2 SERPL-MCNC: 59 NG/ML (ref 30–96)
ALBUMIN SERPL BCP-MCNC: 3.9 G/DL (ref 3.5–5.2)
ALP SERPL-CCNC: 38 U/L (ref 55–135)
ALT SERPL W/O P-5'-P-CCNC: 21 U/L (ref 10–44)
ANION GAP SERPL CALC-SCNC: 8 MMOL/L (ref 8–16)
AST SERPL-CCNC: 22 U/L (ref 10–40)
BASOPHILS # BLD AUTO: 0.02 K/UL (ref 0–0.2)
BASOPHILS NFR BLD: 0.6 % (ref 0–1.9)
BILIRUB SERPL-MCNC: 0.7 MG/DL (ref 0.1–1)
BUN SERPL-MCNC: 13 MG/DL (ref 6–20)
CALCIUM SERPL-MCNC: 9.6 MG/DL (ref 8.7–10.5)
CHLORIDE SERPL-SCNC: 106 MMOL/L (ref 95–110)
CHOLEST SERPL-MCNC: 216 MG/DL (ref 120–199)
CHOLEST/HDLC SERPL: 2.7 {RATIO} (ref 2–5)
CO2 SERPL-SCNC: 24 MMOL/L (ref 23–29)
CREAT SERPL-MCNC: 1 MG/DL (ref 0.5–1.4)
DIFFERENTIAL METHOD BLD: ABNORMAL
EOSINOPHIL # BLD AUTO: 0.1 K/UL (ref 0–0.5)
EOSINOPHIL NFR BLD: 3.9 % (ref 0–8)
ERYTHROCYTE [DISTWIDTH] IN BLOOD BY AUTOMATED COUNT: 13.4 % (ref 11.5–14.5)
EST. GFR  (NO RACE VARIABLE): >60 ML/MIN/1.73 M^2
FERRITIN SERPL-MCNC: 89 NG/ML (ref 20–300)
FOLATE SERPL-MCNC: 14.6 NG/ML (ref 4–24)
GLUCOSE SERPL-MCNC: 91 MG/DL (ref 70–110)
HCT VFR BLD AUTO: 37.6 % (ref 37–48.5)
HDLC SERPL-MCNC: 81 MG/DL (ref 40–75)
HDLC SERPL: 37.5 % (ref 20–50)
HGB BLD-MCNC: 12.1 G/DL (ref 12–16)
IMM GRANULOCYTES # BLD AUTO: 0.01 K/UL (ref 0–0.04)
IMM GRANULOCYTES NFR BLD AUTO: 0.3 % (ref 0–0.5)
IRON SERPL-MCNC: 114 UG/DL (ref 30–160)
LDLC SERPL CALC-MCNC: 117 MG/DL (ref 63–159)
LYMPHOCYTES # BLD AUTO: 1.5 K/UL (ref 1–4.8)
LYMPHOCYTES NFR BLD: 43.9 % (ref 18–48)
MCH RBC QN AUTO: 31.4 PG (ref 27–31)
MCHC RBC AUTO-ENTMCNC: 32.2 G/DL (ref 32–36)
MCV RBC AUTO: 98 FL (ref 82–98)
MONOCYTES # BLD AUTO: 0.2 K/UL (ref 0.3–1)
MONOCYTES NFR BLD: 6.1 % (ref 4–15)
NEUTROPHILS # BLD AUTO: 1.5 K/UL (ref 1.8–7.7)
NEUTROPHILS NFR BLD: 45.2 % (ref 38–73)
NONHDLC SERPL-MCNC: 135 MG/DL
NRBC BLD-RTO: 0 /100 WBC
PLATELET # BLD AUTO: 230 K/UL (ref 150–450)
PMV BLD AUTO: 9.7 FL (ref 9.2–12.9)
POTASSIUM SERPL-SCNC: 3.9 MMOL/L (ref 3.5–5.1)
PROT SERPL-MCNC: 7 G/DL (ref 6–8.4)
RBC # BLD AUTO: 3.85 M/UL (ref 4–5.4)
SATURATED IRON: 34 % (ref 20–50)
SODIUM SERPL-SCNC: 138 MMOL/L (ref 136–145)
T4 FREE SERPL-MCNC: 0.72 NG/DL (ref 0.71–1.51)
TOTAL IRON BINDING CAPACITY: 336 UG/DL (ref 250–450)
TRANSFERRIN SERPL-MCNC: 227 MG/DL (ref 200–375)
TRIGL SERPL-MCNC: 90 MG/DL (ref 30–150)
TSH SERPL DL<=0.005 MIU/L-ACNC: 9.98 UIU/ML (ref 0.4–4)
WBC # BLD AUTO: 3.3 K/UL (ref 3.9–12.7)

## 2024-07-08 PROCEDURE — 80053 COMPREHEN METABOLIC PANEL: CPT | Performed by: INTERNAL MEDICINE

## 2024-07-08 PROCEDURE — 84443 ASSAY THYROID STIM HORMONE: CPT | Performed by: INTERNAL MEDICINE

## 2024-07-08 PROCEDURE — 83540 ASSAY OF IRON: CPT | Performed by: INTERNAL MEDICINE

## 2024-07-08 PROCEDURE — 82728 ASSAY OF FERRITIN: CPT | Performed by: INTERNAL MEDICINE

## 2024-07-08 PROCEDURE — 80061 LIPID PANEL: CPT | Performed by: INTERNAL MEDICINE

## 2024-07-08 PROCEDURE — 85025 COMPLETE CBC W/AUTO DIFF WBC: CPT | Performed by: INTERNAL MEDICINE

## 2024-07-08 PROCEDURE — 82746 ASSAY OF FOLIC ACID SERUM: CPT | Performed by: INTERNAL MEDICINE

## 2024-07-08 PROCEDURE — 84439 ASSAY OF FREE THYROXINE: CPT | Performed by: INTERNAL MEDICINE

## 2024-07-08 PROCEDURE — 82306 VITAMIN D 25 HYDROXY: CPT | Performed by: INTERNAL MEDICINE

## 2024-07-08 PROCEDURE — 36415 COLL VENOUS BLD VENIPUNCTURE: CPT | Mod: PO | Performed by: INTERNAL MEDICINE

## 2024-07-09 RX ORDER — LEVOTHYROXINE SODIUM 75 UG/1
75 TABLET ORAL
Qty: 30 TABLET | Refills: 11 | Status: SHIPPED | OUTPATIENT
Start: 2024-07-09 | End: 2024-07-10 | Stop reason: SDUPTHER

## 2024-07-09 RX ORDER — LEVOTHYROXINE SODIUM 75 UG/1
75 TABLET ORAL
Qty: 30 TABLET | Refills: 11 | Status: SHIPPED | OUTPATIENT
Start: 2024-07-09 | End: 2024-07-09 | Stop reason: SDUPTHER

## 2024-07-10 RX ORDER — LEVOTHYROXINE SODIUM 75 UG/1
75 TABLET ORAL
Qty: 90 TABLET | Refills: 1 | Status: SHIPPED | OUTPATIENT
Start: 2024-07-10 | End: 2024-07-10 | Stop reason: SDUPTHER

## 2024-07-10 RX ORDER — LEVOTHYROXINE SODIUM 75 UG/1
75 TABLET ORAL
Qty: 90 TABLET | Refills: 1 | Status: SHIPPED | OUTPATIENT
Start: 2024-07-10 | End: 2025-07-10

## 2024-08-20 ENCOUNTER — LAB VISIT (OUTPATIENT)
Dept: LAB | Facility: HOSPITAL | Age: 54
End: 2024-08-20
Attending: INTERNAL MEDICINE
Payer: MEDICARE

## 2024-08-20 DIAGNOSIS — E03.9 HYPOTHYROIDISM, UNSPECIFIED TYPE: ICD-10-CM

## 2024-08-20 PROCEDURE — 36415 COLL VENOUS BLD VENIPUNCTURE: CPT | Mod: PO | Performed by: INTERNAL MEDICINE

## 2024-08-20 PROCEDURE — 84443 ASSAY THYROID STIM HORMONE: CPT | Performed by: INTERNAL MEDICINE

## 2024-08-20 PROCEDURE — 84439 ASSAY OF FREE THYROXINE: CPT | Performed by: INTERNAL MEDICINE

## 2024-08-21 LAB
T4 FREE SERPL-MCNC: 1 NG/DL (ref 0.71–1.51)
TSH SERPL DL<=0.005 MIU/L-ACNC: 0.32 UIU/ML (ref 0.4–4)

## 2024-08-30 ENCOUNTER — PATIENT MESSAGE (OUTPATIENT)
Dept: ENDOCRINOLOGY | Facility: CLINIC | Age: 54
End: 2024-08-30

## 2024-08-30 ENCOUNTER — TELEPHONE (OUTPATIENT)
Dept: ENDOCRINOLOGY | Facility: CLINIC | Age: 54
End: 2024-08-30

## 2024-08-30 ENCOUNTER — E-VISIT (OUTPATIENT)
Dept: ENDOCRINOLOGY | Facility: CLINIC | Age: 54
End: 2024-08-30
Payer: MEDICARE

## 2024-08-30 DIAGNOSIS — E03.9 HYPOTHYROIDISM, UNSPECIFIED TYPE: Primary | ICD-10-CM

## 2024-08-30 RX ORDER — LIOTHYRONINE SODIUM 5 UG/1
5 TABLET ORAL DAILY
Qty: 90 TABLET | Refills: 1 | Status: SHIPPED | OUTPATIENT
Start: 2024-08-30

## 2024-08-30 RX ORDER — LEVOTHYROXINE SODIUM 75 UG/1
75 TABLET ORAL
Qty: 90 TABLET | Refills: 1 | Status: SHIPPED | OUTPATIENT
Start: 2024-08-30 | End: 2025-08-30

## 2024-08-30 NOTE — TELEPHONE ENCOUNTER
Let patient know I sent an evisit to her mychart.   Can place on there if taking thyroid medication by itself and to verify dose she is taking

## 2024-08-30 NOTE — PROGRESS NOTES
Chart reviewed  TSH mildly suppressed. No hyperthyroid symptoms  Ok to continue current dose.   Refills sent to pharmacy  Monitor for hyperthyroid symptoms  5 min spent on evisit

## 2024-09-03 RX ORDER — LIOTHYRONINE SODIUM 5 UG/1
5 TABLET ORAL DAILY
Qty: 90 TABLET | Refills: 3 | Status: SHIPPED | OUTPATIENT
Start: 2024-09-03 | End: 2024-09-03 | Stop reason: SDUPTHER

## 2024-09-03 RX ORDER — LEVOTHYROXINE SODIUM 75 UG/1
75 TABLET ORAL
Qty: 90 TABLET | Refills: 3 | Status: SHIPPED | OUTPATIENT
Start: 2024-09-03 | End: 2024-09-04 | Stop reason: SDUPTHER

## 2024-09-04 RX ORDER — LEVOTHYROXINE SODIUM 75 UG/1
75 TABLET ORAL
Qty: 90 TABLET | Refills: 3 | Status: SHIPPED | OUTPATIENT
Start: 2024-09-04 | End: 2024-09-04

## 2024-09-04 RX ORDER — LEVOTHYROXINE SODIUM 75 UG/1
75 TABLET ORAL
Qty: 90 TABLET | Refills: 1 | Status: SHIPPED | OUTPATIENT
Start: 2024-09-04 | End: 2024-09-04 | Stop reason: SDUPTHER

## 2024-09-04 RX ORDER — LIOTHYRONINE SODIUM 5 UG/1
5 TABLET ORAL DAILY
Qty: 90 TABLET | Refills: 3 | Status: SHIPPED | OUTPATIENT
Start: 2024-09-04

## 2024-09-04 RX ORDER — LIOTHYRONINE SODIUM 5 UG/1
5 TABLET ORAL DAILY
Qty: 90 TABLET | Refills: 3 | Status: SHIPPED | OUTPATIENT
Start: 2024-09-04 | End: 2024-09-04

## 2024-09-04 RX ORDER — LEVOTHYROXINE SODIUM 75 UG/1
75 TABLET ORAL
Qty: 90 TABLET | Refills: 3 | Status: SHIPPED | OUTPATIENT
Start: 2024-09-04 | End: 2025-09-04

## 2024-09-04 RX ORDER — LIOTHYRONINE SODIUM 5 UG/1
5 TABLET ORAL DAILY
Qty: 90 TABLET | Refills: 3 | Status: SHIPPED | OUTPATIENT
Start: 2024-09-04 | End: 2024-09-04 | Stop reason: SDUPTHER

## 2024-09-05 ENCOUNTER — TELEPHONE (OUTPATIENT)
Dept: ENDOCRINOLOGY | Facility: CLINIC | Age: 54
End: 2024-09-05
Payer: MEDICARE

## 2024-11-22 ENCOUNTER — PATIENT MESSAGE (OUTPATIENT)
Dept: ENDOCRINOLOGY | Facility: CLINIC | Age: 54
End: 2024-11-22
Payer: MEDICARE

## 2024-11-22 DIAGNOSIS — E03.9 HYPOTHYROIDISM, UNSPECIFIED TYPE: Primary | ICD-10-CM

## 2024-11-27 ENCOUNTER — LAB VISIT (OUTPATIENT)
Dept: LAB | Facility: HOSPITAL | Age: 54
End: 2024-11-27
Attending: INTERNAL MEDICINE
Payer: MEDICARE

## 2024-11-27 DIAGNOSIS — E03.9 HYPOTHYROIDISM, UNSPECIFIED TYPE: ICD-10-CM

## 2024-11-27 LAB
T3 SERPL-MCNC: 80 NG/DL (ref 60–180)
T4 FREE SERPL-MCNC: 0.95 NG/DL (ref 0.71–1.51)
TSH SERPL DL<=0.005 MIU/L-ACNC: 0.16 UIU/ML (ref 0.4–4)

## 2024-11-27 PROCEDURE — 84480 ASSAY TRIIODOTHYRONINE (T3): CPT | Performed by: INTERNAL MEDICINE

## 2024-11-27 PROCEDURE — 84443 ASSAY THYROID STIM HORMONE: CPT | Performed by: INTERNAL MEDICINE

## 2024-11-27 PROCEDURE — 36415 COLL VENOUS BLD VENIPUNCTURE: CPT | Mod: PO | Performed by: INTERNAL MEDICINE

## 2024-11-27 PROCEDURE — 84439 ASSAY OF FREE THYROXINE: CPT | Performed by: INTERNAL MEDICINE

## 2024-12-01 RX ORDER — LIOTHYRONINE SODIUM 5 UG/1
5 TABLET ORAL
Qty: 90 TABLET | Refills: 1 | Status: SHIPPED | OUTPATIENT
Start: 2024-12-01

## 2024-12-06 ENCOUNTER — OFFICE VISIT (OUTPATIENT)
Dept: ENDOCRINOLOGY | Facility: CLINIC | Age: 54
End: 2024-12-06
Payer: MEDICARE

## 2024-12-06 DIAGNOSIS — E03.9 HYPOTHYROIDISM, UNSPECIFIED TYPE: Primary | ICD-10-CM

## 2024-12-06 PROCEDURE — 99213 OFFICE O/P EST LOW 20 MIN: CPT | Mod: 95,,, | Performed by: INTERNAL MEDICINE

## 2024-12-06 RX ORDER — LEVOTHYROXINE SODIUM 75 UG/1
TABLET ORAL
Qty: 90 TABLET | Refills: 3 | Status: SHIPPED | OUTPATIENT
Start: 2024-12-06

## 2024-12-06 NOTE — PROGRESS NOTES
The patient location is: [place]    Visit type: audiovisual    Face to Face time with patient: 6  8 minutes of total time spent on the encounter, which includes face to face time and non-face to face time preparing to see the patient (eg, review of tests), Obtaining and/or reviewing separately obtained history, Documenting clinical information in the electronic or other health record, Independently interpreting results (not separately reported) and communicating results to the patient/family/caregiver, or Care coordination (not separately reported).         Each patient to whom he or she provides medical services by telemedicine is:  (1) informed of the relationship between the physician and patient and the respective role of any other health care provider with respect to management of the patient; and (2) notified that he or she may decline to receive medical services by telemedicine and may withdraw from such care at any time.    Notes:      CHIEF COMPLAINT: Hypothyroid  54 y.o.  being seen as a f/u. Hypothyroidism x 25 years. On synthroid 75 mcg and cytomel 5 mcg daily. States could not tolerate the 50 mcg of synthroid. Felt sluggish. No palpitations. No tremors. NO significant fatigue. No CP. No SOB.           PAST MEDICAL HISTORY/PAST SURGICAL HISTORY:  Reviewed in [employer]    SOCIAL HISTORY: No Tobacco. Social alcohol.     FAMILY HISTORY:  Sister hypothyroid. No DM 2. Parkinsons.     MEDICATIONS/ALLERGIES: The patient's MedCard has been updated and reviewed.          PE:  Virtual Visit                ASSESSMENT/PLAN:  1. Hypothyroidism- change to Synthroid 75 mcg 6 days a week.  Check TFTs in 6 weeks.    FOLLOWUP  Fax Prescription to [phone]  Needs TSH, Ft4, T3- 6 weeks  F/U 1 year- TSH, Ft4, T3.

## 2025-01-17 ENCOUNTER — LAB VISIT (OUTPATIENT)
Dept: LAB | Facility: HOSPITAL | Age: 55
End: 2025-01-17
Attending: INTERNAL MEDICINE
Payer: MEDICARE

## 2025-01-17 DIAGNOSIS — E03.9 HYPOTHYROIDISM, UNSPECIFIED TYPE: ICD-10-CM

## 2025-01-17 LAB
T3 SERPL-MCNC: 77 NG/DL (ref 60–180)
T4 FREE SERPL-MCNC: 0.81 NG/DL (ref 0.71–1.51)
TSH SERPL DL<=0.005 MIU/L-ACNC: 0.7 UIU/ML (ref 0.4–4)

## 2025-01-17 PROCEDURE — 84439 ASSAY OF FREE THYROXINE: CPT | Performed by: INTERNAL MEDICINE

## 2025-01-17 PROCEDURE — 36415 COLL VENOUS BLD VENIPUNCTURE: CPT | Mod: PO | Performed by: INTERNAL MEDICINE

## 2025-01-17 PROCEDURE — 84480 ASSAY TRIIODOTHYRONINE (T3): CPT | Performed by: INTERNAL MEDICINE

## 2025-01-17 PROCEDURE — 84443 ASSAY THYROID STIM HORMONE: CPT | Performed by: INTERNAL MEDICINE

## 2025-01-18 ENCOUNTER — PATIENT MESSAGE (OUTPATIENT)
Dept: ENDOCRINOLOGY | Facility: CLINIC | Age: 55
End: 2025-01-18
Payer: MEDICARE

## 2025-02-03 NOTE — PROGRESS NOTES
"Subjective:    Patient ID:  Nathalie Rodriguez is a 54 y.o. female who presents for follow-up of clearance      Problem List Items Addressed This Visit    None  Visit Diagnoses       Pre-operative cardiovascular examination    -  Primary    Relevant Orders    IN OFFICE EKG 12-LEAD (to [place])    Echo    Encounter for cardiac risk counseling        Relevant Orders    IN OFFICE EKG 12-LEAD (to [place])    Echo    Encounter for examination of blood pressure without abnormal findings        Relevant Orders    Echo              Patient was last seen on 04/22/2024 at which time she was doing okay from a cardiac standpoint.  Presents today for preoperative cardiovascular assessment prior to ptosis repair, bilateral with general anesthesia    History of Present Illness    CHIEF COMPLAINT:  Ms. Rodriguez presents today for preoperative cardiovascular assessment    CARDIOVASCULAR:  She denies chest pain, shortness of breath, and palpitations.    SURGICAL HISTORY:  She has history of brain surgery. She reports tolerating propofol well during a previous GI procedure but experienced severe post-operative nausea and vomiting with heavy anesthesia during brain surgery.    EXERCISE:  She exercises 5-6 days per week, including weight training with her , high intensity interval training, dance, and frequent dog walking.    Parts of this note were transcribed using voice recognition and generative artificial intelligence software ([employer] and [employer]).  Please excuse any grammatical or syntax errors and reach out to me with any questions or clarifications needed.         METs > 4    Objective:     Vitals:    02/10/25 0842   BP: 120/73   BP Location: Right arm   Patient Position: Sitting   Pulse: 87   Weight: 59.4 kg (130 lb 15.3 oz)   Height: 5' 3" (1.6 m)       BP Readings from Last 5 Encounters:   02/10/25 120/73   11/06/24 108/66   07/11/24 102/68   04/22/24 132/81   01/29/24 110/78        Physical Exam  Vitals and nursing note " reviewed.   Constitutional:       General: She is not in acute distress.     Appearance: She is well-developed.   HENT:      Head: Normocephalic and atraumatic.   Neck:      Vascular: No JVD.   Cardiovascular:      Rate and Rhythm: Normal rate and regular rhythm.      Heart sounds: Normal heart sounds. No murmur heard.     No friction rub. No gallop.   Pulmonary:      Effort: Pulmonary effort is normal. No respiratory distress.      Breath sounds: Normal breath sounds. No wheezing or rales.   Abdominal:      General: Bowel sounds are normal.      Palpations: Abdomen is soft.      Tenderness: There is no abdominal tenderness. There is no guarding or rebound.   Musculoskeletal:         General: No tenderness.      Cervical back: Neck supple.   Skin:     General: Skin is warm and dry.   Neurological:      Mental Status: She is alert and oriented to person, place, and time.   Psychiatric:         Behavior: Behavior normal.             Current Outpatient Medications   Medication Instructions    buPROPion (WELLBUTRIN XL) 150 mg, Daily    cholecalciferol (vitamin D3) 5,000 Units, Every 7 days    clobetasoL (TEMOVATE) 0.05 % cream Topical (Top), 2 times daily, Apply to affected areas of skin on body/scalp until improved, then can stop and restart as needed. Avoid face, under arms, and genitals.    cyclobenzaprine (FLEXERIL) 7.5 mg, Oral, Daily PRN    hydrocortisone 2.5 % cream Topical (Top), 2 times daily    liothyronine (CYTOMEL) 5 mcg, Oral    meloxicam (MOBIC) 7.5 mg, Oral, Daily PRN    multivitamin (THERAGRAN) per tablet 1 tablet    propranoloL (INDERAL) 10 mg    SYNTHROID 75 mcg tablet 1 tablet 6 days a week.    UNABLE TO FIND medication name: Mail order Iron Supplement - 2 nightly       Lipid Panel:   Lab Results   Component Value Date    CHOL 216 (H) 07/08/2024    HDL 81 (H) 07/08/2024    LDLCALC 117.0 07/08/2024    TRIG 90 07/08/2024    CHOLHDL 37.5 07/08/2024       The 10-year ASCVD risk score (Daryl RM, et al.,  2019) is: 1.2%    Values used to calculate the score:      Age: 54 years      Sex: Female      Is Non- : No      Diabetic: No      Tobacco smoker: No      Systolic Blood Pressure: 120 mmHg      Is BP treated: No      HDL Cholesterol: 81 mg/dL      Total Cholesterol: 216 mg/dL    Most Recent EKG Results  Results for orders placed or performed in visit on 03/21/23   IN OFFICE EKG 12-LEAD (to [place])    Collection Time: 03/21/23  3:00 PM    Narrative    Test Reason : z00.0    Vent. Rate : 097 BPM     Atrial Rate : 097 BPM     P-R Int : 152 ms          QRS Dur : 092 ms      QT Int : 356 ms       P-R-T Axes : 058 010 048 degrees     QTc Int : 452 ms    Normal sinus rhythm  Right atrial enlargement  Borderline Abnormal ECG  When compared with ECG of 02-JUL-2020 15:20,  No significant change was found  Confirmed by Rahul BETHEA, Trevon SOMMERS ([id number]) on 3/22/2023 8:51:27 AM    Referred By: TAMMIE   SELF           Confirmed By:Trevon Kay MD       Most Recent Echocardiogram Results  Results for orders placed in visit on 04/06/23    Echo    Interpretation Summary  · The left ventricle is normal in size with normal systolic function.  · The estimated ejection fraction is 60%.  · Normal left ventricular diastolic function.  · Normal right ventricular size with normal right ventricular systolic function.  · Normal central venous pressure (3 mmHg).  · The estimated PA systolic pressure is 18 mmHg.      Most Recent Nuclear Stress Test Results  No results found for this or any previous visit.      Most Recent Cardiac PET Stress Test Results  No results found for this or any previous visit.      Most Recent Cardiovascular Angiogram results  No results found for this or any previous visit.      Other Most Recent Cardiology Results  Results for orders placed in visit on 04/06/23    CV Ultrasound Bilateral Doppler Carotid    Interpretation Summary  · Normal extracranial carotid artery system bilaterally with  no significant plaque or stenosis on either side.  · Normal antegrade vertebral flow bilaterally.        All pertinent data including labs, imaging, EKGs, and studies listed above were reviewed.  Patient's most recent EKG tracing was personally interpreted by this provider.    Diagnoses:       1. Pre-operative cardiovascular examination    2. Encounter for cardiac risk counseling    3. Encounter for examination of blood pressure without abnormal findings         Plan for treatment of the above diagnoses:     Assessment & Plan    Assessed patient's cardiac risk for upcoming surgery  Reviewed EKG, noting normal sinus rhythm with artifact from nerve stimulator  Evaluated patient's exercise capacity, determining >4 METs  Recommend repeat cardiac ultrasound before surgery for final clearance      PLAN SUMMARY:  Ordered repeat cardiac ultrasound  Follow up after cardiac ultrasound for final surgical clearance  Explained anesthesia risk in terms of metabolic equivalents (METs)    PLAN NOTE:  Explained anesthesia risk in terms of metabolic equivalents (METs).  Ordered repeat cardiac ultrasound.      METs > 4  No indication for stress testing     Echocardiogram   Continue propranolol    Continue other cardiac medications  Mediterranean Diet/Cardiovascular Exercise Program    As long as the above studies are without acute issues, with the above recommendations, the patient is optimized for the above mentioned procedure.    Visit today included increased complexity associated with the care of the episodic problem(s) addressed above in addition to managing the longitudinal care of the patient due to the serious and/or complex managed problem(s) listed above.    Parts of this note were transcribed using voice recognition and generative artificial intelligence software ([employer]).  Please excuse any grammatical or syntax errors and reach out to me with any questions or clarifications needed.    Patient queried and  all questions were answered.    F/u in 1 year to reassess      Signed:    Trevon Kay MD  2/10/2025 9:14 AM

## 2025-02-10 ENCOUNTER — OFFICE VISIT (OUTPATIENT)
Dept: CARDIOLOGY | Facility: CLINIC | Age: 55
End: 2025-02-10
Payer: MEDICARE

## 2025-02-10 VITALS
HEIGHT: 63 IN | HEART RATE: 87 BPM | DIASTOLIC BLOOD PRESSURE: 73 MMHG | BODY MASS INDEX: 23.2 KG/M2 | SYSTOLIC BLOOD PRESSURE: 120 MMHG | WEIGHT: 130.94 LBS

## 2025-02-10 DIAGNOSIS — Z01.810 PRE-OPERATIVE CARDIOVASCULAR EXAMINATION: Primary | ICD-10-CM

## 2025-02-10 DIAGNOSIS — Z01.30 ENCOUNTER FOR EXAMINATION OF BLOOD PRESSURE WITHOUT ABNORMAL FINDINGS: ICD-10-CM

## 2025-02-10 DIAGNOSIS — Z71.89 ENCOUNTER FOR CARDIAC RISK COUNSELING: ICD-10-CM

## 2025-02-10 PROCEDURE — 99214 OFFICE O/P EST MOD 30 MIN: CPT | Mod: S$PBB,,, | Performed by: INTERNAL MEDICINE

## 2025-02-10 PROCEDURE — G2211 COMPLEX E/M VISIT ADD ON: HCPCS | Mod: S$PBB,,, | Performed by: INTERNAL MEDICINE

## 2025-02-10 PROCEDURE — 93010 ELECTROCARDIOGRAM REPORT: CPT | Mod: ,,, | Performed by: INTERNAL MEDICINE

## 2025-02-10 PROCEDURE — 99213 OFFICE O/P EST LOW 20 MIN: CPT | Mod: PBBFAC,25,PO | Performed by: INTERNAL MEDICINE

## 2025-02-10 PROCEDURE — 93005 ELECTROCARDIOGRAM TRACING: CPT | Mod: PO

## 2025-02-10 PROCEDURE — 99999 PR PBB SHADOW E&M-EST. PATIENT-LVL III: CPT | Mod: PBBFAC,,, | Performed by: INTERNAL MEDICINE

## 2025-02-12 LAB
OHS QRS DURATION: 180 MS
OHS QTC CALCULATION: 358 MS

## 2025-02-24 ENCOUNTER — RESULTS FOLLOW-UP (OUTPATIENT)
Dept: CARDIOLOGY | Facility: CLINIC | Age: 55
End: 2025-02-24

## 2025-02-24 ENCOUNTER — HOSPITAL ENCOUNTER (OUTPATIENT)
Dept: CARDIOLOGY | Facility: HOSPITAL | Age: 55
Discharge: HOME | End: 2025-02-24
Attending: INTERNAL MEDICINE
Payer: MEDICARE

## 2025-02-24 VITALS — WEIGHT: 130 LBS | BODY MASS INDEX: 23.04 KG/M2 | HEIGHT: 63 IN

## 2025-02-24 DIAGNOSIS — Z01.810 PRE-OPERATIVE CARDIOVASCULAR EXAMINATION: ICD-10-CM

## 2025-02-24 DIAGNOSIS — Z71.89 ENCOUNTER FOR CARDIAC RISK COUNSELING: ICD-10-CM

## 2025-02-24 DIAGNOSIS — Z01.30 ENCOUNTER FOR EXAMINATION OF BLOOD PRESSURE WITHOUT ABNORMAL FINDINGS: ICD-10-CM

## 2025-02-24 LAB
ASCENDING AORTA: 2.72 CM
AV INDEX (PROSTH): 0.69
AV MEAN GRADIENT: 4 MMHG
AV PEAK GRADIENT: 7 MMHG
AV VALVE AREA BY VELOCITY RATIO: 2.4 CM²
AV VALVE AREA: 2.4 CM²
AV VELOCITY RATIO: 0.69
BSA FOR ECHO PROCEDURE: 1.62 M2
CV ECHO LV RWT: 0.38 CM
DOP CALC AO PEAK VEL: 1.3 M/S
DOP CALC AO VTI: 29.6 CM
DOP CALC LVOT AREA: 3.5 CM2
DOP CALC LVOT DIAMETER: 2.1 CM
DOP CALC LVOT PEAK VEL: 0.9 M/S
DOP CALC LVOT STROKE VOLUME: 70.6 CM3
DOP CALCLVOT PEAK VEL VTI: 20.4 CM
E WAVE DECELERATION TIME: 280 MSEC
E/A RATIO: 1.09
E/E' RATIO: 5 M/S
ECHO LV POSTERIOR WALL: 0.8 CM (ref 0.6–1.1)
EJECTION FRACTION: 60 %
FRACTIONAL SHORTENING: 38.1 % (ref 28–44)
INTERVENTRICULAR SEPTUM: 0.8 CM (ref 0.6–1.1)
LEFT ATRIUM AREA SYSTOLIC (APICAL 2 CHAMBER): 16.3 CM2
LEFT ATRIUM AREA SYSTOLIC (APICAL 4 CHAMBER): 16.45 CM2
LEFT ATRIUM SIZE: 2.9 CM
LEFT ATRIUM VOLUME INDEX MOD: 27 ML/M2
LEFT ATRIUM VOLUME MOD: 43 ML
LEFT INTERNAL DIMENSION IN SYSTOLE: 2.6 CM (ref 2.1–4)
LEFT VENTRICLE DIASTOLIC VOLUME INDEX: 49.28 ML/M2
LEFT VENTRICLE DIASTOLIC VOLUME: 79.34 ML
LEFT VENTRICLE END SYSTOLIC VOLUME APICAL 2 CHAMBER: 45.56 ML
LEFT VENTRICLE END SYSTOLIC VOLUME APICAL 4 CHAMBER: 37.6 ML
LEFT VENTRICLE MASS INDEX: 62.9 G/M2
LEFT VENTRICLE SYSTOLIC VOLUME INDEX: 15.3 ML/M2
LEFT VENTRICLE SYSTOLIC VOLUME: 24.69 ML
LEFT VENTRICULAR INTERNAL DIMENSION IN DIASTOLE: 4.2 CM (ref 3.5–6)
LEFT VENTRICULAR MASS: 101.3 G
LV LATERAL E/E' RATIO: 4.4 M/S
LV SEPTAL E/E' RATIO: 5.2 M/S
LVED V (TEICH): 79.34 ML
LVES V (TEICH): 24.69 ML
LVOT MG: 2.05 MMHG
LVOT MV: 0.68 CM/S
MV PEAK A VEL: 0.57 M/S
MV PEAK E VEL: 0.62 M/S
OHS CV RV/LV RATIO: 0.76 CM
PISA TR MAX VEL: 1.9 M/S
PULM VEIN S/D RATIO: 0.98
PV PEAK D VEL: 0.52 M/S
PV PEAK S VEL: 0.51 M/S
RA PRESSURE ESTIMATED: 3 MMHG
RA VOL SYS: 21.7 ML
RIGHT ATRIAL AREA: 10.4 CM2
RIGHT ATRIUM VOLUME AREA LENGTH APICAL 4 CHAMBER: 19.94 ML
RIGHT VENTRICLE DIASTOLIC BASEL DIMENSION: 3.2 CM
RIGHT VENTRICLE DIASTOLIC LENGTH: 6.9 CM
RIGHT VENTRICLE DIASTOLIC MID DIMENSION: 2.1 CM
RIGHT VENTRICULAR END-DIASTOLIC DIMENSION: 3.23 CM
RIGHT VENTRICULAR LENGTH IN DIASTOLE (APICAL 4-CHAMBER VIEW): 6.89 CM
RV MID DIAMA: 2.07 CM
RV TB RVSP: 5 MMHG
RV TISSUE DOPPLER FREE WALL SYSTOLIC VELOCITY 1 (APICAL 4 CHAMBER VIEW): 13.49 CM/S
SINUS: 2.64 CM
STJ: 2.66 CM
TDI LATERAL: 0.14 M/S
TDI SEPTAL: 0.12 M/S
TDI: 0.13 M/S
TR MAX PG: 14 MMHG
TRICUSPID ANNULAR PLANE SYSTOLIC EXCURSION: 2.68 CM
TV REST PULMONARY ARTERY PRESSURE: 17 MMHG
Z-SCORE OF LEFT VENTRICULAR DIMENSION IN END DIASTOLE: -0.84
Z-SCORE OF LEFT VENTRICULAR DIMENSION IN END SYSTOLE: -0.68

## 2025-02-24 PROCEDURE — 93306 TTE W/DOPPLER COMPLETE: CPT | Mod: 26,,, | Performed by: INTERNAL MEDICINE

## 2025-02-24 PROCEDURE — 93306 TTE W/DOPPLER COMPLETE: CPT | Mod: PO

## 2025-02-24 NOTE — TELEPHONE ENCOUNTER
----- Message from Trevon Kay MD sent at 2/24/2025  4:31 PM CST -----  Echo looks ok.  ----- Message -----  From: Trevon Kay MD  Sent: 2/24/2025   3:11 PM CST  To: Trevon Kay MD

## 2025-05-22 ENCOUNTER — PATIENT MESSAGE (OUTPATIENT)
Dept: OBSTETRICS AND GYNECOLOGY | Facility: CLINIC | Age: 55
End: 2025-05-22
Payer: MEDICARE

## 2025-05-28 DIAGNOSIS — E03.9 HYPOTHYROIDISM, UNSPECIFIED TYPE: Primary | ICD-10-CM

## 2025-05-28 RX ORDER — LIOTHYRONINE SODIUM 5 UG/1
5 TABLET ORAL
Qty: 90 TABLET | Refills: 1 | Status: SHIPPED | OUTPATIENT
Start: 2025-05-28

## 2025-08-05 ENCOUNTER — LAB VISIT (OUTPATIENT)
Dept: LAB | Facility: HOSPITAL | Age: 55
End: 2025-08-05
Attending: INTERNAL MEDICINE
Payer: MEDICARE

## 2025-08-05 DIAGNOSIS — E55.9 VITAMIN D DEFICIENCY: ICD-10-CM

## 2025-08-05 DIAGNOSIS — Z00.00 WELLNESS EXAMINATION: ICD-10-CM

## 2025-08-05 DIAGNOSIS — E06.3 HYPOTHYROIDISM DUE TO HASHIMOTO'S THYROIDITIS: ICD-10-CM

## 2025-08-05 LAB
25(OH)D3+25(OH)D2 SERPL-MCNC: 50 NG/ML (ref 30–96)
ABSOLUTE EOSINOPHIL (OHS): 0.2 K/UL
ABSOLUTE MONOCYTE (OHS): 0.21 K/UL (ref 0.3–1)
ABSOLUTE NEUTROPHIL COUNT (OHS): 1.67 K/UL (ref 1.8–7.7)
ALBUMIN SERPL BCP-MCNC: 3.8 G/DL (ref 3.5–5.2)
ALP SERPL-CCNC: 45 UNIT/L (ref 40–150)
ALT SERPL W/O P-5'-P-CCNC: 19 UNIT/L (ref 0–55)
ANION GAP (OHS): 8 MMOL/L (ref 8–16)
AST SERPL-CCNC: 23 UNIT/L (ref 0–50)
BASOPHILS # BLD AUTO: 0.03 K/UL
BASOPHILS NFR BLD AUTO: 0.9 %
BILIRUB SERPL-MCNC: 0.6 MG/DL (ref 0.1–1)
BUN SERPL-MCNC: 15 MG/DL (ref 6–20)
CALCIUM SERPL-MCNC: 8.5 MG/DL (ref 8.7–10.5)
CHLORIDE SERPL-SCNC: 106 MMOL/L (ref 95–110)
CHOLEST SERPL-MCNC: 201 MG/DL (ref 120–199)
CHOLEST/HDLC SERPL: 2.5 {RATIO} (ref 2–5)
CO2 SERPL-SCNC: 25 MMOL/L (ref 23–29)
CREAT SERPL-MCNC: 1 MG/DL (ref 0.5–1.4)
ERYTHROCYTE [DISTWIDTH] IN BLOOD BY AUTOMATED COUNT: 13.3 % (ref 11.5–14.5)
GFR SERPLBLD CREATININE-BSD FMLA CKD-EPI: >60 ML/MIN/1.73/M2
GLUCOSE SERPL-MCNC: 90 MG/DL (ref 70–110)
HCT VFR BLD AUTO: 37 % (ref 37–48.5)
HDLC SERPL-MCNC: 82 MG/DL (ref 40–75)
HDLC SERPL: 40.8 % (ref 20–50)
HGB BLD-MCNC: 11.9 GM/DL (ref 12–16)
IMM GRANULOCYTES # BLD AUTO: 0 K/UL (ref 0–0.04)
IMM GRANULOCYTES NFR BLD AUTO: 0 % (ref 0–0.5)
LDLC SERPL CALC-MCNC: 104.6 MG/DL (ref 63–159)
LYMPHOCYTES # BLD AUTO: 1.32 K/UL (ref 1–4.8)
MCH RBC QN AUTO: 31.9 PG (ref 27–31)
MCHC RBC AUTO-ENTMCNC: 32.2 G/DL (ref 32–36)
MCV RBC AUTO: 99 FL (ref 82–98)
NONHDLC SERPL-MCNC: 119 MG/DL
NUCLEATED RBC (/100WBC) (OHS): 0 /100 WBC
PLATELET # BLD AUTO: 213 K/UL (ref 150–450)
PMV BLD AUTO: 9.3 FL (ref 9.2–12.9)
POTASSIUM SERPL-SCNC: 4.2 MMOL/L (ref 3.5–5.1)
PROT SERPL-MCNC: 6.7 GM/DL (ref 6–8.4)
RBC # BLD AUTO: 3.73 M/UL (ref 4–5.4)
RELATIVE EOSINOPHIL (OHS): 5.8 %
RELATIVE LYMPHOCYTE (OHS): 38.5 % (ref 18–48)
RELATIVE MONOCYTE (OHS): 6.1 % (ref 4–15)
RELATIVE NEUTROPHIL (OHS): 48.7 % (ref 38–73)
SODIUM SERPL-SCNC: 139 MMOL/L (ref 136–145)
T3FREE SERPL-MCNC: 2.3 PG/ML (ref 2.3–4.2)
T4 FREE SERPL-MCNC: 0.84 NG/DL (ref 0.71–1.51)
TRIGL SERPL-MCNC: 72 MG/DL (ref 30–150)
TSH SERPL-ACNC: 3.33 UIU/ML (ref 0.4–4)
WBC # BLD AUTO: 3.43 K/UL (ref 3.9–12.7)

## 2025-08-05 PROCEDURE — 36415 COLL VENOUS BLD VENIPUNCTURE: CPT | Mod: PO

## 2025-08-05 PROCEDURE — 82306 VITAMIN D 25 HYDROXY: CPT

## 2025-08-05 PROCEDURE — 84450 TRANSFERASE (AST) (SGOT): CPT

## 2025-08-05 PROCEDURE — 84481 FREE ASSAY (FT-3): CPT

## 2025-08-05 PROCEDURE — 80061 LIPID PANEL: CPT

## 2025-08-05 PROCEDURE — 84439 ASSAY OF FREE THYROXINE: CPT

## 2025-08-05 PROCEDURE — 84443 ASSAY THYROID STIM HORMONE: CPT

## 2025-08-05 PROCEDURE — 85025 COMPLETE CBC W/AUTO DIFF WBC: CPT

## 2025-08-07 ENCOUNTER — LAB VISIT (OUTPATIENT)
Dept: LAB | Facility: HOSPITAL | Age: 55
End: 2025-08-07
Attending: INTERNAL MEDICINE
Payer: MEDICARE

## 2025-08-07 DIAGNOSIS — D64.9 ANEMIA, UNSPECIFIED TYPE: ICD-10-CM

## 2025-08-07 LAB
FERRITIN SERPL-MCNC: 107 NG/ML (ref 20–300)
IRON SATN MFR SERPL: 45 % (ref 20–50)
IRON SERPL-MCNC: 159 UG/DL (ref 30–160)
TIBC SERPL-MCNC: 351 UG/DL (ref 250–450)
TRANSFERRIN SERPL-MCNC: 237 MG/DL (ref 200–375)

## 2025-08-07 PROCEDURE — 84466 ASSAY OF TRANSFERRIN: CPT

## 2025-08-07 PROCEDURE — 82728 ASSAY OF FERRITIN: CPT

## 2025-08-07 PROCEDURE — 36415 COLL VENOUS BLD VENIPUNCTURE: CPT | Mod: PO

## 2025-08-14 ENCOUNTER — TELEPHONE (OUTPATIENT)
Dept: HEMATOLOGY/ONCOLOGY | Facility: CLINIC | Age: 55
End: 2025-08-14
Payer: MEDICARE

## 2025-08-21 ENCOUNTER — TELEPHONE (OUTPATIENT)
Dept: HEMATOLOGY/ONCOLOGY | Facility: CLINIC | Age: 55
End: 2025-08-21
Payer: MEDICARE

## 2025-08-27 ENCOUNTER — TELEPHONE (OUTPATIENT)
Dept: HEMATOLOGY/ONCOLOGY | Facility: CLINIC | Age: 55
End: 2025-08-27
Payer: MEDICARE